# Patient Record
Sex: MALE | Race: BLACK OR AFRICAN AMERICAN | Employment: OTHER | URBAN - METROPOLITAN AREA
[De-identification: names, ages, dates, MRNs, and addresses within clinical notes are randomized per-mention and may not be internally consistent; named-entity substitution may affect disease eponyms.]

---

## 2017-01-10 DIAGNOSIS — J45.40 MODERATE PERSISTENT ASTHMA WITHOUT COMPLICATION: ICD-10-CM

## 2017-01-10 RX ORDER — FLUTICASONE PROPIONATE AND SALMETEROL 500; 50 UG/1; UG/1
1 POWDER RESPIRATORY (INHALATION) EVERY 12 HOURS
Qty: 3 EACH | Refills: 2 | Status: SHIPPED | OUTPATIENT
Start: 2017-01-10 | End: 2017-01-31 | Stop reason: SDUPTHER

## 2017-01-10 NOTE — TELEPHONE ENCOUNTER
Received faxed request from [employer] to have patient's medication filled for 90 day supply. Last seen on 11/15/16 with plan for patient to f/u with Dr. Amada Ramírez.

## 2017-01-10 NOTE — TELEPHONE ENCOUNTER
We'd discussed returning to Dr. Blade Villarreal no later than next spring, but I had wanted him to return to me in Dec to assess the change in his regimen. Please schedule that.   MARE

## 2017-01-11 NOTE — TELEPHONE ENCOUNTER
Called patient's home number no answer left him a message asking that he call the office back office number has been left.

## 2017-01-17 NOTE — TELEPHONE ENCOUNTER
Patient called the office back had him verify his  he has been told his medication has been sent to his pharmacy but Dr. Loletta Felty did want him to come back in December for a f/u on his Asthma. He has been scheduled to be seen on 2017 at 9:30 AM for asthma f/u.

## 2017-01-31 ENCOUNTER — OFFICE VISIT (OUTPATIENT)
Dept: FAMILY MEDICINE CLINIC | Age: 67
End: 2017-01-31

## 2017-01-31 VITALS
WEIGHT: 173 LBS | BODY MASS INDEX: 24.82 KG/M2 | OXYGEN SATURATION: 96 % | SYSTOLIC BLOOD PRESSURE: 128 MMHG | RESPIRATION RATE: 16 BRPM | HEART RATE: 71 BPM | DIASTOLIC BLOOD PRESSURE: 84 MMHG | TEMPERATURE: 98.1 F

## 2017-01-31 DIAGNOSIS — Z23 ENCOUNTER FOR IMMUNIZATION: ICD-10-CM

## 2017-01-31 DIAGNOSIS — J45.40 MODERATE PERSISTENT ASTHMA WITHOUT COMPLICATION: Primary | ICD-10-CM

## 2017-01-31 RX ORDER — FLUTICASONE PROPIONATE AND SALMETEROL 500; 50 UG/1; UG/1
1 POWDER RESPIRATORY (INHALATION) EVERY 12 HOURS
Qty: 3 EACH | Refills: 1 | Status: ON HOLD | OUTPATIENT
Start: 2017-01-31 | End: 2018-12-13

## 2017-01-31 RX ORDER — LEVALBUTEROL TARTRATE 45 UG/1
2 AEROSOL, METERED ORAL
Qty: 1 INHALER | Refills: 4 | Status: SHIPPED | OUTPATIENT
Start: 2017-01-31 | End: 2021-05-28 | Stop reason: SDUPTHER

## 2017-01-31 NOTE — PROGRESS NOTES
Chief Complaint   Patient presents with    Asthma     1. Have you been to the ER, urgent care clinic since your last visit? Hospitalized since your last visit? Pt was in [facility] ER in Dec. Was told he was dehydrated. 2. Have you seen or consulted any other health care providers outside of the [facility] since your last visit? Include any pap smears or colon screening.  No      PF: 450, 450, 400

## 2017-01-31 NOTE — PROGRESS NOTES
Graciela Arriaga is a 78 y.o. male  Follow-up moderate persistent asthma last visit 11/15/2016 uncontrolled. Discontinued Flovent. Initiated high-dose Advair. Continued Singulair and Xopenex. 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 Some GI side effects with Advair, tolerable. Asthma Control Test 12Yrs Older 1/31/2017 11/15/2016 7/29/2016 1/31/2014 11/1/2013   In the past 4 weeks, how much of the time did your asthma keep you from getting as much done at work, school, or at home? 5 3 3 4 4   During the past 4 weeks how often have you had shortness of breath 5 1 3 1 1   During the past 4 weeks often did your asthma symptoms wake up you at night or earlier than usual in the morning 5 3 2 2 4   During the past 4 weeks how often have you used your rescue inhaler or nebulizer medication  4 2 1 2 1   How would you rate your asthma control during the past 4 weeks 4 3 3 3 3   Score 23 12 12 12 13         Patient Care Team:  Yuly Arellano MD as PCP - General (Family Practice)  Yuly Arellano MD (Family Practice)  Peg Love MD (Inactive) (Colon and Rectal Surgery)  Jayde Bunch MD (Pulmonary Disease)  Brian Negro MD (Allergy)  Italia Ha MD (Gastroenterology)  Bniu Harris MD (Ophthalmology)  Allergies   Allergen Reactions    Latex Hives and Itching    Other Medication Other (comments)     Pt allergic to cats with respiratory, skin reactions. Pt allergic to cashews with upset stomach    Vicodin [Hydrocodone-Acetaminophen] Other (comments)     hallucinations     Outpatient Prescriptions Marked as Taking for the 1/31/17 encounter (Office Visit) with Yuly Arellano MD   Medication Sig Dispense Refill    fluticasone-salmeterol (ADVAIR) 500-50 mcg/dose diskus inhaler Take 1 Puff by inhalation every twelve (12) hours.  3 Each 2    levalbuterol tartrate (XOPENEX) 45 mcg/actuation inhaler Take 2 Puffs by inhalation every four (4) hours as needed for Wheezing. 1 Inhaler 4    montelukast (SINGULAIR) 10 mg tablet Take 1 Tab by mouth daily. 90 Tab 4    cetirizine-psuedoePHEDrine (ZYRTEC-D) 5-120 mg per tablet Take 1 Tab by mouth daily. 90 Tab 4    levalbuterol (XOPENEX) 1.25 mg/3 mL nebu 3 mL by Nebulization route every six (6) hours as needed. 1 Package 2    metoclopramide HCl (REGLAN) 10 mg tablet Take 10 mg by mouth Before breakfast, lunch, dinner and at bedtime.  aspirin 81 mg chewable tablet Take 1 Tab by mouth daily. 90 Tab 4    amLODIPine (NORVASC) 5 mg tablet Take 1 Tab by mouth daily. 60 Tab 1    albuterol (PROVENTIL VENTOLIN) 2.5 mg /3 mL (0.083 %) nebulizer solution 3 mL by Nebulization route every four (4) hours as needed for Wheezing. 1 Package 1    FA/MV-MN/MIN AA JANETT/SAW PAL (SAW PALMETO-MULTIVIT-MIN-AA-FA PO) Take 1 Tab by mouth daily.  vitamin e (E GEMS) 1,000 unit capsule Take 1,000 Units by mouth daily.  Cholecalciferol, Vitamin D3, (VITAMIN D) 2,000 unit Cap Take 1 Cap by mouth daily. 30 Cap 11     Patient Active Problem List    Diagnosis    Hyperlipidemia     3/6/2016: ASCVD 10 year risk 14.9 %. Lifetime risk NA . Statin initiated.  Diastolic dysfunction     5/23/0451: inpatient echo (chest pain) EF 75-88% diastolic dysfunction I. Dr. Virginia Louise. CCB initiated 3/6/2016.  Essential hypertension     3/6/2016: ASCVD 10 year risk 14.9 %. Lifetime risk NA. Aspirin initiated. Switched from thiazide to CCB for context of diastolic dysfunction.       Elevated blood pressure    Elevated PSA    Hypertrophy of prostate with urinary obstruction and other lower urinary tract symptoms (LUTS)    Family history of prostate cancer    Lumbosacral radiculopathy due to degenerative joint disease of spine    Cervical radiculopathy due to degenerative joint disease of spine    Allergic rhinitis    Moderate persistent asthma without complication     5/9/1503: PFT consistent with Obstructive airway disease mild in nature. Overall looks like COPD and emphysema but as non smoker  possibility of Asthma with airway remodeling is there.  Neutropenia (HCC)    Cough    Vitamin D deficiency     Past Medical History   Diagnosis Date    Asthma     Back problem     Burning with urination     Cough     Depression     GERD (gastroesophageal reflux disease)     Poor appetite     Trouble in sleeping     Wheezing      Past Surgical History   Procedure Laterality Date    Hx orchiectomy  1999     left, varicocele with complications     Family History   Problem Relation Age of Onset    Hypertension Mother     Heart Disease Mother     Arthritis-rheumatoid Mother     Hypertension Father     Heart Attack Father 48    Cancer Brother 72     prostate    Cancer Brother 61     prostate    Colon Cancer Brother     Heart Attack Brother 48    Cancer Brother 66     prostate    Diabetes Brother     Heart Attack Sister 61    Colon Polyps Sister     Heart Attack Brother 54    Heart Attack Sister 72     Social History     Social History    Marital status:      Spouse name: N/A    Number of children: N/A    Years of education: N/A     Occupational History    Not on file. Social History Main Topics    Smoking status: Never Smoker    Smokeless tobacco: Never Used    Alcohol use 3.0 oz/week     6 Cans of beer per week      Comment: occ    Drug use: No    Sexual activity: Yes     Partners: Female     Other Topics Concern    Not on file     Social History Narrative         ROS         Visit Vitals    /84 (BP 1 Location: Left arm, BP Patient Position: Sitting)    Pulse 71    Temp 98.1 °F (36.7 °C) (Temporal)    Resp 16    Wt 173 lb (78.5 kg)    SpO2 96%     L/min    BMI 24.82 kg/m2   expected 530  Physical Exam   Constitutional: He is oriented to person, place, and time. He appears well-developed. No distress. HENT:   Head: Normocephalic and atraumatic.    Pulmonary/Chest: Effort normal. Neurological: He is alert and oriented to person, place, and time. Psychiatric: He has a normal mood and affect. His behavior is normal. Judgment and thought content normal.         ICD-10-CM ICD-9-CM    1. Moderate persistent asthma without complication C03.04 156.98 fluticasone-salmeterol (ADVAIR) 500-50 mcg/dose diskus inhaler      levalbuterol tartrate (XOPENEX) 45 mcg/actuation inhaler   2. Encounter for immunization Z23 V03.89 varicella zoster vacine live (ZOSTAVAX) 19,400 unit/0.65 mL susr injection     Moderate persistent asthma: Improved. Clinically well-controlled but peak flows are less than I would expect for age and height. He has not yet been contacted to schedule his pulmonary subspecialty appointment. I will ask my support staff to facilitate. The patient understands our medical plan. All questions have been answered. He is instructed to call the clinic if he has not been notified either by phone or through [address] with the results of his tests or with an appointment plan for any referrals within 1 week(s). No news is not good news; it's no news. The patient  is to call if his condition worsens or fails to improve or if significant side effects are experienced. Follow-up Disposition:  Return in about 3 months (around 4/30/2017) for asthma with pulmonologist and then plan to follow . Dragon medical dictation software was used for portions of this report. Unintended voice recognition errors may occur.

## 2017-01-31 NOTE — MR AVS SNAPSHOT
Visit Information Date & Time Provider Department Dept. Phone Encounter #  
 1/31/2017  2:15 PM Estela Herrera MD [facility] [phone] [id number] Follow-up Instructions Return in about 3 months (around 4/30/2017) for asthma with pulmonologist and then plan to follow . Upcoming Health Maintenance Date Due ZOSTER VACCINE AGE 60> 8/2/2009 GLAUCOMA SCREENING Q2Y 12/1/2015 MEDICARE YEARLY EXAM 2/9/2017 Pneumococcal 65+ Low/Medium Risk (2 of 2 - PPSV23) 5/8/2018 COLONOSCOPY 6/18/2020 DTaP/Tdap/Td series (2 - Td) 9/9/2020 Allergies as of 1/31/2017  Review Complete On: 1/31/2017 By: Estela Hererra MD  
  
 Severity Noted Reaction Type Reactions Latex  04/08/2015    Hives, Itching Other Medication  01/23/2013    Other (comments) Pt allergic to cats with respiratory, skin reactions. Pt allergic to cashews with upset stomach Vicodin [Hydrocodone-acetaminophen]  05/15/2014    Other (comments)  
 hallucinations Current Immunizations  Never Reviewed Name Date Influenza Vaccine 11/1/2013 Pneumococcal Conjugate (PCV-13) 2/9/2016 Pneumococcal Polysaccharide (PPSV-23) 5/8/2013 TDAP Vaccine 9/9/2010 Not reviewed this visit You Were Diagnosed With   
  
 Codes Comments Moderate persistent asthma without complication    -  Primary ICD-10-CM: J45.40 ICD-9-CM: 493.90 Encounter for immunization     ICD-10-CM: O70 ICD-9-CM: V03.89 Vitals BP Pulse Temp Resp Weight(growth percentile) SpO2  
 128/84 (BP 1 Location: Left arm, BP Patient Position: Sitting) 71 98.1 °F (36.7 °C) (Temporal) 16 173 lb (78.5 kg) 96% PF BMI Smoking Status 450 L/min 24.82 kg/m2 Never Smoker Vitals History BMI and BSA Data Body Mass Index Body Surface Area  
 24.82 kg/m 2 1.97 m 2 [facility] [address] [phone] Your Updated Medication List  
  
   
This list is accurate as of: 1/31/17  2:58 PM.  Always use your most recent med list.  
  
  
  
  
 albuterol 2.5 mg /3 mL (0.083 %) nebulizer solution Commonly known as:  PROVENTIL VENTOLIN  
3 mL by Nebulization route every four (4) hours as needed for Wheezing. amLODIPine 5 mg tablet Commonly known as:  Pema Spruce Take 1 Tab by mouth daily. aspirin 81 mg chewable tablet Take 1 Tab by mouth daily. atorvastatin 20 mg tablet Commonly known as:  LIPITOR Take 1 Tab by mouth daily. cetirizine-psuedoePHEDrine 5-120 mg per tablet Commonly known as:  ZyrTEC-D Take 1 Tab by mouth daily. Cholecalciferol (Vitamin D3) 2,000 unit Cap capsule Commonly known as:  VITAMIN D Take 1 Cap by mouth daily. fluticasone-salmeterol 500-50 mcg/dose diskus inhaler Commonly known as:  ADVAIR Take 1 Puff by inhalation every twelve (12) hours. levalbuterol 1.25 mg/3 mL Nebu Commonly known as:  XOPENEX  
3 mL by Nebulization route every six (6) hours as needed. levalbuterol tartrate 45 mcg/actuation inhaler Commonly known as:  Carolyn Fernando Take 2 Puffs by inhalation every four (4) hours as needed for Wheezing. metoclopramide HCl 10 mg tablet Commonly known as:  REGLAN Take 10 mg by mouth Before breakfast, lunch, dinner and at bedtime. montelukast 10 mg tablet Commonly known as:  SINGULAIR Take 1 Tab by mouth daily. SAW PALMETO-MULTIVIT-MIN-AA-FA PO Take 1 Tab by mouth daily. varicella zoster vacine live 19,400 unit/0.65 mL Susr injection Commonly known as:  ZOSTAVAX  
1 Vial by SubCUTAneous route once for 1 dose. vitamin e 1,000 unit capsule Commonly known as:  E GEMS Take 1,000 Units by mouth daily. Prescriptions Printed Refills varicella zoster vacine live (ZOSTAVAX) 19,400 unit/0.65 mL susr injection 0 Si Vial by SubCUTAneous route once for 1 dose. Class: Print Route: SubCUTAneous Prescriptions Sent to Pharmacy Refills  
 fluticasone-salmeterol (ADVAIR) 500-50 mcg/dose diskus inhaler 1 Sig: Take 1 Puff by inhalation every twelve (12) hours. Class: Normal  
 Pharmacy: [address], [address] [place] [address] #: [phone] Route: Inhalation  
 levalbuterol tartrate (XOPENEX) 45 mcg/actuation inhaler 4 Sig: Take 2 Puffs by inhalation every four (4) hours as needed for Wheezing. Class: Normal  
 Pharmacy: [address], [address] Ph #: [phone] Route: Inhalation Follow-up Instructions Return in about 3 months (around 2017) for asthma with pulmonologist and then plan to follow . Introducing [facility]! Dear Cristian Mckinley: Thank you for requesting a [employer] account. Our records indicate that you already have an active [employer] account. You can access your account anytime at [email or web address] Did you know that you can access your hospital and ER discharge instructions at any time in [employer]? You can also review all of your test results from your hospital stay or ER visit. Additional Information If you have questions, please visit the Frequently Asked Questions section of the [employer] website at [email or web address] Remember, [employer] is NOT to be used for urgent needs. For medical emergencies, dial 911. Now available from your iPhone and Android! Please provide this summary of care documentation to your next provider. Your primary care clinician is listed as Amarjit Joseph. If you have any questions after today's visit, please call [phone].

## 2017-04-04 ENCOUNTER — HOSPITAL ENCOUNTER (OUTPATIENT)
Dept: LAB | Age: 67
Discharge: HOME | End: 2017-04-04
Payer: MEDICARE

## 2017-04-04 ENCOUNTER — OFFICE VISIT (OUTPATIENT)
Dept: FAMILY MEDICINE CLINIC | Age: 67
End: 2017-04-04

## 2017-04-04 VITALS
OXYGEN SATURATION: 96 % | TEMPERATURE: 97.9 F | BODY MASS INDEX: 24.82 KG/M2 | HEART RATE: 83 BPM | WEIGHT: 173 LBS | DIASTOLIC BLOOD PRESSURE: 80 MMHG | SYSTOLIC BLOOD PRESSURE: 124 MMHG | RESPIRATION RATE: 16 BRPM

## 2017-04-04 DIAGNOSIS — Z00.00 ROUTINE GENERAL MEDICAL EXAMINATION AT A HEALTH CARE FACILITY: Primary | ICD-10-CM

## 2017-04-04 DIAGNOSIS — Z12.5 SPECIAL SCREENING FOR MALIGNANT NEOPLASM OF PROSTATE: ICD-10-CM

## 2017-04-04 DIAGNOSIS — Z13.31 SCREENING FOR DEPRESSION: ICD-10-CM

## 2017-04-04 DIAGNOSIS — J45.40 MODERATE PERSISTENT ASTHMA WITHOUT COMPLICATION: ICD-10-CM

## 2017-04-04 DIAGNOSIS — Z80.42 FAMILY HISTORY OF PROSTATE CANCER: ICD-10-CM

## 2017-04-04 DIAGNOSIS — Z13.39 SCREENING FOR ALCOHOLISM: ICD-10-CM

## 2017-04-04 LAB — PSA SERPL-MCNC: 1.9 NG/ML (ref 0–4)

## 2017-04-04 PROCEDURE — 84153 ASSAY OF PSA TOTAL: CPT | Performed by: FAMILY MEDICINE

## 2017-04-04 NOTE — MR AVS SNAPSHOT
Visit Information Date & Time Provider Department Dept. Phone Encounter #  
 4/4/2017 10:00 AM Saul Denton MD [facility] [phone] [id number] Upcoming Health Maintenance Date Due ZOSTER VACCINE AGE 60> 8/2/2009 GLAUCOMA SCREENING Q2Y 12/1/2015 MEDICARE YEARLY EXAM 2/9/2017 Pneumococcal 65+ Low/Medium Risk (2 of 2 - PPSV23) 5/8/2018 COLONOSCOPY 6/18/2020 DTaP/Tdap/Td series (2 - Td) 9/9/2020 Allergies as of 4/4/2017  Review Complete On: 4/4/2017 By: Saul Denton MD  
  
 Severity Noted Reaction Type Reactions Latex  04/08/2015    Hives, Itching Other Medication  01/23/2013    Other (comments) Pt allergic to cats with respiratory, skin reactions. Pt allergic to cashews with upset stomach Vicodin [Hydrocodone-acetaminophen]  05/15/2014    Other (comments)  
 hallucinations Current Immunizations  Never Reviewed Name Date Influenza Vaccine 11/1/2013 Pneumococcal Conjugate (PCV-13) 2/9/2016 Pneumococcal Polysaccharide (PPSV-23) 5/8/2013 TDAP Vaccine 9/9/2010 Not reviewed this visit You Were Diagnosed With   
  
 Codes Comments Special screening for malignant neoplasm of prostate    -  Primary ICD-10-CM: Z12.5 ICD-9-CM: V76.44 Routine general medical examination at a health care facility     ICD-10-CM: Z00.00 ICD-9-CM: V70.0 Screening for alcoholism     ICD-10-CM: Z13.89 ICD-9-CM: V79.1 Vitals BP Pulse Temp Resp Weight(growth percentile) SpO2  
 124/80 83 97.9 °F (36.6 °C) (Temporal) 16 173 lb (78.5 kg) 96% BMI Smoking Status 24.82 kg/m2 Never Smoker BMI and BSA Data Body Mass Index Body Surface Area  
 24.82 kg/m 2 1.97 m 2 Preferred Pharmacy Pharmacy Name Phone [address] [facility] [phone] Your Updated Medication List  
  
   
 This list is accurate as of: 4/4/17 10:34 AM.  Always use your most recent med list.  
  
  
  
  
 albuterol 2.5 mg /3 mL (0.083 %) nebulizer solution Commonly known as:  PROVENTIL VENTOLIN  
3 mL by Nebulization route every four (4) hours as needed for Wheezing. amLODIPine 5 mg tablet Commonly known as:  Brittani Fast Take 1 Tab by mouth daily. aspirin 81 mg chewable tablet Take 1 Tab by mouth daily. atorvastatin 20 mg tablet Commonly known as:  LIPITOR Take 1 Tab by mouth daily. cetirizine-psuedoePHEDrine 5-120 mg per tablet Commonly known as:  ZyrTEC-D Take 1 Tab by mouth daily. Cholecalciferol (Vitamin D3) 2,000 unit Cap capsule Commonly known as:  VITAMIN D Take 1 Cap by mouth daily. fluticasone-salmeterol 500-50 mcg/dose diskus inhaler Commonly known as:  ADVAIR Take 1 Puff by inhalation every twelve (12) hours. levalbuterol 1.25 mg/3 mL Nebu Commonly known as:  XOPENEX  
3 mL by Nebulization route every six (6) hours as needed. levalbuterol tartrate 45 mcg/actuation inhaler Commonly known as:  Angela Rausch Take 2 Puffs by inhalation every four (4) hours as needed for Wheezing. metoclopramide HCl 10 mg tablet Commonly known as:  REGLAN Take 10 mg by mouth Before breakfast, lunch, dinner and at bedtime. montelukast 10 mg tablet Commonly known as:  SINGULAIR Take 1 Tab by mouth daily. SAW PALMETO-MULTIVIT-MIN-AA-FA PO Take 1 Tab by mouth daily. vitamin e 1,000 unit capsule Commonly known as:  E GEMS Take 1,000 Units by mouth daily. To-Do List   
 04/04/2017 Lab:  PSA SCREENING (SCREENING) Patient Instructions Medicare Wellness Visit, Male The best way to live healthy is to have a healthy lifestyle by eating a well-balanced diet, exercising regularly, limiting alcohol and stopping smoking. Regular physical exams and screening tests are another way to keep healthy. Preventive exams provided by your health care provider can find health problems before they become diseases or illnesses. Preventive services including immunizations, screening tests, monitoring and exams can help you take care of your own health. All people over age 72 should have a pneumovax  and and a prevnar shot to prevent pneumonia. These are once in a lifetime unless you and your provider decide differently. All people over 65 should have a yearly flu shot and a tetanus vaccine every 10 years. Immunization History Administered Date(s) Administered  Influenza Vaccine 11/01/2013  Pneumococcal Conjugate (PCV-13) 02/09/2016  Pneumococcal Polysaccharide (PPSV-23) 05/08/2013  TDAP Vaccine 09/09/2010 You will be due for another dose of PPSV-23 on or after 5/8/2018. Screening for diabetes mellitus with a blood sugar test should be done every 3 years though age 79, then at our discretion. Lab Results Component Value Date/Time Glucose 95 02/29/2016 04:43 AM  
 
 
Glaucoma is a disease of the eye due to increased ocular pressure that can lead to blindness and it should be done every year by an eye professional. Please schedule if not already done. Cardiovascular screening tests that check for elevated lipids (fatty part of blood) which can lead to heart disease and strokes should be done every 5 years. Lab Results Component Value Date/Time Cholesterol, total 240 02/29/2016 04:43 AM  
 HDL Cholesterol 81 02/29/2016 04:43 AM  
 LDL, calculated 141.4 02/29/2016 04:43 AM  
 VLDL, calculated 17.6 02/29/2016 04:43 AM  
 Triglyceride 88 02/29/2016 04:43 AM  
 CHOL/HDL Ratio 3.0 02/29/2016 04:43 AM  
 
Colorectal screening that evaluates for blood or polyps in your colon should be done yearly as a stool test or every five years as a flexible sigmoidoscope or every 10 years as a colonoscopy up to age 76.  
 
Men up to age 76 may need a screening blood test for prostate cancer at certain intervals, depending on their personal and family history. This decision is between the patient and his provider. Lab Results Component Value Date/Time  
 Prostate Specific Ag 2.32 02/04/2015 02:25 PM  
 Prostate Specific Ag 3.1 01/08/2015 02:08 PM  
 Prostate Specific Ag 1.8 10/18/2012 01:30 PM  
 Prostate Specific Ag 3.5 04/21/2010 08:36 PM  
 Prostate Specific Ag 0.9 10/26/2009 09:30 AM  
 
 
 
If you have been a smoker or had family history of abdominal aortic aneurysms, you and your provider may decide to schedule an ultrasound test of your aorta. Hepatitis C screening is also recommended for anyone born between [birth year] through [address]. A shingles vaccine is also recommended once in a lifetime after age 61. Your Medicare Wellness Exam is recommended annually. Here is a list of your current Health Maintenance items with a due date: 
Health Maintenance Due Topic Date Due  Shingles Vaccine  08/02/2009  Glaucoma Screening   12/01/2015 [address] [place] [address] Annual Well Visit  02/09/2017 Pt had Zoster at [facility]. Health Maintenance Topic Date Due  
 ZOSTER VACCINE AGE 60>  08/02/2009  GLAUCOMA SCREENING Q2Y  12/01/2015  MEDICARE YEARLY EXAM  02/09/2017  Pneumococcal 65+ Low/Medium Risk (2 of 2 - PPSV23) 05/08/2018  COLONOSCOPY  06/18/2020  
 DTaP/Tdap/Td series (2 - Td) 09/09/2020  Hepatitis C Screening  Completed  INFLUENZA AGE 9 TO ADULT  Addressed Well Visit, Over 72: Care Instructions Your Care Instructions Physical exams can help you stay healthy. Your doctor has checked your overall health and may have suggested ways to take good care of yourself. He or she also may have recommended tests. At home, you can help prevent illness with healthy eating, regular exercise, and other steps. Follow-up care is a key part of your treatment and safety.  Be sure to make and go to all appointments, and call your doctor if you are having problems. It's also a good idea to know your test results and keep a list of the medicines you take. How can you care for yourself at home? · Reach and stay at a healthy weight. This will lower your risk for many problems, such as obesity, diabetes, heart disease, and high blood pressure. · Get at least 30 minutes of exercise on most days of the week. Walking is a good choice. You also may want to do other activities, such as running, swimming, cycling, or playing tennis or team sports. · Do not smoke. Smoking can make health problems worse. If you need help quitting, talk to your doctor about stop-smoking programs and medicines. These can increase your chances of quitting for good. · Protect your skin from too much sun. When you're outdoors from 10 a.m. to 4 p.m., stay in the shade or cover up with clothing and a hat with a wide brim. Wear sunglasses that block UV rays. Even when it's cloudy, put broad-spectrum sunscreen (SPF 30 or higher) on any exposed skin. · See a dentist one or two times a year for checkups and to have your teeth cleaned. · Wear a seat belt in the car. · Limit alcohol to 2 drinks a day for men and 1 drink a day for women. Too much alcohol can cause health problems. Follow your doctor's advice about when to have certain tests. These tests can spot problems early. For men and women · Cholesterol. Your doctor will tell you how often to have this done based on your overall health and other things that can increase your risk for heart attack and stroke. · Blood pressure. Have your blood pressure checked during a routine doctor visit. Your doctor will tell you how often to check your blood pressure based on your age, your blood pressure results, and other factors. · Diabetes. Ask your doctor whether you should have tests for diabetes. · Vision. Experts recommend that you have yearly exams for glaucoma and other age-related eye problems. · Hearing. Tell your doctor if you notice any change in your hearing. You can have tests to find out how well you hear. · Colon cancer tests. Keep having colon cancer tests as your doctor recommends. You can have one of several types of tests. · Heart attack and stroke risk. At least every 4 to 6 years, you should have your risk for heart attack and stroke assessed. Your doctor uses factors such as your age, blood pressure, cholesterol, and whether you smoke or have diabetes to show what your risk for a heart attack or stroke is over the next 10 years. · Osteoporosis. Talk to your doctor about whether you should have a bone density test to find out whether you have thinning bones. Also ask your doctor about whether you should take calcium and vitamin D supplements. For women · Pap test and pelvic exam. You may no longer need a Pap test. Talk with your doctor about whether to stop or continue to have Pap tests. · Breast exam and mammogram. Ask how often you should have a mammogram, which is an X-ray of your breasts. A mammogram can spot breast cancer before it can be felt and when it is easiest to treat. · Thyroid disease. Talk to your doctor about whether to have your thyroid checked as part of a regular physical exam. Women have an increased chance of a thyroid problem. For men · Prostate exam. Talk to your doctor about whether you should have a blood test (called a PSA test) for prostate cancer. Experts disagree on whether men should have this test. Some experts recommend that you discuss the benefits and risks of the test with your doctor. · Abdominal aortic aneurysm. Ask your doctor whether you should have a test to check for an aneurysm. You may need a test if you ever smoked or if your parent, brother, sister, or child has had an aneurysm. When should you call for help?  
Watch closely for changes in your health, and be sure to contact your doctor if you have any problems or symptoms that concern you. Where can you learn more? Go to http://fredis-haja.info/. Enter P164 in the search box to learn more about \"Well Visit, Over 65: Care Instructions. \" Current as of: July 19, 2016 Content Version: 11.2 © 3834-1480 [employer]. Care instructions adapted under license by [employer] (which disclaims liability or warranty for this information). If you have questions about a medical condition or this instruction, always ask your healthcare professional. [address] any warranty or liability for your use of this information. Introducing [facility]! Dear Yee Burns: Thank you for requesting a MyDoc account. Our records indicate that you already have an active MyDoc account. You can access your account anytime at [email or web address] Did you know that you can access your hospital and ER discharge instructions at any time in MyDoc? You can also review all of your test results from your hospital stay or ER visit. Additional Information If you have questions, please visit the Frequently Asked Questions section of the MyDoc website at [email or web address] Remember, MyDoc is NOT to be used for urgent needs. For medical emergencies, dial 911. Now available from your iPhone and Android! Please provide this summary of care documentation to your next provider. Your primary care clinician is listed as [place]. If you have any questions after today's visit, please call [phone].

## 2017-04-04 NOTE — PATIENT INSTRUCTIONS
Encouraged to provide copy of ACP documents. Medicare Wellness Visit, Male    The best way to live healthy is to have a healthy lifestyle by eating a well-balanced diet, exercising regularly, limiting alcohol and stopping smoking. Regular physical exams and screening tests are another way to keep healthy. Preventive exams provided by your health care provider can find health problems before they become diseases or illnesses. Preventive services including immunizations, screening tests, monitoring and exams can help you take care of your own health. All people over age 72 should have a pneumovax  and and a prevnar shot to prevent pneumonia. These are once in a lifetime unless you and your provider decide differently. All people over 65 should have a yearly flu shot and a tetanus vaccine every 10 years. Immunization History   Administered Date(s) Administered    Influenza Vaccine 11/01/2013    Pneumococcal Conjugate (PCV-13) 02/09/2016    Pneumococcal Polysaccharide (PPSV-23) 05/08/2013    TDAP Vaccine 09/09/2010     You will be due for another dose of PPSV-23 on or after 5/8/2018. Screening for diabetes mellitus with a blood sugar test should be done every 3 years though age 79, then at our discretion. Lab Results   Component Value Date/Time    Glucose 95 02/29/2016 04:43 AM       Glaucoma is a disease of the eye due to increased ocular pressure that can lead to blindness and it should be done every year by an eye professional. Please schedule if not already done. Cardiovascular screening tests that check for elevated lipids (fatty part of blood) which can lead to heart disease and strokes should be done every 5 years.   Lab Results   Component Value Date/Time    Cholesterol, total 240 02/29/2016 04:43 AM    HDL Cholesterol 81 02/29/2016 04:43 AM    LDL, calculated 141.4 02/29/2016 04:43 AM    VLDL, calculated 17.6 02/29/2016 04:43 AM    Triglyceride 88 02/29/2016 04:43 AM    CHOL/HDL Ratio 3.0 02/29/2016 04:43 AM     Colorectal screening that evaluates for blood or polyps in your colon should be done yearly as a stool test or every five years as a flexible sigmoidoscope or every 10 years as a colonoscopy up to age 76. Men up to age 76 may need a screening blood test for prostate cancer at certain intervals, depending on their personal and family history. This decision is between the patient and his provider. Lab Results   Component Value Date/Time    Prostate Specific Ag 2.32 02/04/2015 02:25 PM    Prostate Specific Ag 3.1 01/08/2015 02:08 PM    Prostate Specific Ag 1.8 10/18/2012 01:30 PM    Prostate Specific Ag 3.5 04/21/2010 08:36 PM    Prostate Specific Ag 0.9 10/26/2009 09:30 AM         If you have been a smoker or had family history of abdominal aortic aneurysms, you and your provider may decide to schedule an ultrasound test of your aorta. Hepatitis C screening is also recommended for anyone born between [birth year] through [address]. A shingles vaccine is also recommended once in a lifetime after age 61. Your Medicare Wellness Exam is recommended annually. Here is a list of your current Health Maintenance items with a due date:  Health Maintenance Due   Topic Date Due    Shingles Vaccine  08/02/2009    Glaucoma Screening   12/01/2015    Annual Well Visit  02/09/2017     Pt had Zoster at [facility]. Health Maintenance   Topic Date Due    ZOSTER VACCINE AGE 60>  08/02/2009    GLAUCOMA SCREENING Q2Y  12/01/2015    MEDICARE YEARLY EXAM  02/09/2017    Pneumococcal 65+ Low/Medium Risk (2 of 2 - PPSV23) 05/08/2018    COLONOSCOPY  06/18/2020    DTaP/Tdap/Td series (2 - Td) 09/09/2020    Hepatitis C Screening  Completed    INFLUENZA AGE 9 TO ADULT  Addressed            Well Visit, Over 72: Care Instructions  Your Care Instructions  Physical exams can help you stay healthy. Your doctor has checked your overall health and may have suggested ways to take good care of yourself.  He or she also may have recommended tests. At home, you can help prevent illness with healthy eating, regular exercise, and other steps. Follow-up care is a key part of your treatment and safety. Be sure to make and go to all appointments, and call your doctor if you are having problems. It's also a good idea to know your test results and keep a list of the medicines you take. How can you care for yourself at home? · Reach and stay at a healthy weight. This will lower your risk for many problems, such as obesity, diabetes, heart disease, and high blood pressure. · Get at least 30 minutes of exercise on most days of the week. Walking is a good choice. You also may want to do other activities, such as running, swimming, cycling, or playing tennis or team sports. · Do not smoke. Smoking can make health problems worse. If you need help quitting, talk to your doctor about stop-smoking programs and medicines. These can increase your chances of quitting for good. · Protect your skin from too much sun. When you're outdoors from 10 a.m. to 4 p.m., stay in the shade or cover up with clothing and a hat with a wide brim. Wear sunglasses that block UV rays. Even when it's cloudy, put broad-spectrum sunscreen (SPF 30 or higher) on any exposed skin. · See a dentist one or two times a year for checkups and to have your teeth cleaned. · Wear a seat belt in the car. · Limit alcohol to 2 drinks a day for men and 1 drink a day for women. Too much alcohol can cause health problems. Follow your doctor's advice about when to have certain tests. These tests can spot problems early. For men and women  · Cholesterol. Your doctor will tell you how often to have this done based on your overall health and other things that can increase your risk for heart attack and stroke. · Blood pressure. Have your blood pressure checked during a routine doctor visit.  Your doctor will tell you how often to check your blood pressure based on your age, your blood pressure results, and other factors. · Diabetes. Ask your doctor whether you should have tests for diabetes. · Vision. Experts recommend that you have yearly exams for glaucoma and other age-related eye problems. · Hearing. Tell your doctor if you notice any change in your hearing. You can have tests to find out how well you hear. · Colon cancer tests. Keep having colon cancer tests as your doctor recommends. You can have one of several types of tests. · Heart attack and stroke risk. At least every 4 to 6 years, you should have your risk for heart attack and stroke assessed. Your doctor uses factors such as your age, blood pressure, cholesterol, and whether you smoke or have diabetes to show what your risk for a heart attack or stroke is over the next 10 years. · Osteoporosis. Talk to your doctor about whether you should have a bone density test to find out whether you have thinning bones. Also ask your doctor about whether you should take calcium and vitamin D supplements. For women  · Pap test and pelvic exam. You may no longer need a Pap test. Talk with your doctor about whether to stop or continue to have Pap tests. · Breast exam and mammogram. Ask how often you should have a mammogram, which is an X-ray of your breasts. A mammogram can spot breast cancer before it can be felt and when it is easiest to treat. · Thyroid disease. Talk to your doctor about whether to have your thyroid checked as part of a regular physical exam. Women have an increased chance of a thyroid problem. For men  · Prostate exam. Talk to your doctor about whether you should have a blood test (called a PSA test) for prostate cancer. Experts disagree on whether men should have this test. Some experts recommend that you discuss the benefits and risks of the test with your doctor. · Abdominal aortic aneurysm. Ask your doctor whether you should have a test to check for an aneurysm.  You may need a test if you ever smoked or if your parent, brother, sister, or child has had an aneurysm. When should you call for help? Watch closely for changes in your health, and be sure to contact your doctor if you have any problems or symptoms that concern you. Where can you learn more? Go to http://fredis-haja.info/. Enter C581 in the search box to learn more about \"Well Visit, Over 65: Care Instructions. \"  Current as of: July 19, 2016  Content Version: 11.2  © 5997-7672 [employer]. Care instructions adapted under license by [employer] (which disclaims liability or warranty for this information). If you have questions about a medical condition or this instruction, always ask your healthcare professional. [address] any warranty or liability for your use of this information.

## 2017-04-04 NOTE — PROGRESS NOTES
Chief Complaint   Patient presents with    Annual Wellness Visit    Asthma     patient says it's flared today d/t pollen. Wants to proceed with [address] [place] SOB. This is a Subsequent Medicare Annual Wellness Visit providing Personalized Prevention Plan Services (PPPS) (Performed 12 months after initial AWV and PPPS )    I have reviewed the patient's medical history in detail and updated the computerized patient record. History     Past Medical History:   Diagnosis Date    Asthma     Back problem     Burning with urination     Cough     Depression     GERD (gastroesophageal reflux disease)     Poor appetite     Trouble in sleeping     Wheezing       Past Surgical History:   Procedure Laterality Date    HX ORCHIECTOMY  1999    left, varicocele with complications     Current Outpatient Prescriptions   Medication Sig Dispense Refill    fluticasone-salmeterol (ADVAIR) 500-50 mcg/dose diskus inhaler Take 1 Puff by inhalation every twelve (12) hours. 3 Each 1    levalbuterol tartrate (XOPENEX) 45 mcg/actuation inhaler Take 2 Puffs by inhalation every four (4) hours as needed for Wheezing. 1 Inhaler 4    montelukast (SINGULAIR) 10 mg tablet Take 1 Tab by mouth daily. 90 Tab 4    cetirizine-psuedoePHEDrine (ZYRTEC-D) 5-120 mg per tablet Take 1 Tab by mouth daily. 90 Tab 4    levalbuterol (XOPENEX) 1.25 mg/3 mL nebu 3 mL by Nebulization route every six (6) hours as needed. 1 Package 2    metoclopramide HCl (REGLAN) 10 mg tablet Take 10 mg by mouth Before breakfast, lunch, dinner and at bedtime.  aspirin 81 mg chewable tablet Take 1 Tab by mouth daily. 90 Tab 4    amLODIPine (NORVASC) 5 mg tablet Take 1 Tab by mouth daily. 60 Tab 1    albuterol (PROVENTIL VENTOLIN) 2.5 mg /3 mL (0.083 %) nebulizer solution 3 mL by Nebulization route every four (4) hours as needed for Wheezing. 1 Package 1    FA/MV-MN/MIN AA JANETT/SAW PAL (SAW PALMETO-MULTIVIT-MIN-AA-FA PO) Take 1 Tab by mouth daily.       vitamin e (E GEMS) 1,000 unit capsule Take 1,000 Units by mouth daily.  Cholecalciferol, Vitamin D3, (VITAMIN D) 2,000 unit Cap Take 1 Cap by mouth daily. 30 Cap 11    atorvastatin (LIPITOR) 20 mg tablet Take 1 Tab by mouth daily. 90 Tab 4     Allergies   Allergen Reactions    Latex Hives and Itching    Other Medication Other (comments)     Pt allergic to cats with respiratory, skin reactions.   Pt allergic to cashews with upset stomach    Vicodin [Hydrocodone-Acetaminophen] Other (comments)     hallucinations     Family History   Problem Relation Age of Onset    Hypertension Mother     Heart Disease Mother    [address] [facility] Arthritis-rheumatoid Mother     Hypertension Father     Heart Attack Father 48    Cancer Brother 72     prostate    Cancer Brother 61     prostate    Colon Cancer Brother     Heart Attack Brother 48    Cancer Brother 66     prostate    Diabetes Brother     Heart Attack Sister 61    Colon Polyps Sister     Heart Attack Brother 54    Heart Attack Sister 72     Social History   Substance Use Topics    Smoking status: Never Smoker    Smokeless tobacco: Never Used    Alcohol use 3.0 oz/week     6 Cans of beer per week      Comment: occ     Patient Active Problem List   Diagnosis Code    Vitamin D deficiency E55.9    Neutropenia (HCC) D70.9    Cough R05    Allergic rhinitis J30.9    Moderate persistent asthma without complication N36.39    Lumbosacral radiculopathy due to degenerative joint disease of spine M47.27    Cervical radiculopathy due to degenerative joint disease of spine M47.22    Elevated PSA R97.20    Hypertrophy of prostate with urinary obstruction and other lower urinary tract symptoms (LUTS) N40.1    Family history of prostate cancer Z80.42    Elevated blood pressure [id number]    Hyperlipidemia P74.1    Diastolic dysfunction D02.7    Essential hypertension I10       Depression Risk Factor Screening:     PHQ 2 / 9, over the last two weeks 4/4/2017   Michelle interest or pleasure in doing things Several days   Feeling down, depressed or hopeless Several days   Total Score PHQ 2 2   Trouble falling or staying asleep, or sleeping too much -   Feeling tired or having little energy -   Poor appetite or overeating -   Feeling bad about yourself - or that you are a failure or have let yourself or your family down -   Trouble concentrating on things such as school, work, reading or watching TV -   Moving or speaking so slowly that other people could have noticed; or the opposite being so fidgety that others notice -   Thoughts of being better off dead, or hurting yourself in some way -   PHQ 9 Score -   How difficult have these problems made it for you to do your work, take care of your home and get along with others -     Clarifies that has experienced the above symptoms several days over many years (vs none at all in the past 2 weeks), but is not currently concerned about his mood or stress level. Alcohol Risk Factor Screening: On any occasion during the past 3 months, have you had more than 4 drinks containing alcohol? No    Do you average more than 14 drinks per week? No      Functional Ability and Level of Safety:     Hearing Loss   none    Activities of Daily Living   Self-care. Requires assistance with: no ADLs    Fall Risk     Fall Risk Assessment, last 12 mths 4/4/2017   Able to walk? Yes   Fall in past 12 months? No     Abuse Screen   Patient is not abused    Review of Systems   No unexplained weight loss, fever, pelvic/abd pain, black or bloody stools      Physical Examination     Evaluation of Cognitive Function:  Mood/affect:  happy  Appearance: age appropriate  Family member/caregiver input: none    Visit Vitals    /80    Pulse 83    Temp 97.9 °F (36.6 °C) (Temporal)    Resp 16    Wt 173 lb (78.5 kg)    SpO2 96%    BMI 24.82 kg/m2      Physical Exam   Constitutional: He is oriented to person, place, and time.  He appears well-developed and well-nourished. No distress. Pleasant black male   HENT:   Head: Normocephalic and atraumatic. Pulmonary/Chest: Effort normal.   Frequent non productive cough   Neurological: He is alert and oriented to person, place, and time. Psychiatric: He has a normal mood and affect. His behavior is normal. Judgment and thought content normal.        Patient Care Team:  Katherine Puga MD as PCP - General (Family Practice)  Katherine Puga MD (Family Practice)  Luis Daniel Guzmán MD (Inactive) (Colon and Rectal Surgery)  Major Walsh MD (Pulmonary Disease)  Polo Carias MD (Allergy)  Nico Blanchard MD (Gastroenterology)  Roberta Crawford MD (Ophthalmology)    Advice/Referrals/Counseling   Education and counseling provided:  Are appropriate based on today's review and evaluation      Assessment/Plan         ICD-10-CM ICD-9-CM    1. Routine general medical examination at a health care facility Z00.00 V70.0    2. Screening for alcoholism Z13.89 V79.1    3. Special screening for malignant neoplasm of prostate Z12.5 V76.44 PSA SCREENING (SCREENING)   4. Family history of prostate cancer Z80.42 V16.42    5. Screening for depression Z13.89 V79.0    6. Moderate persistent asthma without complication K38.47 133.06      Declined eval/tx for cough/asthma today. Due for follow up with Dr. Cat Alamo.  Facilitating    provide ACP documents

## 2017-04-04 NOTE — Clinical Note
Tiff, I'd recommended he plan to fu with Dr. Asaf Juares for his asthma this spring. I'm not sure what the referral status is for him and whether a new one is needed. Please facilitate.  MARE

## 2017-04-04 NOTE — ACP (ADVANCE CARE PLANNING)
Advance Care Planning (ACP) Provider Conversation Snapshot    Date of ACP Conversation: 04/04/17  Persons included in Conversation:  patient  Length of ACP Conversation in minutes:  <16 minutes (Non-Billable)      Conversation Outcomes / Follow-Up Plan:    Will provide copy of ACP documents   MARE

## 2017-05-22 ENCOUNTER — TELEPHONE (OUTPATIENT)
Dept: FAMILY MEDICINE CLINIC | Age: 67
End: 2017-05-22

## 2017-05-22 NOTE — TELEPHONE ENCOUNTER
Called patient had him verify his  he has been told a consult request has been faxed to [facility] and since he has been seen there before he can just call their office to schedule a f/u. Asked if he would like the telephone number there he stated he can find it.

## 2017-05-22 NOTE — TELEPHONE ENCOUNTER
Gaurang Jessica called stating he was informed to call our office if he hasn't heard anything from pulmonology. He would like someone to check into that and give him a return call. [phone].

## 2017-06-29 ENCOUNTER — OFFICE VISIT (OUTPATIENT)
Dept: FAMILY MEDICINE CLINIC | Age: 67
End: 2017-06-29

## 2017-06-29 VITALS
RESPIRATION RATE: 12 BRPM | HEART RATE: 76 BPM | OXYGEN SATURATION: 98 % | TEMPERATURE: 97.7 F | BODY MASS INDEX: 23.88 KG/M2 | DIASTOLIC BLOOD PRESSURE: 82 MMHG | WEIGHT: 166.8 LBS | HEIGHT: 70 IN | SYSTOLIC BLOOD PRESSURE: 118 MMHG
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
## 2017-06-29 DIAGNOSIS — S16.1XXA CERVICAL STRAIN, ACUTE, INITIAL ENCOUNTER: ICD-10-CM
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
## 2017-06-29 DIAGNOSIS — M79.602 LEFT ARM PAIN: Primary | ICD-10-CM
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
## 2017-06-29 NOTE — PROGRESS NOTES
Patient came into the office for neck and jaw pain x 3 days. 1. Have you been to the ER, urgent care clinic since your last visit? Hospitalized since your last visit? No    2. Have you seen or consulted any other health care providers outside of the [address] [place] [address] since your last visit? Include any pap smears or colon screening.  No

## 2017-07-01 ENCOUNTER — PATIENT MESSAGE (OUTPATIENT)
Dept: FAMILY MEDICINE CLINIC | Age: 67
End: 2017-07-01
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
## 2017-07-01 DIAGNOSIS — S16.1XXA CERVICAL STRAIN, ACUTE, INITIAL ENCOUNTER: Primary | ICD-10-CM

## 2017-07-03 RX ORDER — CYCLOBENZAPRINE HCL 5 MG
5-10 TABLET ORAL
Qty: 30 TAB | Refills: 0 | Status: SHIPPED | OUTPATIENT
Start: 2017-07-03 | End: 2017-09-01

## 2017-07-03 NOTE — TELEPHONE ENCOUNTER
From: Kenji Gifford  To: Jenae Rollins MD  Sent: 7/1/2017 1:31 PM EDT  Subject: Prescription Question    Two days after my June 29 visit I continue to experience headache. Nights are worse and sleep is more difficult than usual. The headache now includes the area above and behind my left eye - along with left side of my neck and my shoulder. You and I discussed a muscle relaxer. Please call [facility] in [place] and authorize a prescription. Thanks.

## 2017-09-01 ENCOUNTER — OFFICE VISIT (OUTPATIENT)
Dept: FAMILY MEDICINE CLINIC | Age: 67
End: 2017-09-01

## 2017-09-01 ENCOUNTER — HOSPITAL ENCOUNTER (OUTPATIENT)
Dept: LAB | Age: 67
Discharge: HOME | End: 2017-09-01
Payer: MEDICARE

## 2017-09-01 VITALS
HEIGHT: 70 IN | SYSTOLIC BLOOD PRESSURE: 124 MMHG | TEMPERATURE: 98.1 F | RESPIRATION RATE: 16 BRPM | WEIGHT: 172 LBS | BODY MASS INDEX: 24.62 KG/M2 | DIASTOLIC BLOOD PRESSURE: 74 MMHG | OXYGEN SATURATION: 97 % | HEART RATE: 81 BPM

## 2017-09-01 DIAGNOSIS — Z51.81 ENCOUNTER FOR MEDICATION MONITORING: ICD-10-CM

## 2017-09-01 DIAGNOSIS — R68.89 OTHER GENERAL SYMPTOMS AND SIGNS: ICD-10-CM

## 2017-09-01 DIAGNOSIS — J45.40 MODERATE PERSISTENT ASTHMA WITHOUT COMPLICATION: Primary | ICD-10-CM

## 2017-09-01 DIAGNOSIS — J30.1 ALLERGIC RHINITIS DUE TO POLLEN, UNSPECIFIED RHINITIS SEASONALITY: ICD-10-CM

## 2017-09-01 DIAGNOSIS — K21.9 GASTROESOPHAGEAL REFLUX DISEASE, ESOPHAGITIS PRESENCE NOT SPECIFIED: ICD-10-CM

## 2017-09-01 DIAGNOSIS — E55.9 VITAMIN D DEFICIENCY: ICD-10-CM

## 2017-09-01 DIAGNOSIS — E78.5 HYPERLIPIDEMIA, UNSPECIFIED HYPERLIPIDEMIA TYPE: ICD-10-CM

## 2017-09-01 PROCEDURE — 82306 VITAMIN D 25 HYDROXY: CPT | Performed by: FAMILY MEDICINE

## 2017-09-01 RX ORDER — MONTELUKAST SODIUM 10 MG/1
10 TABLET ORAL DAILY
Qty: 90 TAB | Refills: 4 | Status: SHIPPED | OUTPATIENT
Start: 2017-09-01 | End: 2019-04-23 | Stop reason: SDUPTHER

## 2017-09-01 RX ORDER — ATORVASTATIN CALCIUM 20 MG/1
20 TABLET, FILM COATED ORAL DAILY
Qty: 90 TAB | Refills: 4 | Status: SHIPPED | OUTPATIENT
Start: 2017-09-01 | End: 2018-04-11

## 2017-09-01 RX ORDER — PHENOL/SODIUM PHENOLATE
20 AEROSOL, SPRAY (ML) MUCOUS MEMBRANE DAILY
Qty: 90 TAB | Refills: 4 | Status: SHIPPED | OUTPATIENT
Start: 2017-09-01

## 2017-09-01 RX ORDER — CETIRIZINE HYDROCHLORIDE, PSEUDOEPHEDRINE HYDROCHLORIDE 5; 120 MG/1; MG/1
1 TABLET, FILM COATED, EXTENDED RELEASE ORAL DAILY
Qty: 90 TAB | Refills: 4 | Status: SHIPPED | OUTPATIENT
Start: 2017-09-01 | End: 2021-04-26 | Stop reason: SDUPTHER

## 2017-09-01 NOTE — PROGRESS NOTES
Chief Complaint   Patient presents with    Asthma    Sinus Pain     1. Have you been to the ER, urgent care clinic since your last visit? Hospitalized since your last visit? No    2. Have you seen or consulted any other health care providers outside of the [address] [place] [address] since your last visit? Include any pap smears or colon screening.  No       PF all 3 times 550 L/min

## 2017-09-01 NOTE — PROGRESS NOTES
Brant Weems is a 75 y.o. male  Follow-up moderate persistent asthma most recent visit 1/31/2017. Controller agents: fluticasone salmeterol, montelukast  Rescue: Xopenex    Most recent OV with Dr. Astrid Haines (pulmonary) dated 5/15/2015. Post pulmonary rehab. Using those tools more often these days to good effect. Walking/running 4 miles/hour 4x week. 5/7/2015: PFT consistent with Obstructive airway disease mild in nature. Overall looks like COPD and emphysema but as non smoker  possibility of Asthma with airway remodeling is there. Asthma Control Test 12Yrs Older 9/1/2017 1/31/2017 11/15/2016 7/29/2016 1/31/2014 11/1/2013   In the past 4 weeks, how much of the time did your asthma keep you from getting as much done at work, school, or at home? 3 5 3 3 4 4   During the past 4 weeks how often have you had shortness of breath 4 5 1 3 1 1   During the past 4 weeks often did your asthma symptoms wake up you at night or earlier than usual in the morning 4 5 3 2 2 4   During the past 4 weeks how often have you used your rescue inhaler or nebulizer medication  3 4 2 1 2 1   How would you rate your asthma control during the past 4 weeks 3 4 3 3 3 3   Score 17 23 12 12 12 13     Very pleased with breathing since starting Advair. Voice is raspy, noticeable by others since starting it. Mild decline recently expected with season change. Xopenex    Follow-up allergic rhinitis: On montelukast and cetirizine pseudoephedrine. Overall good control but    Notes constant \"Phlegm\" in this throat. Uses PPI 1-2x/week for heartburn. Good relief. Asking about Lipitor rx. \"I've never taken that. \" We reviewed together our notes post hospital discharge 3/3/2016. ASCVD 10 year risk 14.9 %. I also note history of Vit d def. On replacement. No recent assessment.     Patient Care Team:  Efrem Hernandez MD as PCP - General (Family Practice)  Efrem Hernandez MD (Family Practice)  Jessica Lennon MD (Inactive) (Colon and Rectal Surgery)  Karlene Terry MD (Pulmonary Disease)  Catherine Mcgowan MD (Allergy)  Mckenzie Hurtado MD (Gastroenterology)  Anisha Delgado MD (Ophthalmology)  Allergies   Allergen Reactions    Latex Hives and Itching    Cashew Nut Other (comments)     \"UPSET STOMACH\"    Other Medication Other (comments)     Pt allergic to cats with respiratory, skin reactions. Pt allergic to cashews with upset stomach    Vicodin [Hydrocodone-Acetaminophen] Other (comments)     hallucinations     Outpatient Prescriptions Marked as Taking for the 9/1/17 encounter (Office Visit) with Sam Ren MD   Medication Sig Dispense Refill    fluticasone-salmeterol (ADVAIR) 500-50 mcg/dose diskus inhaler Take 1 Puff by inhalation every twelve (12) hours. 3 Each 1    levalbuterol tartrate (XOPENEX) 45 mcg/actuation inhaler Take 2 Puffs by inhalation every four (4) hours as needed for Wheezing. 1 Inhaler 4    montelukast (SINGULAIR) 10 mg tablet Take 1 Tab by mouth daily. 90 Tab 4    cetirizine-psuedoePHEDrine (ZYRTEC-D) 5-120 mg per tablet Take 1 Tab by mouth daily. 90 Tab 4    levalbuterol (XOPENEX) 1.25 mg/3 mL nebu 3 mL by Nebulization route every six (6) hours as needed. 1 Package 2    aspirin 81 mg chewable tablet Take 1 Tab by mouth daily. 90 Tab 4    amLODIPine (NORVASC) 5 mg tablet Take 1 Tab by mouth daily. 60 Tab 1    albuterol (PROVENTIL VENTOLIN) 2.5 mg /3 mL (0.083 %) nebulizer solution 3 mL by Nebulization route every four (4) hours as needed for Wheezing. 1 Package 1    FA/MV-MN/MIN AA JANETT/SAW PAL (SAW PALMETO-MULTIVIT-MIN-AA-FA PO) Take 1 Tab by mouth daily.  vitamin e (E GEMS) 1,000 unit capsule Take 1,000 Units by mouth daily.  Cholecalciferol, Vitamin D3, (VITAMIN D) 2,000 unit Cap Take 1 Cap by mouth daily. 30 Cap 11     Patient Active Problem List    Diagnosis    Hyperlipidemia     3/6/2016: ASCVD 10 year risk 14.9 %. Lifetime risk NA . Statin initiated.       Diastolic dysfunction     2/29/2016: inpatient echo (chest pain) EF 46-29% diastolic dysfunction I. Dr. Herman Mott. CCB initiated 3/6/2016.  Essential hypertension     3/6/2016: ASCVD 10 year risk 14.9 %. Lifetime risk NA. Aspirin initiated. Switched from thiazide to CCB for context of diastolic dysfunction.  Elevated blood pressure    Elevated PSA    Hypertrophy of prostate with urinary obstruction and other lower urinary tract symptoms (LUTS)    Family history of prostate cancer    Lumbosacral radiculopathy due to degenerative joint disease of spine    Cervical radiculopathy due to degenerative joint disease of spine    Allergic rhinitis    Moderate persistent asthma without complication     1/3/3566: PFT consistent with Obstructive airway disease mild in nature. Overall looks like COPD and emphysema but as non smoker  possibility of Asthma with airway remodeling is there.        Neutropenia (HCC)    Cough    Vitamin D deficiency     Past Medical History:   Diagnosis Date    Asthma     Back problem     Burning with urination     Cough     Depression     GERD (gastroesophageal reflux disease)     Poor appetite     Trouble in sleeping     Wheezing      Past Surgical History:   Procedure Laterality Date    HX ORCHIECTOMY  1999    left, varicocele with complications     Family History   Problem Relation Age of Onset    Hypertension Mother     Heart Disease Mother     Arthritis-rheumatoid Mother     Hypertension Father     Heart Attack Father 48    Cancer Brother 72     prostate    Cancer Brother 61     prostate    Colon Cancer Brother     Heart Attack Brother 48    Cancer Brother 66     prostate    Diabetes Brother     Heart Attack Sister 61    Colon Polyps Sister     Heart Attack Brother 54    Heart Attack Sister 72     Social History     Social History    Marital status:      Spouse name: N/A    Number of children: N/A    Years of education: N/A     Occupational History  Not on file. Social History Main Topics    Smoking status: Never Smoker    Smokeless tobacco: Never Used    Alcohol use 3.0 oz/week     6 Cans of beer per week      Comment: occ    Drug use: No    Sexual activity: Yes     Partners: Female     Other Topics Concern    Not on file     Social History Narrative         Review of Systems   Constitutional: Negative for fever and malaise/fatigue. Visit Vitals    /74 (BP 1 Location: Left arm, BP Patient Position: Sitting)    Pulse 81    Temp 98.1 °F (36.7 °C)    Resp 16    Ht 5' 10\" (1.778 m)    Wt 172 lb (78 kg)    SpO2 97%     L/min    BMI 24.68 kg/m2     Physical Exam   Constitutional: He is oriented to person, place, and time. He appears well-developed. No distress. HENT:   Head: Normocephalic and atraumatic. Pulmonary/Chest: Effort normal.   Neurological: He is alert and oriented to person, place, and time. Psychiatric: He has a normal mood and affect. His behavior is normal. Judgment and thought content normal.     Results for orders placed or performed during the hospital encounter of 04/04/17   PSA SCREENING (SCREENING)   Result Value Ref Range    Prostate Specific Ag 1.9 0.0 - 4.0 ng/mL       Lab Results   Component Value Date/Time    Cholesterol, total 240 02/29/2016 04:43 AM    HDL Cholesterol 81 02/29/2016 04:43 AM    LDL, calculated 141.4 02/29/2016 04:43 AM    VLDL, calculated 17.6 02/29/2016 04:43 AM    Triglyceride 88 02/29/2016 04:43 AM    CHOL/HDL Ratio 3.0 02/29/2016 04:43 AM           ICD-10-CM ICD-9-CM    1. Moderate persistent asthma without complication P44.27 622.16 montelukast (SINGULAIR) 10 mg tablet      cetirizine-psuedoePHEDrine (ZYRTEC-D) 5-120 mg per tablet      REFERRAL TO PULMONARY DISEASE   2. Allergic rhinitis due to pollen, unspecified rhinitis seasonality J30.1 477.0 montelukast (SINGULAIR) 10 mg tablet      cetirizine-psuedoePHEDrine (ZYRTEC-D) 5-120 mg per tablet   3.  Gastroesophageal reflux disease, esophagitis presence not specified K21.9 530.81 Omeprazole delayed release (PRILOSEC D/R) 20 mg tablet   4. Hyperlipidemia, unspecified hyperlipidemia type E78.5 272.4 atorvastatin (LIPITOR) 20 mg tablet   5. Vitamin D deficiency E55.9 268.9 VITAMIN D, 25 HYDROXY     Decline in control of asthma and raspiness of voice could both be due to suboptimal control of GERD. The 2 items could also be unrelated with the raspiness being a side effect of Advair. Trial increase PPI to daily. Return to Dr. Nawaf Meza. Advised to anticipate new PFTs to assess status of remodeling. Increased risk CV disease. Initiate statin. Ensure Vit D well controlled to reduce risk of myalgias. Decline influenza vaccine despite my strong recommendation. The patient understands our medical plan. Alternatives have been explained and offered. The risks, benefits and significant side effects of all medications have been reviewed. All questions have been addressed. He is encouraged to employ the information provided in the after visit summary, which was reviewed. He is instructed to call the clinic if he has not been notified either by phone or through [address] with the results of his tests or with an appointment plan for any referrals within 1 week(s). No news is not good news; it's no news. The patient  is to call if his condition worsens or fails to improve or if significant side effects are experienced. Follow-up Disposition:  Return in about 1 year (around 9/1/2018) for vit d, lipids, GERD. Dragon medical dictation software was used for portions of this report. Unintended voice recognition errors may occur.

## 2017-09-01 NOTE — MR AVS SNAPSHOT
Visit Information Date & Time Provider Department Dept. Phone Encounter #  
 9/1/2017 10:00 AM Riana Zambrano MD [facility] [phone] [id number] Follow-up Instructions Return in about 1 year (around 9/1/2018) for vit d, lipids, GERD. Upcoming Health Maintenance Date Due ZOSTER VACCINE AGE 60> 6/2/2009 GLAUCOMA SCREENING Q2Y 12/1/2015 MEDICARE YEARLY EXAM 4/5/2018 Pneumococcal 65+ Low/Medium Risk (2 of 2 - PPSV23) 5/8/2018 COLONOSCOPY 6/18/2020 DTaP/Tdap/Td series (2 - Td) 9/9/2020 Allergies as of 9/1/2017  Review Complete On: 9/1/2017 By: Riana Zambrano MD  
  
 Severity Noted Reaction Type Reactions Latex  04/08/2015    Hives, Itching Cashew Nut  01/07/2016    Other (comments) \"UPSET STOMACH\" Other Medication  01/23/2013    Other (comments) Pt allergic to cats with respiratory, skin reactions. Pt allergic to cashews with upset stomach Vicodin [Hydrocodone-acetaminophen]  05/15/2014    Other (comments)  
 hallucinations Current Immunizations  Never Reviewed Name Date Influenza Vaccine 11/1/2013 Pneumococcal Conjugate (PCV-13) 2/9/2016 Pneumococcal Polysaccharide (PPSV-23) 5/8/2013 TDAP Vaccine 9/9/2010 Not reviewed this visit You Were Diagnosed With   
  
 Codes Comments Moderate persistent asthma without complication    -  Primary ICD-10-CM: J45.40 ICD-9-CM: 493.90 Allergic rhinitis due to pollen, unspecified rhinitis seasonality     ICD-10-CM: J30.1 ICD-9-CM: 477.0 Gastroesophageal reflux disease, esophagitis presence not specified     ICD-10-CM: K21.9 ICD-9-CM: 530.81 Hyperlipidemia, unspecified hyperlipidemia type     ICD-10-CM: E78.5 ICD-9-CM: 272.4 Vitamin D deficiency     ICD-10-CM: E55.9 ICD-9-CM: 268.9 Vitals BP Pulse Temp Resp Height(growth percentile) Weight(growth percentile) 124/74 (BP 1 Location: Left arm, BP Patient Position: Sitting) 81 98.1 °F (36.7 °C) 16 5' 10\" (1.778 m) 172 lb (78 kg) SpO2 PF BMI Smoking Status 97% 550 L/min 24.68 kg/m2 Never Smoker Vitals History BMI and BSA Data Body Mass Index Body Surface Area  
 24.68 kg/m 2 1.96 m 2 Preferred Pharmacy Pharmacy Name Phone [address] [facility] [phone] Your Updated Medication List  
  
   
This list is accurate as of: 9/1/17 10:50 AM.  Always use your most recent med list.  
  
  
  
  
 albuterol 2.5 mg /3 mL (0.083 %) nebulizer solution Commonly known as:  PROVENTIL VENTOLIN  
3 mL by Nebulization route every four (4) hours as needed for Wheezing. amLODIPine 5 mg tablet Commonly known as:  Femi Hagan Take 1 Tab by mouth daily. aspirin 81 mg chewable tablet Take 1 Tab by mouth daily. atorvastatin 20 mg tablet Commonly known as:  LIPITOR Take 1 Tab by mouth daily. cetirizine-psuedoePHEDrine 5-120 mg per tablet Commonly known as:  ZyrTEC-D Take 1 Tab by mouth daily. Cholecalciferol (Vitamin D3) 2,000 unit Cap capsule Commonly known as:  VITAMIN D Take 1 Cap by mouth daily. fluticasone-salmeterol 500-50 mcg/dose diskus inhaler Commonly known as:  ADVAIR Take 1 Puff by inhalation every twelve (12) hours. levalbuterol 1.25 mg/3 mL Nebu Commonly known as:  XOPENEX  
3 mL by Nebulization route every six (6) hours as needed. levalbuterol tartrate 45 mcg/actuation inhaler Commonly known as:  Leopoldo Else Take 2 Puffs by inhalation every four (4) hours as needed for Wheezing.  
  
 montelukast 10 mg tablet Commonly known as:  SINGULAIR Take 1 Tab by mouth daily. Omeprazole delayed release 20 mg tablet Commonly known as:  PRILOSEC D/R Take 1 Tab by mouth daily. SAW PALMETO-MULTIVIT-MIN-AA-FA PO Take 1 Tab by mouth daily. vitamin e 1,000 unit capsule Commonly known as:  E GEMS Take 1,000 Units by mouth daily. Prescriptions Printed Refills  
 cetirizine-psuedoePHEDrine (ZYRTEC-D) 5-120 mg per tablet 4 Sig: Take 1 Tab by mouth daily. Class: Print Route: Oral  
 Omeprazole delayed release (PRILOSEC D/R) 20 mg tablet 4 Sig: Take 1 Tab by mouth daily. Class: Print Route: Oral  
  
Prescriptions Sent to Pharmacy Refills  
 montelukast (SINGULAIR) 10 mg tablet 4 Sig: Take 1 Tab by mouth daily. Class: Normal  
 Pharmacy: [address], [address] Ph #: [phone] Route: Oral  
 atorvastatin (LIPITOR) 20 mg tablet 4 Sig: Take 1 Tab by mouth daily. Class: Normal  
 Pharmacy: [address], [address] Ph #: [phone] Route: Oral  
  
We Performed the Following REFERRAL TO PULMONARY DISEASE [WRQ03 Custom] Comments:  
 Unique Aragon is a 75 y.o. male patient of Dr. Joselyn Clifford with moderate persistent asthma, suboptimal control due for surveillance. Please evaluate and treat as indicated. Thank you. Follow-up Instructions Return in about 1 year (around 9/1/2018) for vit d, lipids, GERD. To-Do List   
 09/01/2017 Lab:  VITAMIN D, 25 HYDROXY Referral Information Referral ID Referred By Referred To  
  
 [id number] Suly MATHIS MD   
   [address] [place] [address] [place], [address] Phone: [phone] Fax: [phone] Visits Status Start Date End Date 1 New Request 9/1/17 9/1/18 If your referral has a status of pending review or denied, additional information will be sent to support the outcome of this decision. Introducing [facility]! Dear Mykel Sender: Thank you for requesting a [employer] account. Our records indicate that you already have an active [employer] account.   You can access your account anytime at [email or web address] Did you know that you can access your hospital and ER discharge instructions at any time in [email or web address]? You can also review all of your test results from your hospital stay or ER visit. Additional Information If you have questions, please visit the Frequently Asked Questions section of the [email or web address] website at [email or web address] Remember, [email or web address] is NOT to be used for urgent needs. For medical emergencies, dial 911. Now available from your iPhone and Android! Please provide this summary of care documentation to your next provider. Your primary care clinician is listed as Riana Zambrano. If you have any questions after today's visit, please call [phone].

## 2017-09-02 LAB — 25(OH)D3 SERPL-MCNC: 35.6 NG/ML (ref 30–100)

## 2018-03-20 ENCOUNTER — OFFICE VISIT (OUTPATIENT)
Dept: FAMILY MEDICINE CLINIC | Age: 68
End: 2018-03-20

## 2018-03-20 ENCOUNTER — HOSPITAL ENCOUNTER (OUTPATIENT)
Dept: LAB | Age: 68
Discharge: HOME | End: 2018-03-20
Payer: MEDICARE

## 2018-03-20 VITALS
OXYGEN SATURATION: 98 % | DIASTOLIC BLOOD PRESSURE: 76 MMHG | SYSTOLIC BLOOD PRESSURE: 118 MMHG | TEMPERATURE: 98.3 F | HEART RATE: 72 BPM | WEIGHT: 162.4 LBS | RESPIRATION RATE: 12 BRPM | BODY MASS INDEX: 23.25 KG/M2 | HEIGHT: 70 IN

## 2018-03-20 DIAGNOSIS — R35.0 URINARY FREQUENCY: Primary | ICD-10-CM

## 2018-03-20 DIAGNOSIS — N40.1 BPH WITH OBSTRUCTION/LOWER URINARY TRACT SYMPTOMS: ICD-10-CM

## 2018-03-20 DIAGNOSIS — R35.0 URINARY FREQUENCY: ICD-10-CM

## 2018-03-20 DIAGNOSIS — R97.20 ELEVATED PSA: ICD-10-CM

## 2018-03-20 DIAGNOSIS — N13.8 BPH WITH OBSTRUCTION/LOWER URINARY TRACT SYMPTOMS: ICD-10-CM

## 2018-03-20 DIAGNOSIS — Z80.42 FAMILY HISTORY OF PROSTATE CANCER: ICD-10-CM

## 2018-03-20 LAB
BILIRUB UR QL STRIP: NEGATIVE
GLUCOSE UR-MCNC: NEGATIVE MG/DL
KETONES P FAST UR STRIP-MCNC: NEGATIVE MG/DL
PH UR STRIP: 6.5 [PH] (ref 4.6–8)
PROT UR QL STRIP: NEGATIVE
SP GR UR STRIP: 1.01 (ref 1–1.03)
UA UROBILINOGEN AMB POC: NORMAL (ref 0.2–1)
URINALYSIS CLARITY POC: CLEAR
URINALYSIS COLOR POC: YELLOW
URINE BLOOD POC: NEGATIVE
URINE LEUKOCYTES POC: NEGATIVE
URINE NITRITES POC: NEGATIVE

## 2018-03-20 PROCEDURE — 87086 URINE CULTURE/COLONY COUNT: CPT | Performed by: FAMILY MEDICINE

## 2018-03-20 RX ORDER — TAMSULOSIN HYDROCHLORIDE 0.4 MG/1
0.4 CAPSULE ORAL DAILY
Qty: 30 CAP | Refills: 1 | Status: SHIPPED | OUTPATIENT
Start: 2018-03-20 | End: 2018-06-14 | Stop reason: ALTCHOICE

## 2018-03-20 NOTE — PATIENT INSTRUCTIONS

## 2018-03-20 NOTE — PROGRESS NOTES
Subjective:     Kim Henning is a 75 y.o. male who complains of urinary frequency for 6 days, associated with urgency. \"Incredibly, horribly frequent. \" 2x/hour, small volumes. Complete voiding. Stable since onset. Patient denies flank pain, vomiting, fever, discharge, dysuria, hematuria, change in bowel habits. Patient does not have a history of recurrent UTI. Prior history of BPH (he forgot about that)    Notes from Dr. Tiffanie Reed 2/4/2015 whom he saw for elevated PSA in setting of fam hx prostate cancer. Reviewed prior therapy with Flomax. Recalls good experience. Due for follow up of elevated PSA    Xolair in January for moderate persistent asthma. (\"This is the best I've felt in years. \" able to sing now, sleeping better, going to the gym with intended weight loss) Otherwise no change in meds. No OTC products. Patient Care Team:  Clark White MD as PCP - General (Family Practice)  Clark White MD (Family Practice)  Robert Moya MD (Inactive) (Colon and Rectal Surgery)  Tristen Oliveira MD (Pulmonary Disease)  Kaelyn Mahmood MD (Allergy)  Lit Shay MD (Gastroenterology)  Ivy Sharif MD (Ophthalmology)  Allergies   Allergen Reactions    Latex Hives and Itching    Cashew Nut Other (comments)     \"UPSET STOMACH\"    Other Medication Other (comments)     Pt allergic to cats with respiratory, skin reactions. Pt allergic to cashews with upset stomach    Vicodin [Hydrocodone-Acetaminophen] Other (comments)     hallucinations     Outpatient Prescriptions Marked as Taking for the 3/20/18 encounter (Office Visit) with Clark White MD   Medication Sig Dispense Refill    omalizumab Effiebailey Sawyer) 150 mg solr by SubCUTAneous route once.  montelukast (SINGULAIR) 10 mg tablet Take 1 Tab by mouth daily. 90 Tab 4    cetirizine-psuedoePHEDrine (ZYRTEC-D) 5-120 mg per tablet Take 1 Tab by mouth daily.  90 Tab 4    Omeprazole delayed release (PRILOSEC D/R) 20 mg tablet Take 1 Tab by mouth daily. 90 Tab 4    fluticasone-salmeterol (ADVAIR) 500-50 mcg/dose diskus inhaler Take 1 Puff by inhalation every twelve (12) hours. 3 Each 1    levalbuterol tartrate (XOPENEX) 45 mcg/actuation inhaler Take 2 Puffs by inhalation every four (4) hours as needed for Wheezing. 1 Inhaler 4    levalbuterol (XOPENEX) 1.25 mg/3 mL nebu 3 mL by Nebulization route every six (6) hours as needed. 1 Package 2    aspirin 81 mg chewable tablet Take 1 Tab by mouth daily. 90 Tab 4    albuterol (PROVENTIL VENTOLIN) 2.5 mg /3 mL (0.083 %) nebulizer solution 3 mL by Nebulization route every four (4) hours as needed for Wheezing. 1 Package 1    FA/MV-MN/MIN AA JANETT/SAW PAL (SAW PALMETO-MULTIVIT-MIN-AA-FA PO) Take 1 Tab by mouth daily.  vitamin e (E GEMS) 1,000 unit capsule Take 1,000 Units by mouth daily.  Cholecalciferol, Vitamin D3, (VITAMIN D) 2,000 unit Cap Take 1 Cap by mouth daily. 30 Cap 11     Patient Active Problem List    Diagnosis    Hyperlipidemia     3/6/2016: ASCVD 10 year risk 14.9 %. Lifetime risk NA . Statin initiated.  Diastolic dysfunction     2/98/6342: inpatient echo (chest pain) EF 72-74% diastolic dysfunction I. Dr. Val Zayas. CCB initiated 3/6/2016. Per Dr. Anh Neely (pulm) re comorbid asthma/COPD 9/8/2017, avoid ACE I. Favor BB and ARB.  Essential hypertension     3/6/2016: ASCVD 10 year risk 14.9 %. Lifetime risk NA. Aspirin initiated. Switched from thiazide to CCB for context of diastolic dysfunction.  Elevated blood pressure    Elevated PSA    BPH with obstruction/lower urinary tract symptoms    Family history of prostate cancer    Lumbosacral radiculopathy due to degenerative joint disease of spine    Cervical radiculopathy due to degenerative joint disease of spine    Allergic rhinitis    Moderate persistent asthma without complication     3/0/8747: PFT consistent with Obstructive airway disease mild in nature.   Overall looks like COPD and emphysema but as non smoker  possibility of Asthma with airway remodeling is there.  Cough    Vitamin D deficiency     Past Medical History:   Diagnosis Date    Asthma     Back problem     Burning with urination     Cough     Depression     GERD (gastroesophageal reflux disease)     Poor appetite     Trouble in sleeping     Wheezing      Past Surgical History:   Procedure Laterality Date    HX ORCHIECTOMY  1999    left, varicocele with complications     Family History   Problem Relation Age of Onset    Hypertension Mother     Heart Disease Mother     Arthritis-rheumatoid Mother     Hypertension Father     Heart Attack Father 48    Cancer Brother 72     prostate    Cancer Brother 61     prostate    Colon Cancer Brother     Heart Attack Brother 48    Cancer Brother 66     prostate    Diabetes Brother     Heart Attack Sister 61    Colon Polyps Sister     Heart Attack Brother 54    Heart Attack Sister 72     Social History     Social History    Marital status:      Spouse name: N/A    Number of children: N/A    Years of education: N/A     Occupational History    Not on file. Social History Main Topics    Smoking status: Never Smoker    Smokeless tobacco: Never Used    Alcohol use 3.0 oz/week     6 Cans of beer per week      Comment: occ    Drug use: No    Sexual activity: Yes     Partners: Female     Other Topics Concern    Not on file     Social History Narrative            Review of Systems  Pertinent items are noted in HPI.     Objective:     Visit Vitals    /76 (BP 1 Location: Right arm, BP Patient Position: Sitting)    Pulse 72    Temp 98.3 °F (36.8 °C) (Oral)    Resp 12    Ht 5' 10\" (1.778 m)    Wt 162 lb 6.4 oz (73.7 kg)    SpO2 98%    BMI 23.3 kg/m2         Results for orders placed or performed in visit on 03/20/18   AMB POC URINALYSIS DIP STICK AUTO W/O MICRO     Status: None   Result Value Ref Range Status    Color (UA POC) Yellow  Final    Clarity (UA POC) Clear  Final    Glucose (UA POC) Negative Negative Final    Bilirubin (UA POC) Negative Negative Final    Ketones (UA POC) Negative Negative Final    Specific gravity (UA POC) 1.015 1.001 - 1.035 Final    Blood (UA POC) Negative Negative Final    pH (UA POC) 6.5 4.6 - 8.0 Final    Protein (UA POC) Negative Negative Final    Urobilinogen (UA POC) 0.2 mg/dL 0.2 - 1 Final    Nitrites (UA POC) Negative Negative Final    Leukocyte esterase (UA POC) Negative Negative Final         Lab Results   Component Value Date/Time    Prostate Specific Ag 1.9 04/04/2017 10:47 AM    Prostate Specific Ag 2.32 02/04/2015 02:25 PM    Prostate Specific Ag 3.1 01/08/2015 02:08 PM    Prostate Specific Ag 1.8 10/18/2012 01:30 PM    Prostate Specific Ag 3.5 04/21/2010 08:36 PM    Prostate Specific Ag 0.9 10/26/2009 09:30 AM               Assessment/Plan:         ICD-10-CM ICD-9-CM    1. Urinary frequency R35.0 788.41 AMB POC URINALYSIS DIP STICK AUTO W/O MICRO      CULTURE, URINE   2. BPH with obstruction/lower urinary tract symptoms N40.1 600.01 tamsulosin (FLOMAX) 0.4 mg capsule    N13.8 599.69      Established chronic problem with worsening control vs superimposed infection (less likely clinically)    Resuming tamsulosin  Declined prostate exam today  Will pursue at next visit unless robust clinical change - after drawing PSA. Declined return to Dr. Nasreen Kinney at this point. The patient understands our medical plan. Alternatives have been explained and offered. The risks, benefits and significant side effects of all medications have been reviewed. Anticipated time course and progression of condition reviewed. All questions have been addressed. He is encouraged to employ the information provided in the after visit summary, which was reviewed. He is instructed to call the clinic if he has not been notified either by phone or through [address] with the results of his tests or with an appointment plan for any referrals within 1 week(s). No news is not good news; it's no news. The patient  is to call if his condition worsens or fails to improve or if significant side effects are experienced. Follow-up Disposition:  Return in about 2 weeks (around 4/2/2018) for urinary frequency follow up in conjunction with "1,2,3 Listo"6 Rohan . [employer] dictation software was used for portions of this report. Unintended voice recognition errors may occur.

## 2018-03-20 NOTE — MR AVS SNAPSHOT
[address]mer [address] [facility] 
[phone] Patient: Megha Sher MRN: [id number] FHF:[birth year] Visit Information Date & Time Provider Department Dept. Phone Encounter #  
 3/20/2018  2:00 PM Suzy Bowling MD [facility] [phone] [id number] Follow-up Instructions Return in about 2 weeks (around 4/2/2018) for urinary frequency follow up in conjunction with [address] Your Appointments 4/2/2018  8:30 AM  
Office Visit with Nando Marks MD  
[facility] [address]) Appt Note: [address] SUB  due 4/2018  
 [address] [facility] [address] [place] [address] Road  
[phone]  
  
   
 [facility] [address] [place] [address] Upcoming Health Maintenance Date Due ZOSTER VACCINE AGE 60> 6/2/2009 GLAUCOMA SCREENING Q2Y 12/1/2015 MEDICARE YEARLY EXAM 4/5/2018 Pneumococcal 65+ Low/Medium Risk (2 of 2 - PPSV23) 5/8/2018 COLONOSCOPY 6/18/2020 DTaP/Tdap/Td series (2 - Td) 9/9/2020 Allergies as of 3/20/2018  Review Complete On: 3/20/2018 By: Suzy Bowling MD  
  
 Severity Noted Reaction Type Reactions Latex  04/08/2015    Hives, Itching Cashew Nut  01/07/2016    Other (comments) \"UPSET STOMACH\" Other Medication  01/23/2013    Other (comments) Pt allergic to cats with respiratory, skin reactions. Pt allergic to cashews with upset stomach Vicodin [Hydrocodone-acetaminophen]  05/15/2014    Other (comments)  
 hallucinations Current Immunizations  Never Reviewed Name Date Influenza Vaccine 11/1/2013 Pneumococcal Conjugate (PCV-13) 2/9/2016 Pneumococcal Polysaccharide (PPSV-23) 5/8/2013 TDAP Vaccine 9/9/2010 Not reviewed this visit You Were Diagnosed With   
  
 Codes Comments Urinary frequency    -  Primary ICD-10-CM: R35.0 ICD-9-CM: 788.41   
 BPH with obstruction/lower urinary tract symptoms     ICD-10-CM: N40.1, N13.8 ICD-9-CM: 600.01, 599.69 Vitals BP Pulse Temp Resp Height(growth percentile) Weight(growth percentile) 118/76 (BP 1 Location: Right arm, BP Patient Position: Sitting) 72 98.3 °F (36.8 °C) (Oral) 12 5' 10\" (1.778 m) 162 lb 6.4 oz (73.7 kg) SpO2 BMI Smoking Status 98% 23.3 kg/m2 Never Smoker BMI and BSA Data Body Mass Index Body Surface Area  
 23.3 kg/m 2 1.91 m 2 Preferred Pharmacy Pharmacy Name Phone [facility], [address] [place] [address] Rd [phone] Your Updated Medication List  
  
   
This list is accurate as of 3/20/18  2:47 PM.  Always use your most recent med list.  
  
  
  
  
 albuterol 2.5 mg /3 mL (0.083 %) nebulizer solution Commonly known as:  PROVENTIL VENTOLIN  
3 mL by Nebulization route every four (4) hours as needed for Wheezing. amLODIPine 5 mg tablet Commonly known as:  Deborha Cords Take 1 Tab by mouth daily. aspirin 81 mg chewable tablet Take 1 Tab by mouth daily. atorvastatin 20 mg tablet Commonly known as:  LIPITOR Take 1 Tab by mouth daily. cetirizine-psuedoePHEDrine 5-120 mg per tablet Commonly known as:  ZyrTEC-D Take 1 Tab by mouth daily. Cholecalciferol (Vitamin D3) 2,000 unit Cap capsule Commonly known as:  VITAMIN D Take 1 Cap by mouth daily. fluticasone-salmeterol 500-50 mcg/dose diskus inhaler Commonly known as:  ADVAIR Take 1 Puff by inhalation every twelve (12) hours. levalbuterol 1.25 mg/3 mL Nebu Commonly known as:  XOPENEX  
3 mL by Nebulization route every six (6) hours as needed. levalbuterol tartrate 45 mcg/actuation inhaler Commonly known as:  Pegge Mention Take 2 Puffs by inhalation every four (4) hours as needed for Wheezing.  
  
 montelukast 10 mg tablet Commonly known as:  SINGULAIR Take 1 Tab by mouth daily. Omeprazole delayed release 20 mg tablet Commonly known as:  PRILOSEC D/R Take 1 Tab by mouth daily. SAW PALMMASON-MULTIVIT-MIN-AA-FA PO Take 1 Tab by mouth daily. tamsulosin 0.4 mg capsule Commonly known as:  FLOMAX Take 1 Cap by mouth daily. vitamin e 1,000 unit capsule Commonly known as:  E GEMS Take 1,000 Units by mouth daily. XOLAIR 150 mg Solr Generic drug:  omalizumab  
by SubCUTAneous route once. Prescriptions Sent to Pharmacy Refills  
 tamsulosin (FLOMAX) 0.4 mg capsule 1 Sig: Take 1 Cap by mouth daily. Class: Normal  
 Pharmacy: [address] [facility], [address] [place] [address] #: [phone] Route: Oral  
  
We Performed the Following AMB POC URINALYSIS DIP STICK AUTO W/O MICRO [96585 CPT(R)] Follow-up Instructions Return in about 2 weeks (around 4/2/2018) for urinary frequency follow up in conjunction with [facility] To-Do List   
 03/20/2018 Microbiology:  CULTURE, URINE Patient Instructions Benign Prostatic Hyperplasia: Care Instructions Your Care Instructions Benign prostatic hyperplasia, or BPH, is an enlarged prostate gland. The prostate is a small gland that makes some of the fluid in semen. Prostate enlargement happens to almost all men as they age. It is usually not serious. BPH does not cause prostate cancer. As the prostate gets bigger, it may partly block the flow of urine. You may have a hard time getting a urine stream started or completely stopped. BPH can cause dribbling. You may have a weak urine stream, or you may have to urinate more often than you used to, especially at night. Most men find these problems easy to manage. You do not need treatment unless your symptoms bother you a lot or you have other problems, such as bladder infections or stones. In these cases, medicines may help.  Surgery is not needed unless the urine flow is blocked or the symptoms do not get better with medicine. Follow-up care is a key part of your treatment and safety. Be sure to make and go to all appointments, and call your doctor if you are having problems. It's also a good idea to know your test results and keep a list of the medicines you take. How can you care for yourself at home? · Take plenty of time to urinate. Try to relax. · Try \"double voiding. \" Urinate as much you can, relax for a few moments, and then try to urinate again. · Sit on the toilet to urinate. · Read or think of other things while you are waiting. · Turn on a faucet, or try to picture running water. Some men find that this helps get their urine flowing. · If dribbling is a problem, wash your penis daily to avoid skin irritation and infection. · Avoid caffeine and alcohol. These drinks will increase how often you need to urinate. Spread your fluid intake throughout the day. If the urge to urinate often wakes you at night, limit your fluid intake in the evening. Urinate right before you go to bed. · Many over-the-counter cold and allergy medicines can make the symptoms of BPH worse. Avoid antihistamines, decongestants, and allergy pills, if you can. Read the warnings on the package. · If you take any prescription medicines, especially tranquilizers or antidepressants, ask your doctor or pharmacist whether they can cause urination problems. There may be other medicines you can use that do not cause urinary problems. · Be safe with medicines. Take your medicines exactly as prescribed. Call your doctor if you think you are having a problem with your medicine. When should you call for help? Call your doctor now or seek immediate medical care if: 
? · You cannot urinate at all. ? · You have symptoms of a urinary infection. For example: ¨ You have blood or pus in your urine. ¨ You have pain in your back just below your rib cage. This is called flank pain. ¨ You have a fever, chills, or body aches. ¨ It hurts to urinate. ¨ You have groin or belly pain. ? Watch closely for changes in your health, and be sure to contact your doctor if: 
? · It hurts when you ejaculate. ? · Your urinary problems get a lot worse or bother you a lot. Where can you learn more? Go to http://fredis-haja.info/. Enter M011 in the search box to learn more about \"Benign Prostatic Hyperplasia: Care Instructions. \" Current as of: March 14, 2017 Content Version: 11.4 © 9766-6891 Airu. Care instructions adapted under license by Accu[employer]raft (which disclaims liability or warranty for this information). If you have questions about a medical condition or this instruction, always ask your healthcare professional. [address] any warranty or liability for your use of this information. Introducing [facility] & HEALTH SERVICES! Dear Bryon Geronimo: Thank you for requesting a bizsol account. Our records indicate that you already have an active bizsol account. You can access your account anytime at [email or web address] Did you know that you can access your hospital and ER discharge instructions at any time in bizsol? You can also review all of your test results from your hospital stay or ER visit. Additional Information If you have questions, please visit the Frequently Asked Questions section of the bizsol website at [email or web address] Remember, bizsol is NOT to be used for urgent needs. For medical emergencies, dial 911. Now available from your iPhone and Android! Please provide this summary of care documentation to your next provider. Your primary care clinician is listed as Carrie Wright. If you have any questions after today's visit, please call [phone].

## 2018-03-20 NOTE — PROGRESS NOTES
Patient came to the office for urination frequency x 6 days. No pain no blood no discharge just urgency and frequency. 1. Have you been to the ER, urgent care clinic since your last visit? Hospitalized since your last visit? No    2. Have you seen or consulted any other health care providers outside of the [address] [place] [address] since your last visit? Include any pap smears or colon screening.  No

## 2018-03-22 LAB
BACTERIA SPEC CULT: NORMAL
SERVICE CMNT-IMP: NORMAL

## 2018-04-02 ENCOUNTER — OFFICE VISIT (OUTPATIENT)
Dept: FAMILY MEDICINE CLINIC | Age: 68
End: 2018-04-02

## 2018-04-02 VITALS
TEMPERATURE: 99.8 F | RESPIRATION RATE: 15 BRPM | OXYGEN SATURATION: 95 % | BODY MASS INDEX: 23.05 KG/M2 | HEIGHT: 70 IN | SYSTOLIC BLOOD PRESSURE: 118 MMHG | DIASTOLIC BLOOD PRESSURE: 72 MMHG | WEIGHT: 161 LBS | HEART RATE: 71 BPM

## 2018-04-02 DIAGNOSIS — R52 BODY ACHES: ICD-10-CM

## 2018-04-02 DIAGNOSIS — R05.9 COUGH: Primary | ICD-10-CM

## 2018-04-02 LAB
QUICKVUE INFLUENZA TEST: NEGATIVE
VALID INTERNAL CONTROL?: YES

## 2018-04-02 RX ORDER — AZITHROMYCIN 250 MG/1
TABLET, FILM COATED ORAL
Qty: 6 TAB | Refills: 0 | Status: SHIPPED | OUTPATIENT
Start: 2018-04-02 | End: 2018-04-07

## 2018-04-02 NOTE — PATIENT INSTRUCTIONS

## 2018-04-02 NOTE — PROGRESS NOTES
Ty Silva is a 75 y.o. male  presents for congestion for about a week. He has had assoc cough and fever. Has tried otc meds but it has not helped. No weakness or numbness. Allergies   Allergen Reactions    Latex Hives and Itching    Cashew Nut Other (comments)     \"UPSET STOMACH\"    Other Medication Other (comments)     Pt allergic to cats with respiratory, skin reactions. Pt allergic to cashews with upset stomach    Vicodin [Hydrocodone-Acetaminophen] Other (comments)     hallucinations     Outpatient Prescriptions Marked as Taking for the 4/2/18 encounter (Office Visit) with Lizbeth Conner MD   Medication Sig Dispense Refill    omalizumab Charu Sin) 150 mg solr by SubCUTAneous route once.  tamsulosin (FLOMAX) 0.4 mg capsule Take 1 Cap by mouth daily. 30 Cap 1    montelukast (SINGULAIR) 10 mg tablet Take 1 Tab by mouth daily. 90 Tab 4    cetirizine-psuedoePHEDrine (ZYRTEC-D) 5-120 mg per tablet Take 1 Tab by mouth daily. 90 Tab 4    Omeprazole delayed release (PRILOSEC D/R) 20 mg tablet Take 1 Tab by mouth daily. 90 Tab 4    atorvastatin (LIPITOR) 20 mg tablet Take 1 Tab by mouth daily. 90 Tab 4    fluticasone-salmeterol (ADVAIR) 500-50 mcg/dose diskus inhaler Take 1 Puff by inhalation every twelve (12) hours. 3 Each 1    levalbuterol tartrate (XOPENEX) 45 mcg/actuation inhaler Take 2 Puffs by inhalation every four (4) hours as needed for Wheezing. 1 Inhaler 4    levalbuterol (XOPENEX) 1.25 mg/3 mL nebu 3 mL by Nebulization route every six (6) hours as needed. 1 Package 2    aspirin 81 mg chewable tablet Take 1 Tab by mouth daily. 90 Tab 4    amLODIPine (NORVASC) 5 mg tablet Take 1 Tab by mouth daily. 60 Tab 1    albuterol (PROVENTIL VENTOLIN) 2.5 mg /3 mL (0.083 %) nebulizer solution 3 mL by Nebulization route every four (4) hours as needed for Wheezing. 1 Package 1    FA/MV-MN/MIN AA JANETT/SAW PAL (SAW PALMETO-MULTIVIT-MIN-AA-FA PO) Take 1 Tab by mouth daily.       vitamin e (E GEMS) 1,000 unit capsule Take 1,000 Units by mouth daily.  Cholecalciferol, Vitamin D3, (VITAMIN D) 2,000 unit Cap Take 1 Cap by mouth daily.  30 Cap 11     Patient Active Problem List   Diagnosis Code    Vitamin D deficiency E55.9    Cough R05    Allergic rhinitis J30.9    Moderate persistent asthma without complication O25.70    Lumbosacral radiculopathy due to degenerative joint disease of spine M47.27    Cervical radiculopathy due to degenerative joint disease of spine M47.22    Elevated PSA R97.20    BPH with obstruction/lower urinary tract symptoms N40.1, N13.8    Family history of prostate cancer Z80.42    Elevated blood pressure [id number]    Hyperlipidemia E61.6    Diastolic dysfunction G27.1    Essential hypertension I10     Past Medical History:   Diagnosis Date    Asthma     Back problem     Burning with urination     Cough     Depression     GERD (gastroesophageal reflux disease)     Poor appetite     Trouble in sleeping     Wheezing      Social History     Social History    Marital status:      Spouse name: N/A    Number of children: N/A    Years of education: N/A     Social History Main Topics    Smoking status: Never Smoker    Smokeless tobacco: Never Used    Alcohol use 3.0 oz/week     6 Cans of beer per week      Comment: occ    Drug use: No    Sexual activity: Yes     Partners: Female     Other Topics Concern    None     Social History Narrative     Family History   Problem Relation Age of Onset    Hypertension Mother     Heart Disease Mother     Arthritis-rheumatoid Mother     Hypertension Father     Heart Attack Father 48    Cancer Brother 72     prostate    Cancer Brother 61     prostate    Colon Cancer Brother     Heart Attack Brother 48    Cancer Brother 66     prostate    Diabetes Brother     Heart Attack Sister 61    Colon Polyps Sister     Heart Attack Brother 54    Heart Attack Sister 72        Review of Systems   Constitutional: Negative for chills and fever. Cardiovascular: Negative for chest pain and palpitations. Gastrointestinal: Negative for constipation, diarrhea, nausea and vomiting. Vitals:    04/02/18 1547   BP: 118/72   Pulse: 71   Resp: 15   Temp: 99.8 °F (37.7 °C)   SpO2: 95%   Weight: 161 lb (73 kg)   Height: 5' 10\" (1.778 m)   PainSc:   6   PainLoc: Generalized       Physical Exam   Constitutional: He is oriented to person, place, and time and well-developed, well-nourished, and in no distress. HENT:   Nose: Nose normal.   Mouth/Throat: Oropharynx is clear and moist.   Eyes: Conjunctivae are normal. Pupils are equal, round, and reactive to light. Neck: Normal range of motion. Neck supple. Cardiovascular: Normal rate, regular rhythm and normal heart sounds. Pulmonary/Chest: Effort normal and breath sounds normal.   Neurological: He is alert and oriented to person, place, and time. Skin: Skin is warm and dry. Nursing note and vitals reviewed. Assessment/Plan      ICD-10-CM ICD-9-CM    1. Cough R05 786.2 AMB POC RAPID INFLUENZA TEST      azithromycin (ZITHROMAX) 250 mg tablet   2. Body aches R52 780.96 AMB POC RAPID INFLUENZA TEST     I have discussed the diagnosis with the patient and the intended plan of care as seen in the above orders. The patient has received an after-visit summary and questions were answered concerning future plans. I have discussed medication, side effects, and warnings with the patient in detail. The patient verbalized understanding and is in agreement with the plan of care. The patient will contact the office with any additional concerns.       Follow-up Disposition:  Return if symptoms worsen or fail to improve.  lab results and schedule of future lab studies reviewed with patient      Bipin Patterson MD

## 2018-04-02 NOTE — MR AVS SNAPSHOT
[address] [place] [address] Road 
[phone] Patient: Tay Cervantes MRN: [id number] QOI:0/1/7250 Visit Information Date & Time Provider Department Dept. Phone Encounter #  
 4/2/2018  3:45 PM Angelito Green MD [facility] [phone] [id number] Follow-up Instructions Return if symptoms worsen or fail to improve. Your Appointments 4/5/2018  9:30 AM  
Office Visit with Gaby Richardson MD  
[facility]) Appt Note: [address] SUB  due 4/2018; switched to Dr. Jesika Guillory [facility], scheduling error [facility] [address] [place] [address] Road  
[phone]  
  
   
 [facility] [address] [place] [address] Upcoming Health Maintenance Date Due ZOSTER VACCINE AGE 60> 6/2/2009 GLAUCOMA SCREENING Q2Y 12/1/2015 MEDICARE YEARLY EXAM 4/5/2018 Pneumococcal 65+ Low/Medium Risk (2 of 2 - PPSV23) 5/8/2018 COLONOSCOPY 6/18/2020 DTaP/Tdap/Td series (2 - Td) 9/9/2020 Allergies as of 4/2/2018  Review Complete On: 4/2/2018 By: Angelito Green MD  
  
 Severity Noted Reaction Type Reactions Latex  04/08/2015    Hives, Itching Cashew Nut  01/07/2016    Other (comments) \"UPSET STOMACH\" Other Medication  01/23/2013    Other (comments) Pt allergic to cats with respiratory, skin reactions. Pt allergic to cashews with upset stomach Vicodin [Hydrocodone-acetaminophen]  05/15/2014    Other (comments)  
 hallucinations Current Immunizations  Never Reviewed Name Date Influenza Vaccine 11/1/2013 Pneumococcal Conjugate (PCV-13) 2/9/2016 Pneumococcal Polysaccharide (PPSV-23) 5/8/2013 TDAP Vaccine 9/9/2010 Not reviewed this visit You Were Diagnosed With   
  
 Codes Comments Cough    -  Primary ICD-10-CM: X71 ICD-9-CM: 786.2  Body aches     ICD-10-CM: R52 
 ICD-9-CM: 780.96 Vitals BP Pulse Temp Resp Height(growth percentile) Weight(growth percentile) 118/72 71 99.8 °F (37.7 °C) 15 5' 10\" (1.778 m) 161 lb (73 kg) SpO2 BMI Smoking Status 95% 23.1 kg/m2 Never Smoker Vitals History BMI and BSA Data Body Mass Index Body Surface Area  
 23.1 kg/m 2 1.9 m 2 Preferred Pharmacy Pharmacy Name Phone [facility], [address] [place] [address] Rd [phone] Your Updated Medication List  
  
   
This list is accurate as of 4/2/18  4:00 PM.  Always use your most recent med list.  
  
  
  
  
 albuterol 2.5 mg /3 mL (0.083 %) nebulizer solution Commonly known as:  PROVENTIL VENTOLIN  
3 mL by Nebulization route every four (4) hours as needed for Wheezing. amLODIPine 5 mg tablet Commonly known as:  Job Dub Take 1 Tab by mouth daily. aspirin 81 mg chewable tablet Take 1 Tab by mouth daily. atorvastatin 20 mg tablet Commonly known as:  LIPITOR Take 1 Tab by mouth daily. azithromycin 250 mg tablet Commonly known as:  Brendolyn Lalita Take 2 tablets today, then take 1 tablet daily  
  
 cetirizine-psuedoePHEDrine 5-120 mg per tablet Commonly known as:  ZyrTEC-D Take 1 Tab by mouth daily. Cholecalciferol (Vitamin D3) 2,000 unit Cap capsule Commonly known as:  VITAMIN D Take 1 Cap by mouth daily. fluticasone-salmeterol 500-50 mcg/dose diskus inhaler Commonly known as:  ADVAIR Take 1 Puff by inhalation every twelve (12) hours. levalbuterol 1.25 mg/3 mL Nebu Commonly known as:  XOPENEX  
3 mL by Nebulization route every six (6) hours as needed. levalbuterol tartrate 45 mcg/actuation inhaler Commonly known as:  Epifanio Early Take 2 Puffs by inhalation every four (4) hours as needed for Wheezing.  
  
 montelukast 10 mg tablet Commonly known as:  SINGULAIR Take 1 Tab by mouth daily. Omeprazole delayed release 20 mg tablet Commonly known as:  PRILOSEC D/R Take 1 Tab by mouth daily. SAW PALMETO-MULTIVIT-MIN-AA-FA PO Take 1 Tab by mouth daily. tamsulosin 0.4 mg capsule Commonly known as:  FLOMAX Take 1 Cap by mouth daily. vitamin e 1,000 unit capsule Commonly known as:  E GEMS Take 1,000 Units by mouth daily. XOLAIR 150 mg Solr Generic drug:  omalizumab  
by SubCUTAneous route once. Prescriptions Sent to Pharmacy Refills  
 azithromycin (ZITHROMAX) 250 mg tablet 0 Sig: Take 2 tablets today, then take 1 tablet daily Class: Normal  
 Pharmacy: [address] [place] [address], [address] [place] [address] #: [phone] We Performed the Following AMB POC RAPID INFLUENZA TEST [78427 CPT(R)] Follow-up Instructions Return if symptoms worsen or fail to improve. Patient Instructions Cough: Care Instructions Your Care Instructions A cough is your body's response to something that bothers your throat or airways. Many things can cause a cough. You might cough because of a cold or the flu, bronchitis, or asthma. Smoking, postnasal drip, allergies, and stomach acid that backs up into your throat also can cause coughs. A cough is a symptom, not a disease. Most coughs stop when the cause, such as a cold, goes away. You can take a few steps at home to cough less and feel better. Follow-up care is a key part of your treatment and safety. Be sure to make and go to all appointments, and call your doctor if you are having problems. It's also a good idea to know your test results and keep a list of the medicines you take. How can you care for yourself at home? · Drink lots of water and other fluids. This helps thin the mucus and soothes a dry or sore throat. Honey or lemon juice in hot water or tea may ease a dry cough. · Take cough medicine as directed by your doctor. · Prop up your head on pillows to help you breathe and ease a dry cough. · Try cough drops to soothe a dry or sore throat. Cough drops don't stop a cough. Medicine-flavored cough drops are no better than candy-flavored drops or hard candy. · Do not smoke. Avoid secondhand smoke. If you need help quitting, talk to your doctor about stop-smoking programs and medicines. These can increase your chances of quitting for good. When should you call for help? Call 911 anytime you think you may need emergency care. For example, call if: 
? · You have severe trouble breathing. ?Call your doctor now or seek immediate medical care if: 
? · You cough up blood. ? · You have new or worse trouble breathing. ? · You have a new or higher fever. ? · You have a new rash. ? Watch closely for changes in your health, and be sure to contact your doctor if: 
? · You cough more deeply or more often, especially if you notice more mucus or a change in the color of your mucus. ? · You have new symptoms, such as a sore throat, an earache, or sinus pain. ? · You do not get better as expected. Where can you learn more? Go to http://fredis-haja.info/. Enter D279 in the search box to learn more about \"Cough: Care Instructions. \" Current as of: May 12, 2017 Content Version: 11.4 © 9669-0127 Genoom. Care instructions adapted under license by Met[employer] (which disclaims liability or warranty for this information). If you have questions about a medical condition or this instruction, always ask your healthcare professional. [address] any warranty or liability for your use of this information. Introducing [facility] & HEALTH SERVICES! Dear Shelia Martino: Thank you for requesting a [employer] account. Our records indicate that you already have an active [employer] account. You can access your account anytime at [email or web address] Did you know that you can access your hospital and ER discharge instructions at any time in Gogii Games? You can also review all of your test results from your hospital stay or ER visit. Additional Information If you have questions, please visit the Frequently Asked Questions section of the Gogii Games website at [email or web address] Remember, G[employer]ii Games is NOT to be used for urgent needs. For medical emergencies, dial 911. Now available from your iPhone and Android! Please provide this summary of care documentation to your next provider. Your primary care clinician is listed as Nico Reddy. If you have any questions after today's visit, please call [phone].

## 2018-04-05 ENCOUNTER — HOSPITAL ENCOUNTER (OUTPATIENT)
Dept: LAB | Age: 68
Discharge: HOME | End: 2018-04-05
Payer: MEDICARE

## 2018-04-05 ENCOUNTER — OFFICE VISIT (OUTPATIENT)
Dept: FAMILY MEDICINE CLINIC | Age: 68
End: 2018-04-05

## 2018-04-05 VITALS
TEMPERATURE: 97.8 F | HEART RATE: 70 BPM | RESPIRATION RATE: 12 BRPM | WEIGHT: 158.5 LBS | DIASTOLIC BLOOD PRESSURE: 76 MMHG | OXYGEN SATURATION: 98 % | HEIGHT: 70 IN | BODY MASS INDEX: 22.69 KG/M2 | SYSTOLIC BLOOD PRESSURE: 118 MMHG

## 2018-04-05 DIAGNOSIS — Z13.5 SCREENING FOR GLAUCOMA: ICD-10-CM

## 2018-04-05 DIAGNOSIS — Z12.5 SPECIAL SCREENING FOR MALIGNANT NEOPLASM OF PROSTATE: ICD-10-CM

## 2018-04-05 DIAGNOSIS — Z00.00 MEDICARE ANNUAL WELLNESS VISIT, SUBSEQUENT: Primary | ICD-10-CM

## 2018-04-05 LAB — PSA SERPL-MCNC: 2.3 NG/ML (ref 0–4)

## 2018-04-05 PROCEDURE — 84153 ASSAY OF PSA TOTAL: CPT | Performed by: FAMILY MEDICINE

## 2018-04-05 NOTE — PROGRESS NOTES
Patient came into the office for medicare wellness visit. 1. Have you been to the ER, urgent care clinic since your last visit? Hospitalized since your last visit? No    2. Have you seen or consulted any other health care providers outside of the [facility] since your last visit? Include any pap smears or colon screening. No      This is the Subsequent Medicare Annual Wellness Exam, performed 12 months or more after the Initial AWV or the last Subsequent AWV    I have reviewed the patient's medical history in detail and updated the computerized patient record. History   Urinary issues improved. No concerns. Past Medical History:   Diagnosis Date    Asthma     Back problem     Burning with urination     Cough     Depression     GERD (gastroesophageal reflux disease)     Poor appetite     Trouble in sleeping     Wheezing       Past Surgical History:   Procedure Laterality Date    HX ORCHIECTOMY  1999    left, varicocele with complications     Current Outpatient Prescriptions   Medication Sig Dispense Refill    azithromycin (ZITHROMAX) 250 mg tablet Take 2 tablets today, then take 1 tablet daily 6 Tab 0    omalizumab (XOLAIR) 150 mg solr by SubCUTAneous route once.  tamsulosin (FLOMAX) 0.4 mg capsule Take 1 Cap by mouth daily. 30 Cap 1    montelukast (SINGULAIR) 10 mg tablet Take 1 Tab by mouth daily. 90 Tab 4    cetirizine-psuedoePHEDrine (ZYRTEC-D) 5-120 mg per tablet Take 1 Tab by mouth daily. 90 Tab 4    Omeprazole delayed release (PRILOSEC D/R) 20 mg tablet Take 1 Tab by mouth daily. 90 Tab 4    fluticasone-salmeterol (ADVAIR) 500-50 mcg/dose diskus inhaler Take 1 Puff by inhalation every twelve (12) hours. 3 Each 1    levalbuterol tartrate (XOPENEX) 45 mcg/actuation inhaler Take 2 Puffs by inhalation every four (4) hours as needed for Wheezing. 1 Inhaler 4    levalbuterol (XOPENEX) 1.25 mg/3 mL nebu 3 mL by Nebulization route every six (6) hours as needed.  1 Package 2    aspirin 81 mg chewable tablet Take 1 Tab by mouth daily. 90 Tab 4    albuterol (PROVENTIL VENTOLIN) 2.5 mg /3 mL (0.083 %) nebulizer solution 3 mL by Nebulization route every four (4) hours as needed for Wheezing. 1 Package 1    FA/MV-MN/MIN AA JANETT/SAW PAL (SAW PALMETO-MULTIVIT-MIN-AA-FA PO) Take 1 Tab by mouth daily.  Cholecalciferol, Vitamin D3, (VITAMIN D) 2,000 unit Cap Take 1 Cap by mouth daily. 30 Cap 11    atorvastatin (LIPITOR) 20 mg tablet Take 1 Tab by mouth daily. 90 Tab 4    amLODIPine (NORVASC) 5 mg tablet Take 1 Tab by mouth daily. 60 Tab 1    vitamin e (E GEMS) 1,000 unit capsule Take 1,000 Units by mouth daily. Allergies   Allergen Reactions    Latex Hives and Itching    Cashew Nut Other (comments)     \"UPSET STOMACH\"    Other Medication Other (comments)     Pt allergic to cats with respiratory, skin reactions.   Pt allergic to cashews with upset stomach    Vicodin [Hydrocodone-Acetaminophen] Other (comments)     hallucinations     Family History   Problem Relation Age of Onset    Hypertension Mother     Heart Disease Mother    [address] [facility] Arthritis-rheumatoid Mother     Hypertension Father     Heart Attack Father 48    Cancer Brother 72     prostate    Cancer Brother 61     prostate    Colon Cancer Brother     Heart Attack Brother 48    Cancer Brother 66     prostate    Diabetes Brother     Heart Attack Sister 61    Colon Polyps Sister     Heart Attack Brother 54    Heart Attack Sister 72     Social History   Substance Use Topics    Smoking status: Never Smoker    Smokeless tobacco: Never Used    Alcohol use 3.0 oz/week     6 Cans of beer per week      Comment: occ     Patient Active Problem List   Diagnosis Code    Vitamin D deficiency E55.9    Cough R05    Allergic rhinitis J30.9    Moderate persistent asthma without complication Q14.82    Lumbosacral radiculopathy due to degenerative joint disease of spine M47.27    Cervical radiculopathy due to degenerative joint disease of spine M47.22    Elevated PSA R97.20    BPH with obstruction/lower urinary tract symptoms N40.1, N13.8    Family history of prostate cancer Z80.42    Elevated blood pressure [id number]    Hyperlipidemia F31.3    Diastolic dysfunction U37.8    Essential hypertension I10       Depression Risk Factor Screening:     PHQ over the last two weeks 4/5/2018   Little interest or pleasure in doing things Not at all   Feeling down, depressed or hopeless Not at all   Total Score PHQ 2 0   Trouble falling or staying asleep, or sleeping too much -   Feeling tired or having little energy -   Poor appetite or overeating -   Feeling bad about yourself - or that you are a failure or have let yourself or your family down -   Trouble concentrating on things such as school, work, reading or watching TV -   Moving or speaking so slowly that other people could have noticed; or the opposite being so fidgety that others notice -   Thoughts of being better off dead, or hurting yourself in some way -   PHQ 9 Score -   How difficult have these problems made it for you to do your work, take care of your home and get along with others -     Alcohol Risk Factor Screening: You do not drink alcohol or very rarely. Functional Ability and Level of Safety:   Hearing Loss  Hearing is good. Activities of Daily Living  The home contains: no safety equipment. Patient does total self care    Fall Risk  Fall Risk Assessment, last 12 mths 4/5/2018   Able to walk? Yes   Fall in past 12 months?  No       Abuse Screen  Patient is not abused    Cognitive Screening   Evaluation of Cognitive Function:  Has your family/caregiver stated any concerns about your memory: no  Normal    Patient Care Team   Patient Care Team:  Sandy Burrell MD as PCP - General (Family Practice)  Sandy Burrell MD (Family Practice)  Alex Alexander MD (Pulmonary Disease)  Margret Meyers MD (Allergy)  Dilshad Winston MD (Gastroenterology)  Kendra Castellanos MD (Ophthalmology)    Assessment/Plan   Education and counseling provided:  Are appropriate based on today's review and evaluation      ICD-10-CM ICD-9-CM    1. Medicare annual wellness visit, subsequent Z00.00 V70.0    2. Screening for glaucoma Z13.5 V80.1 REFERRAL TO OPHTHALMOLOGY      CANCELED: REFERRAL TO OPTOMETRY   3. Special screening for malignant neoplasm of prostate Z12.5 V76.44 PSA SCREENING (SCREENING)     Provide copies of ACP documents  Will review colo plan when available (5 vs 10? )  Pnuemovax later this year    Health Maintenance Due   Topic Date Due    ZOSTER VACCINE AGE 60>  06/02/2009    GLAUCOMA SCREENING Q2Y  12/01/2015    MEDICARE YEARLY EXAM  04/05/2018         The patient understands our medical plan. Alternatives have been explained and offered. All questions have been addressed. He is encouraged to employ the information provided in the after visit summary, which was reviewed. He is instructed to call the clinic if he has not been notified either by phone or through [address] with the results of his tests or with an appointment plan for any referrals within 1 week(s). No news is not good news; it's no news. The patient  is to call if his condition worsens or fails to improve or if significant side effects are experienced.      [address] 1 year

## 2018-04-05 NOTE — PATIENT INSTRUCTIONS
Medicare Wellness Visit, Male    The best way to improve and maintain good health is to have a healthy lifestyle by eating a well-balanced diet, exercising regularly, limiting alcohol and stopping smoking. Regular visits with your physician or non-physician health care provider also support your good health. Preventive screening tests can find health problems before they become diseases or illnesses. Here is a list of your current Health Maintenance items with a due date:  Health Maintenance   Topic Date Due    ZOSTER VACCINE AGE 60>  06/02/2009    GLAUCOMA SCREENING Q2Y  12/01/2015    MEDICARE YEARLY EXAM  04/05/2018    Pneumococcal 65+ Low/Medium Risk (2 of 2 - PPSV23) 05/08/2018    COLONOSCOPY  06/18/2020    DTaP/Tdap/Td series (2 - Td) 09/09/2020    Hepatitis C Screening  Completed    Influenza Age 5 to Adult  Addressed       Preventive services such as immunizations prevent serious infections. All people over age 72 should have a Pneumovax and a Prevnar-13 shot to prevent potentially life threatening infections with the pneumococcus bacteria, a common cause of pneumonia. These are once in a lifetime unless you and your provider decide differently. All people over 65 should have a yearly influenza vaccine or \"flu\" shot. This does not prevent infection with cold viruses but has been proven to prevent hospitalization and death from influenza. Although Medicare part B \"regular Medicare\" currently only covers tetanus vaccination in the context of an injury, a tetanus vaccine (Tdap or Td) is recommended every 10 years. A shingles vaccine is recommended once in a lifetime after age 48. The Shingles vaccine is also not covered by Medicare part B. Note, however, that both the Shingles vaccine and Tdap/Td are generally covered by secondary carriers. Please check your coverage and out of pocket expenses.  Consider contacting your local health department because it may stock these vaccines for a reasonable charge. Call your insurance company to verify their coverage of Shingrix (the new shingles vaccine). How much will they expect you to pay? Will they pay at the office or only at the pharmacy? We currently have documentation of the following immunization history for you:  Immunization History   Administered Date(s) Administered    Influenza Vaccine 11/01/2013    Pneumococcal Conjugate (PCV-13) 02/09/2016    Pneumococcal Polysaccharide (PPSV-23) 05/08/2013    TDAP Vaccine 09/09/2010       Screening for infection with Hepatitis C is recommended for anyone born between [birth year] through 1965. The table at the bottom of this document indicates the status of this and other screening services. Screening for diabetes mellitus with a blood sugar test (glucose) should be done at least every 3 years until age 79. You and your health care provider may decide whether to continue screening after age 79. The most recent blood glucose we have on file for you is:   Lab Results   Component Value Date/Time    Glucose 95 02/29/2016 04:43 AM       Glaucoma is a disease of the eye due to increased ocular pressure that can lead to blindness. People with risk factors for glaucoma ( race, diabetes, family history) should consider screening at least every 2 years by an eye professional. This may be covered annually if indicated as determined by you and your doctor. Cardiovascular screening tests that check for elevated lipids or cholesterol (fatty part of blood) which can lead to heart disease and strokes should be done every 4-6 years through age 79. You and your health care provider may decide whether to continue screening after age 79.  The most recent lipid panel we have on file for you is:   Lab Results   Component Value Date/Time    Cholesterol, total 240 (H) 02/29/2016 04:43 AM    HDL Cholesterol 81 (H) 02/29/2016 04:43 AM    LDL, calculated 141.4 (H) 02/29/2016 04:43 AM    VLDL, calculated 17.6 02/29/2016 04:43 AM    Triglyceride 88 02/29/2016 04:43 AM    CHOL/HDL Ratio 3.0 02/29/2016 04:43 AM       Colorectal cancer screening that evaluates for blood or polyps in your colon for people with average risk should be done yearly as a stool test, every five years as a flexible sigmoidoscope or every 10 years as a colonoscopy up to age 76. You and your health care provider may decide whether to continue screening after age 76. Men up to age 76 may elect to screen for prostate cancer with a blood test called a PSA at certain intervals, depending on their personal and family history. This decision is between the patient and his provider. The most recent PSA values we have on file for you are:  Lab Results   Component Value Date/Time    Prostate Specific Ag 1.9 04/04/2017 10:47 AM    Prostate Specific Ag 2.32 02/04/2015 02:25 PM    Prostate Specific Ag 3.1 01/08/2015 02:08 PM    Prostate Specific Ag 1.8 10/18/2012 01:30 PM    Prostate Specific Ag 3.5 04/21/2010 08:36 PM    Prostate Specific Ag 0.9 10/26/2009 09:30 AM       If you have been a smoker or had family history of abdominal aortic aneurysms, you and your provider may decide to schedule an ultrasound test of your aorta. Our records show this was done on: Patient never smoker. People who have smoked the equivalent of 1 pack per day for 30 years or more may benefit from screening for lung cancer with a yearly low dose CT scan until they have been non smokers for 15 years or competing health conditions render this unlikely to be beneficial. Our records show: Patient never smoker no CT scan ordered. Your Medicare Wellness Exam is recommended annually. Prostate Cancer Screening: Care Instructions  Your Care Instructions    The prostate gland is an organ found just below a man's bladder. It is the size and shape of a walnut. It surrounds the tube that carries urine from the bladder out of the body through the penis.  This tube is called the urethra. Prostate cancer is the abnormal growth of cells in the prostate. It is the second most common type of cancer in men. (Skin cancer is the most common.)  Most cases of prostate cancer occur in men older than 72. The disease runs in families. And it's more common in -American men. When it's found and treated early, prostate cancer may be cured. But it is not always treated. This is because prostate cancer may not shorten your life, especially if you are older and the cancer is growing slowly. Follow-up care is a key part of your treatment and safety. Be sure to make and go to all appointments, and call your doctor if you are having problems. It's also a good idea to know your test results and keep a list of the medicines you take. What are the screening tests for prostate cancer? The main screening test for prostate cancer is the prostate-specific antigen (PSA) test. This is a blood test that measures how much PSA is in your blood. A high level may mean that you have an enlargement, an infection, or cancer. Along with the PSA test, you may have a digital rectal exam. The digital (finger) rectal exam checks for anything abnormal in your prostate. To do the exam, the doctor puts a lubricated, gloved finger into your rectum. If these tests suggest cancer, you may need a prostate biopsy. How is prostate cancer diagnosed? In a biopsy, the doctor takes small tissue samples from your prostate gland. Another doctor then looks at the tissue under a microscope to see if there are cancer cells, signs of infection, or other problems. The results help diagnose prostate cancer. What are the pros and cons of screening? Neither a PSA test nor a digital rectal exam can tell you for sure that you do or do not have cancer. But they can help you decide if you need more tests, such as a prostate biopsy. Screening tests may be useful because most men with prostate cancer don't have symptoms.  It can be hard to know if you have cancer until it is more advanced. And then it's harder to treat. But having a PSA test can also cause harm. The test may show high levels of PSA that aren't caused by cancer. So you could have a prostate biopsy you didn't need. Or the PSA test might be normal when there is cancer, so a cancer might not be found early. The test can also find cancers that would never have caused a problem during your lifetime. So you might have treatment that was not needed. Prostate cancer usually develops late in life and grows slowly. For many men, it does not shorten their lives. Some experts advise screening only for men who are at high risk. Talk with your doctor to see if screening is right for you. Where can you learn more? Go to http://fredis-haja.info/. Enter R550 in the search box to learn more about \"Prostate Cancer Screening: Care Instructions. \"  Current as of: May 12, 2017  Content Version: 11.4  © 6311-8836 [employer]. Care instructions adapted under license by [employer] (which disclaims liability or warranty for this information). If you have questions about a medical condition or this instruction, always ask your healthcare professional. [address] any warranty or liability for your use of this information. Advance Care Planning: Care Instructions  Your Care Instructions  It can be hard to live with an illness that cannot be cured. But if your health is getting worse, you may want to make decisions about end-of-life care. Planning for the end of your life does not mean that you are giving up. It is a way to make sure that your wishes are met. Clearly stating your wishes can make it easier for your loved ones. Making plans while you are still able may also ease your mind and make your final days less stressful and more meaningful. Follow-up care is a key part of your treatment and safety.  Be sure to make and go to all appointments, and call your doctor if you are having problems. It's also a good idea to know your test results and keep a list of the medicines you take. What can you do to plan for the end of life? · You can bring these issues up with your doctor. You do not need to wait until your doctor starts the conversation. You might start with \"I would not be willing to live with . Cathi Jairo Cathi [place] Cathi Jairo \" When you complete this sentence it helps your doctor understand your wishes. · Talk openly and honestly with your doctor. This is the best way to understand the decisions you will need to make as your health changes. Know that you can always change your mind. · Ask your doctor about commonly used life-support measures. These include tube feedings, breathing machines, and fluids given through a vein (IV). Understanding these treatments will help you decide whether you want them. · You may choose to have these life-supporting treatments for a limited time. This allows a trial period to see whether they will help you. You may also decide that you want your doctor to take only certain measures to keep you alive. It is important to spell out these conditions so that your doctor and family understand them. · Talk to your doctor about how long you are likely to live. He or she may be able to give you an idea of what usually happens with your specific illness. · Think about preparing papers that state your wishes. This way there will not be any confusion about what you want. You can change your instructions at any time. Which papers should you prepare? Advance directives are legal papers that tell doctors how you want to be cared for at the end of your life. You do not need a  to write these papers. Ask your doctor or your state health department for information on how to write your advance directives. They may have the forms for each of these types of papers.  Make sure your doctor has a copy of these on file, and give a copy to a family member or close friend. · Consider a do-not-resuscitate order (DNR). This order asks that no extra treatments be done if your heart stops or you stop breathing. Extra treatments may include cardiopulmonary resuscitation (CPR), electrical shock to restart your heart, or a machine to breathe for you. If you decide to have a DNR order, ask your doctor to explain and write it. Place the order in your home where everyone can easily see it. · Consider a living will. A living will explains your wishes about life support and other treatments at the end of your life if you become unable to speak for yourself. Living ferraro tell doctors to use or not use treatments that would keep you alive. You must have one or two witnesses or a notary present when you sign this form. · Consider a durable power of  for health care. This allows you to name a person to make decisions about your care if you are not able to. Most people ask a close friend or family member. Talk to this person about the kinds of treatments you want and those that you do not want. Make sure this person understands your wishes. These legal papers are simple to change. Tell your doctor what you want to change, and ask him or her to make a note in your medical file. Give your family updated copies of the papers. Where can you learn more? Go to http://fredis-haja.info/. Enter P184 in the search box to learn more about \"Advance Care Planning: Care Instructions. \"  Current as of: November 17, 2016  Content Version: 11.3  © 6594-8767 [employer]. Care instructions adapted under license by [employer] (which disclaims liability or warranty for this information). If you have questions about a medical condition or this instruction, always ask your healthcare professional. [address] any warranty or liability for your use of this information.

## 2018-04-05 NOTE — MR AVS SNAPSHOT
[facility] [address] [place] [id number] Patient: Patrick Wallis MRN: [id number] EIN:[id number] Visit Information Date & Time Provider Department Dept. Phone Encounter #  
 4/5/2018  9:30 AM Lazara Rock MD [facility] [phone] [id number] Upcoming Health Maintenance Date Due ZOSTER VACCINE AGE 60> 6/2/2009 GLAUCOMA SCREENING Q2Y 12/1/2015 MEDICARE YEARLY EXAM 4/5/2018 Pneumococcal 65+ Low/Medium Risk (2 of 2 - PPSV23) 5/8/2018 COLONOSCOPY 6/18/2020 DTaP/Tdap/Td series (2 - Td) 9/9/2020 Allergies as of 4/5/2018  Review Complete On: 4/5/2018 By: Lazara Rock MD  
  
 Severity Noted Reaction Type Reactions Latex  04/08/2015    Hives, Itching Cashew Nut  01/07/2016    Other (comments) \"UPSET STOMACH\" Other Medication  01/23/2013    Other (comments) Pt allergic to cats with respiratory, skin reactions. Pt allergic to cashews with upset stomach Vicodin [Hydrocodone-acetaminophen]  05/15/2014    Other (comments)  
 hallucinations Current Immunizations  Never Reviewed Name Date Influenza Vaccine 11/1/2013 Pneumococcal Conjugate (PCV-13) 2/9/2016 Pneumococcal Polysaccharide (PPSV-23) 5/8/2013 TDAP Vaccine 9/9/2010 Not reviewed this visit You Were Diagnosed With   
  
 Codes Comments Medicare annual wellness visit, subsequent    -  Primary ICD-10-CM: Z00.00 ICD-9-CM: V70.0 Screening for glaucoma     ICD-10-CM: Z13.5 ICD-9-CM: V80.1 Special screening for malignant neoplasm of prostate     ICD-10-CM: Z12.5 ICD-9-CM: V76.44 Vitals BP Pulse Temp Resp Height(growth percentile) Weight(growth percentile) 118/76 (BP 1 Location: Left arm, BP Patient Position: Sitting) 70 97.8 °F (36.6 °C) (Oral) 12 5' 10\" (1.778 m) 158 lb 8 oz (71.9 kg) SpO2 BMI Smoking Status 98% 22.74 kg/m2 Never Smoker BMI and BSA Data Body Mass Index Body Surface Area 22.74 kg/m 2 1.88 m 2 Preferred Pharmacy Pharmacy Name Phone [facility] [address] [place] [address], [address] [place] [address] Rd [phone] Your Updated Medication List  
  
   
This list is accurate as of 4/5/18 10:18 AM.  Always use your most recent med list.  
  
  
  
  
 albuterol 2.5 mg /3 mL (0.083 %) nebulizer solution Commonly known as:  PROVENTIL VENTOLIN  
3 mL by Nebulization route every four (4) hours as needed for Wheezing. amLODIPine 5 mg tablet Commonly known as:  Kar Isidoro Take 1 Tab by mouth daily. aspirin 81 mg chewable tablet Take 1 Tab by mouth daily. atorvastatin 20 mg tablet Commonly known as:  LIPITOR Take 1 Tab by mouth daily. azithromycin 250 mg tablet Commonly known as:  Daniel Hire Take 2 tablets today, then take 1 tablet daily  
  
 cetirizine-psuedoePHEDrine 5-120 mg per tablet Commonly known as:  ZyrTEC-D Take 1 Tab by mouth daily. Cholecalciferol (Vitamin D3) 2,000 unit Cap capsule Commonly known as:  VITAMIN D Take 1 Cap by mouth daily. fluticasone-salmeterol 500-50 mcg/dose diskus inhaler Commonly known as:  ADVAIR Take 1 Puff by inhalation every twelve (12) hours. levalbuterol 1.25 mg/3 mL Nebu Commonly known as:  XOPENEX  
3 mL by Nebulization route every six (6) hours as needed. levalbuterol tartrate 45 mcg/actuation inhaler Commonly known as:  Geroldine Zamudio Take 2 Puffs by inhalation every four (4) hours as needed for Wheezing.  
  
 montelukast 10 mg tablet Commonly known as:  SINGULAIR Take 1 Tab by mouth daily. Omeprazole delayed release 20 mg tablet Commonly known as:  PRILOSEC D/R Take 1 Tab by mouth daily. SAW PALMETO-MULTIVIT-MIN-AA-FA PO Take 1 Tab by mouth daily. tamsulosin 0.4 mg capsule Commonly known as:  FLOMAX Take 1 Cap by mouth daily. XOLAIR 150 mg Solr Generic drug:  omalizumab  
by SubCUTAneous route once. We Performed the Following REFERRAL TO OPHTHALMOLOGY [REF57 Custom] Comments:  
 Screen glaucoma To-Do List   
 04/05/2018 Lab:  PSA SCREENING (SCREENING) Referral Information Referral ID Referred By Referred To  
  
 [id number] Singh MATHIS MD   
   [address] Phone: [phone] Fax: [phone] Visits Status Start Date End Date 1 New Request 4/5/18 4/5/19 If your referral has a status of pending review or denied, additional information will be sent to support the outcome of this decision. Patient Instructions Medicare Wellness Visit, Male The best way to improve and maintain good health is to have a healthy lifestyle by eating a well-balanced diet, exercising regularly, limiting alcohol and stopping smoking. Regular visits with your physician or non-physician health care provider also support your good health. Preventive screening tests can find health problems before they become diseases or illnesses. Here is a list of your current Health Maintenance items with a due date: 
Health Maintenance Topic Date Due  
 ZOSTER VACCINE AGE 60>  06/02/2009  GLAUCOMA SCREENING Q2Y  12/01/2015  MEDICARE YEARLY EXAM  04/05/2018  Pneumococcal 65+ Low/Medium Risk (2 of 2 - PPSV23) 05/08/2018  COLONOSCOPY  06/18/2020  
 DTaP/Tdap/Td series (2 - Td) 09/09/2020  Hepatitis C Screening  Completed  Influenza Age 5 to Adult  Addressed Preventive services such as immunizations prevent serious infections. All people over age 72 should have a Pneumovax and a Prevnar-13 shot to prevent potentially life threatening infections with the pneumococcus bacteria, a common cause of pneumonia. These are once in a lifetime unless you and your provider decide differently. All people over 65 should have a yearly influenza vaccine or \"flu\" shot. This does not prevent infection with cold viruses but has been proven to prevent hospitalization and death from influenza. Although Medicare part B \"regular Medicare\" currently only covers tetanus vaccination in the context of an injury, a tetanus vaccine (Tdap or Td) is recommended every 10 years. A shingles vaccine is recommended once in a lifetime after age 48. The Shingles vaccine is also not covered by Medicare part B. Note, however, that both the Shingles vaccine and Tdap/Td are generally covered by secondary carriers. Please check your coverage and out of pocket expenses. Consider contacting your local health department because it may stock these vaccines for a reasonable charge. Call your insurance company to verify their coverage of Shingrix (the new shingles vaccine). How much will they expect you to pay? Will they pay at the office or only at the pharmacy? We currently have documentation of the following immunization history for you: 
Immunization History Administered Date(s) Administered  Influenza Vaccine 11/01/2013  Pneumococcal Conjugate (PCV-13) 02/09/2016  Pneumococcal Polysaccharide (PPSV-23) 05/08/2013  TDAP Vaccine 09/09/2010 Screening for infection with Hepatitis C is recommended for anyone born between [birth year] through [address]. The table at the bottom of this document indicates the status of this and other screening services. Screening for diabetes mellitus with a blood sugar test (glucose) should be done at least every 3 years until age 79. You and your health care provider may decide whether to continue screening after age 79. The most recent blood glucose we have on file for you is:  
Lab Results Component Value Date/Time  Glucose 95 02/29/2016 04:43 AM  
 
 
Glaucoma is a disease of the eye due to increased ocular pressure that can lead to blindness. People with risk factors for glaucoma ( race, diabetes, family history) should consider screening at least every 2 years by an eye professional. This may be covered annually if indicated as determined by you and your doctor. Cardiovascular screening tests that check for elevated lipids or cholesterol (fatty part of blood) which can lead to heart disease and strokes should be done every 4-6 years through age 79. You and your health care provider may decide whether to continue screening after age 79. The most recent lipid panel we have on file for you is:  
Lab Results Component Value Date/Time Cholesterol, total 240 (H) 02/29/2016 04:43 AM  
 HDL Cholesterol 81 (H) 02/29/2016 04:43 AM  
 LDL, calculated 141.4 (H) 02/29/2016 04:43 AM  
 VLDL, calculated 17.6 02/29/2016 04:43 AM  
 Triglyceride 88 02/29/2016 04:43 AM  
 CHOL/HDL Ratio 3.0 02/29/2016 04:43 AM  
 
 
Colorectal cancer screening that evaluates for blood or polyps in your colon for people with average risk should be done yearly as a stool test, every five years as a flexible sigmoidoscope or every 10 years as a colonoscopy up to age 76. You and your health care provider may decide whether to continue screening after age 76. Men up to age 76 may elect to screen for prostate cancer with a blood test called a PSA at certain intervals, depending on their personal and family history. This decision is between the patient and his provider. The most recent PSA values we have on file for you are: 
Lab Results Component Value Date/Time  
 Prostate Specific Ag 1.9 04/04/2017 10:47 AM  
 Prostate Specific Ag 2.32 02/04/2015 02:25 PM  
 Prostate Specific Ag 3.1 01/08/2015 02:08 PM  
 Prostate Specific Ag 1.8 10/18/2012 01:30 PM  
 Prostate Specific Ag 3.5 04/21/2010 08:36 PM  
 Prostate Specific Ag 0.9 10/26/2009 09:30 AM  
 
 
If you have been a smoker or had family history of abdominal aortic aneurysms, you and your provider may decide to schedule an ultrasound test of your aorta. Our records show this was done on: Patient never smoker. People who have smoked the equivalent of 1 pack per day for 30 years or more may benefit from screening for lung cancer with a yearly low dose CT scan until they have been non smokers for 15 years or competing health conditions render this unlikely to be beneficial. Our records show: Patient never smoker no CT scan ordered. Your Medicare Wellness Exam is recommended annually. Prostate Cancer Screening: Care Instructions Your Care Instructions The prostate gland is an organ found just below a man's bladder. It is the size and shape of a walnut. It surrounds the tube that carries urine from the bladder out of the body through the penis. This tube is called the urethra. Prostate cancer is the abnormal growth of cells in the prostate. It is the second most common type of cancer in men. (Skin cancer is the most common.) Most cases of prostate cancer occur in men older than 72. The disease runs in families. And it's more common in -American men. When it's found and treated early, prostate cancer may be cured. But it is not always treated. This is because prostate cancer may not shorten your life, especially if you are older and the cancer is growing slowly. Follow-up care is a key part of your treatment and safety. Be sure to make and go to all appointments, and call your doctor if you are having problems. It's also a good idea to know your test results and keep a list of the medicines you take. What are the screening tests for prostate cancer? The main screening test for prostate cancer is the prostate-specific antigen (PSA) test. This is a blood test that measures how much PSA is in your blood. A high level may mean that you have an enlargement, an infection, or cancer. Along with the PSA test, you may have a digital rectal exam. The digital (finger) rectal exam checks for anything abnormal in your prostate. To do the exam, the doctor puts a lubricated, gloved finger into your rectum. If these tests suggest cancer, you may need a prostate biopsy. How is prostate cancer diagnosed? In a biopsy, the doctor takes small tissue samples from your prostate gland. Another doctor then looks at the tissue under a microscope to see if there are cancer cells, signs of infection, or other problems. The results help diagnose prostate cancer. What are the pros and cons of screening? Neither a PSA test nor a digital rectal exam can tell you for sure that you do or do not have cancer. But they can help you decide if you need more tests, such as a prostate biopsy. Screening tests may be useful because most men with prostate cancer don't have symptoms. It can be hard to know if you have cancer until it is more advanced. And then it's harder to treat. But having a PSA test can also cause harm. The test may show high levels of PSA that aren't caused by cancer. So you could have a prostate biopsy you didn't need. Or the PSA test might be normal when there is cancer, so a cancer might not be found early. The test can also find cancers that would never have caused a problem during your lifetime. So you might have treatment that was not needed. Prostate cancer usually develops late in life and grows slowly. For many men, it does not shorten their lives. Some experts advise screening only for men who are at high risk. Talk with your doctor to see if screening is right for you. Where can you learn more? Go to http://fredis-haja.info/. Enter R550 in the search box to learn more about \"Prostate Cancer Screening: Care Instructions. \" Current as of: May 12, 2017 Content Version: 11.4 © 1928-5938 [employer].  Care instructions adapted under license by [address] (which disclaims liability or warranty for this information). If you have questions about a medical condition or this instruction, always ask your healthcare professional. [address] any warranty or liability for your use of this information. Advance Care Planning: Care Instructions Your Care Instructions It can be hard to live with an illness that cannot be cured. But if your health is getting worse, you may want to make decisions about end-of-life care. Planning for the end of your life does not mean that you are giving up. It is a way to make sure that your wishes are met. Clearly stating your wishes can make it easier for your loved ones. Making plans while you are still able may also ease your mind and make your final days less stressful and more meaningful. Follow-up care is a key part of your treatment and safety. Be sure to make and go to all appointments, and call your doctor if you are having problems. It's also a good idea to know your test results and keep a list of the medicines you take. What can you do to plan for the end of life? · You can bring these issues up with your doctor. You do not need to wait until your doctor starts the conversation. You might start with \"I would not be willing to live with . Betha Lute Betha Lute Betha Lute \" When you complete this sentence it helps your doctor understand your wishes. · Talk openly and honestly with your doctor. This is the best way to understand the decisions you will need to make as your health changes. Know that you can always change your mind. · Ask your doctor about commonly used life-support measures. These include tube feedings, breathing machines, and fluids given through a vein (IV). Understanding these treatments will help you decide whether you want them. · You may choose to have these life-supporting treatments for a limited time. This allows a trial period to see whether they will help you.  You may also decide that you want your doctor to take only certain measures to keep you alive. It is important to spell out these conditions so that your doctor and family understand them. · Talk to your doctor about how long you are likely to live. He or she may be able to give you an idea of what usually happens with your specific illness. · Think about preparing papers that state your wishes. This way there will not be any confusion about what you want. You can change your instructions at any time. Which papers should you prepare? Advance directives are legal papers that tell doctors how you want to be cared for at the end of your life. You do not need a  to write these papers. Ask your doctor or your state health department for information on how to write your advance directives. They may have the forms for each of these types of papers. Make sure your doctor has a copy of these on file, and give a copy to a family member or close friend. · Consider a do-not-resuscitate order (DNR). This order asks that no extra treatments be done if your heart stops or you stop breathing. Extra treatments may include cardiopulmonary resuscitation (CPR), electrical shock to restart your heart, or a machine to breathe for you. If you decide to have a DNR order, ask your doctor to explain and write it. Place the order in your home where everyone can easily see it. · Consider a living will. A living will explains your wishes about life support and other treatments at the end of your life if you become unable to speak for yourself. Living ferraro tell doctors to use or not use treatments that would keep you alive. You must have one or two witnesses or a notary present when you sign this form. · Consider a durable power of  for health care. This allows you to name a person to make decisions about your care if you are not able to. Most people ask a close friend or family member.  Talk to this person about the kinds of treatments you want and those that you do not want. Make sure this person understands your wishes. These legal papers are simple to change. Tell your doctor what you want to change, and ask him or her to make a note in your medical file. Give your family updated copies of the papers. Where can you learn more? Go to http://fredis-haja.info/. Enter P184 in the search box to learn more about \"Advance Care Planning: Care Instructions. \" Current as of: November 17, 2016 Content Version: 11.3 © 0326-7139 [employer]. Care instructions adapted under license by [employer] (which disclaims liability or warranty for this information). If you have questions about a medical condition or this instruction, always ask your healthcare professional. [address] any warranty or liability for your use of this information. Introducing [facility] HE[facility]! Dear Angella Bolden: Thank you for requesting a [employer] account. Our records indicate that you already have an active [employer] account. You can access your account anytime at [email or web address] Did you know that you can access your hospital and ER discharge instructions at any time in [employer]? You can also review all of your test results from your hospital stay or ER visit. Additional Information If you have questions, please visit the Frequently Asked Questions section of the [employer] website at [email or web address] Remember, [employer] is NOT to be used for urgent needs. For medical emergencies, dial 911. Now available from your iPhone and Android! Please provide this summary of care documentation to your next provider. Your primary care clinician is listed as Mohamud You. If you have any questions after today's visit, please call [phone].

## 2018-04-05 NOTE — ACP (ADVANCE CARE PLANNING)
Advance Care Planning (ACP) Provider Note - Comprehensive     Date of ACP Conversation: 04/05/18  Persons included in Conversation:  patient  Length of ACP Conversation in minutes:  <16 minutes (Non-Billable)    Authorized Decision Maker (if patient is incapable of making informed decisions): This person is:  Healthcare Agent/Medical Power of  under Advance Directive          General ACP for ALL Patients with Decision Making Capacity:   Exploration of values, goals, and preferences if recovery is not expected, even with continued medical treatment in the event of: Imminent death  Severe, permanent brain injury  \"In these circumstances, what matters most to you? \"  Care focused more on comfort or quality of life.         Interventions Provided:  Recommended communicating the plan and making copies for the healthcare agent, personal physician, and others as appropriate (e.g., health system)

## 2018-04-11 ENCOUNTER — HOSPITAL ENCOUNTER (OUTPATIENT)
Dept: INFUSION THERAPY | Age: 68
Discharge: HOME | End: 2018-04-11
Payer: MEDICARE

## 2018-04-11 VITALS
DIASTOLIC BLOOD PRESSURE: 79 MMHG | OXYGEN SATURATION: 98 % | WEIGHT: 152 LBS | TEMPERATURE: 98.4 F | SYSTOLIC BLOOD PRESSURE: 115 MMHG | BODY MASS INDEX: 21.76 KG/M2 | HEIGHT: 70 IN | HEART RATE: 87 BPM | RESPIRATION RATE: 18 BRPM

## 2018-04-11 PROCEDURE — 96372 THER/PROPH/DIAG INJ SC/IM: CPT

## 2018-04-11 PROCEDURE — 74011250636 HC RX REV CODE- 250/636: Performed by: INTERNAL MEDICINE

## 2018-04-11 RX ADMIN — OMALIZUMAB 150 MG: 202.5 INJECTION, SOLUTION SUBCUTANEOUS at 10:09

## 2018-04-11 RX ADMIN — OMALIZUMAB 150 MG: 202.5 INJECTION, SOLUTION SUBCUTANEOUS at 10:11

## 2018-04-11 NOTE — PROGRESS NOTES
[facility] Progress Note    Date: 2018    Name: Tone Wakefield    MRN: [id number]         : 1950      Mr. Margie Montemayor arrived to [facility] at 950. Mr. Margie Montemayor was assessed and education was provided. Patient states he has been receiving this medication at his physician's office without complications. He declines the written educational information stating he has already received it. Mr. Rickey Grimes vitals were reviewed. Visit Vitals    /79 (BP 1 Location: Left arm, BP Patient Position: Sitting)    Pulse 87    Temp 98.4 °F (36.9 °C)    Resp 18    Ht 5' 10\" (1.778 m)    Wt 68.9 kg (152 lb)    SpO2 98%    BMI 21.81 kg/m2           Xolair 150 mg was administered as ordered SQ in patient's Right deltoid. Site covered with bandaid. Xolair 150 mg was administered as ordered SQ in patient's left deltoid. Site covered with bandaid. Mr. Margie Montemayor tolerated well without complaints. Mr. Margie Montemayor was discharged from [place] [address] in stable condition at 1015. He is to return on 2018 at 1000 for his next appointment.     Enmanuel Harp RN  2018

## 2018-04-24 ENCOUNTER — TELEPHONE (OUTPATIENT)
Dept: FAMILY MEDICINE CLINIC | Age: 68
End: 2018-04-24

## 2018-04-25 ENCOUNTER — APPOINTMENT (OUTPATIENT)
Dept: INFUSION THERAPY | Age: 68
End: 2018-04-25

## 2018-04-25 ENCOUNTER — HOSPITAL ENCOUNTER (OUTPATIENT)
Dept: INFUSION THERAPY | Age: 68
Discharge: HOME | End: 2018-04-25
Payer: MEDICARE

## 2018-04-25 VITALS
TEMPERATURE: 98.3 F | RESPIRATION RATE: 18 BRPM | HEART RATE: 84 BPM | DIASTOLIC BLOOD PRESSURE: 69 MMHG | SYSTOLIC BLOOD PRESSURE: 121 MMHG | OXYGEN SATURATION: 98 %

## 2018-04-25 PROCEDURE — 74011250636 HC RX REV CODE- 250/636: Performed by: INTERNAL MEDICINE

## 2018-04-25 PROCEDURE — 96372 THER/PROPH/DIAG INJ SC/IM: CPT

## 2018-04-25 RX ADMIN — OMALIZUMAB 150 MG: 202.5 INJECTION, SOLUTION SUBCUTANEOUS at 11:01

## 2018-04-25 RX ADMIN — OMALIZUMAB 150 MG: 202.5 INJECTION, SOLUTION SUBCUTANEOUS at 11:03

## 2018-04-25 NOTE — PROGRESS NOTES
[facility] Progress Note    Date: 2018    Name: Sergey Hein    MRN: [id number]         : 1950      Mr. Palomo Peres arrived to [facility] at [phone]. Mr. Palomo Peres was assessed and education was provided. Patient denies any complications today. Mr. Marquise Ya vitals were reviewed. Visit Vitals    /69 (BP 1 Location: Left arm, BP Patient Position: Sitting)    Pulse 84    Temp 98.3 °F (36.8 °C)    Resp 18    SpO2 98%            Xolair 150 mg was administered as ordered SQ in patient's Right upper arm. Site covered with band aid. Xolair 150 mg was administered as ordered SQ in patient's left upper arm. Site covered with band aid. Mr. Palomo Peres tolerated well without complaints. Mr. Palomo Peres was discharged from [place] [address] in stable condition at 1105. He is to return on May 9, 2018 at 1000 for his next appointment.     David Adorno RN  2018

## 2018-05-09 ENCOUNTER — APPOINTMENT (OUTPATIENT)
Dept: INFUSION THERAPY | Age: 68
End: 2018-05-09

## 2018-05-09 ENCOUNTER — HOSPITAL ENCOUNTER (OUTPATIENT)
Dept: INFUSION THERAPY | Age: 68
Discharge: HOME | End: 2018-05-09
Payer: MEDICARE

## 2018-05-09 VITALS
HEART RATE: 75 BPM | DIASTOLIC BLOOD PRESSURE: 76 MMHG | RESPIRATION RATE: 18 BRPM | TEMPERATURE: 98.4 F | SYSTOLIC BLOOD PRESSURE: 101 MMHG

## 2018-05-09 PROCEDURE — 74011250636 HC RX REV CODE- 250/636: Performed by: INTERNAL MEDICINE

## 2018-05-09 PROCEDURE — 96372 THER/PROPH/DIAG INJ SC/IM: CPT

## 2018-05-09 RX ADMIN — OMALIZUMAB 150 MG: 202.5 INJECTION, SOLUTION SUBCUTANEOUS at 10:49

## 2018-05-09 RX ADMIN — OMALIZUMAB 150 MG: 202.5 INJECTION, SOLUTION SUBCUTANEOUS at 10:51

## 2018-05-09 NOTE — PROGRESS NOTES
[facility] Progress Note    Date: May 9, 2018    Name: Charley Doss    MRN: [id number]         : 1950      Mr. Rey to [facility], ambulatory at 1000. Pt was assessed and education was provided. Mr. Shruthi Alvarez vitals were reviewed and patient was observed for 5 minutes prior to treatment. Visit Vitals    /76 (BP 1 Location: Left arm, BP Patient Position: Sitting)    Pulse 75    Temp 98.4 °F (36.9 °C)    Resp 18          150 mg  xolair was administered subcutaneous in left upper arm. No irritation or bleeding noted at site. Bandaid applied. 150 mg xolair was administered subcutaneous in right upper arm. No irritation or bleeding noted. Band aid applied. Mr. Salima Shaw tolerated well, and had no complaints. Patient armband removed and shredded. Mr. Salima Shaw was discharged from [place] [address] in stable condition at 1055.    Randy Triplett RN  May 9, 2018

## 2018-05-23 ENCOUNTER — HOSPITAL ENCOUNTER (OUTPATIENT)
Dept: INFUSION THERAPY | Age: 68
Discharge: HOME | End: 2018-05-23
Payer: MEDICARE

## 2018-05-23 ENCOUNTER — APPOINTMENT (OUTPATIENT)
Dept: INFUSION THERAPY | Age: 68
End: 2018-05-23

## 2018-05-23 VITALS
RESPIRATION RATE: 18 BRPM | HEART RATE: 82 BPM | SYSTOLIC BLOOD PRESSURE: 115 MMHG | TEMPERATURE: 98.8 F | DIASTOLIC BLOOD PRESSURE: 78 MMHG | OXYGEN SATURATION: 97 %

## 2018-05-23 PROCEDURE — 96372 THER/PROPH/DIAG INJ SC/IM: CPT

## 2018-05-23 PROCEDURE — 74011250636 HC RX REV CODE- 250/636: Performed by: INTERNAL MEDICINE

## 2018-05-23 RX ADMIN — OMALIZUMAB 150 MG: 202.5 INJECTION, SOLUTION SUBCUTANEOUS at 14:08

## 2018-05-23 RX ADMIN — OMALIZUMAB 150 MG: 202.5 INJECTION, SOLUTION SUBCUTANEOUS at 14:10

## 2018-05-23 NOTE — PROGRESS NOTES
[facility] Progress Note    Date: May 23, 2018    Name: Sergey Hein    MRN: [id number]         : 1950      Mr. Rey to [facility], ambulatory at 1400. Pt was assessed and education was provided. Mr. Marquise Ya vitals were reviewed and patient was observed for 5 minutes prior to treatment. Visit Vitals    /78 (BP 1 Location: Left arm)    Pulse 82    Temp 98.8 °F (37.1 °C)    Resp 18    SpO2 97%              Xolair 150  mg was administered subcutaneous in  Left upper arm. No irritation or bleeding noted at site. Bandaid applied. Xolair 150 mg was administered SQ in right upper arm. No irritation or bleeding noted. Band aid applied. Mr. Palomo Peres tolerated well, and had no complaints. Patient armband removed and shredded. Mr. Palomo Peres was discharged from [place] [address] in stable condition at 1415. He is to return on 18 at 1000 for his next appointment.     Ruthie Sanchez RN  May 23, 2018

## 2018-06-06 ENCOUNTER — HOSPITAL ENCOUNTER (OUTPATIENT)
Dept: INFUSION THERAPY | Age: 68
Discharge: HOME | End: 2018-06-06
Payer: MEDICARE

## 2018-06-06 VITALS
OXYGEN SATURATION: 99 % | HEART RATE: 84 BPM | SYSTOLIC BLOOD PRESSURE: 130 MMHG | DIASTOLIC BLOOD PRESSURE: 84 MMHG | RESPIRATION RATE: 18 BRPM | TEMPERATURE: 98.6 F

## 2018-06-06 PROCEDURE — 74011250636 HC RX REV CODE- 250/636: Performed by: INTERNAL MEDICINE

## 2018-06-06 PROCEDURE — 96372 THER/PROPH/DIAG INJ SC/IM: CPT

## 2018-06-06 RX ADMIN — OMALIZUMAB 150 MG: 202.5 INJECTION, SOLUTION SUBCUTANEOUS at 10:33

## 2018-06-06 RX ADMIN — OMALIZUMAB 150 MG: 202.5 INJECTION, SOLUTION SUBCUTANEOUS at 10:34

## 2018-06-06 NOTE — PROGRESS NOTES
[facility] Progress Note    Date: 2018    Name: Oz Servin    MRN: [id number]         : 1950    Xolair Injection      Mr. Nallely Bruno to [facility], ambulatory at 1025. Pt was assessed and education was provided. Patient reports symptoms have been well-controlled on Xolair and denies wheezing or coughing. Patient reports needing rescue inhaler \"rarely. \"    Patient denies recent fevers, infections, or use of antibiotics. Mr. Eri Saeed vitals were reviewed and patient was observed for 5 minutes prior to treatment. Visit Vitals    /84 (BP 1 Location: Left arm, BP Patient Position: Sitting)    Pulse 84    Temp 98.6 °F (37 °C)    Resp 18    SpO2 99%       Xolair was divided by pharmacy into multiple injections. 150 mg was administered subcutaneous in  Back of right upper arm. No irritation or bleeding noted at site. Bandaid applied. 150 mg was administered subcutaneous in  Back of left upper arm. No irritation or bleeding noted at site. Bandaid applied. Patient declined to remain in [facility] for 30-minute observation period. No lip/tongue/mouth swelling, SOB, chest pain, difficulty breathing, or other signs of allergic reaction noted before patient left. Mr. Nallely Bruno tolerated well, and had no complaints. Patient armband removed and shredded. Mr. Nallely Bruno was discharged from [place] [address] in stable condition at 1040. He is to return on 18 at 1000 for his next  [facility] appointment.     Sam Gottlieb RN  2018

## 2018-06-14 ENCOUNTER — OFFICE VISIT (OUTPATIENT)
Dept: FAMILY MEDICINE CLINIC | Age: 68
End: 2018-06-14

## 2018-06-14 VITALS
OXYGEN SATURATION: 92 % | DIASTOLIC BLOOD PRESSURE: 72 MMHG | BODY MASS INDEX: 22.82 KG/M2 | SYSTOLIC BLOOD PRESSURE: 112 MMHG | RESPIRATION RATE: 14 BRPM | HEART RATE: 82 BPM | WEIGHT: 159.4 LBS | TEMPERATURE: 98 F | HEIGHT: 70 IN

## 2018-06-14 DIAGNOSIS — Z13.5 SCREENING FOR GLAUCOMA: ICD-10-CM

## 2018-06-14 DIAGNOSIS — M47.27 LUMBOSACRAL RADICULOPATHY DUE TO DEGENERATIVE JOINT DISEASE OF SPINE: Primary | ICD-10-CM

## 2018-06-14 DIAGNOSIS — Z23 ENCOUNTER FOR IMMUNIZATION: ICD-10-CM

## 2018-06-14 NOTE — PROGRESS NOTES
Patient c/o pain in his right hamstring x 3 weeks that is not getting any better, he states he had this issue in the past and found out this was being caused by his back. 1. Have you been to the ER, urgent care clinic since your last visit? Hospitalized since your last visit? No  2. Have you seen or consulted any other health care providers outside of the [facility] since your last visit? Include any pap smears or colon screening. No  Medication reconciliation has been completed with patient. Care team discussed/updated as well as pharmacy. Care everywhere has been ran. Health Maintenance reviewed - Patient will discuss need for vaccines with provider, referral placed for Glaucoma screen.

## 2018-06-14 NOTE — PATIENT INSTRUCTIONS

## 2018-06-14 NOTE — MR AVS SNAPSHOT
[facility] [address] [facility] [facility] [address] 
[phone] Patient: Abyb Badillo MRN: [id number] HRX:[birth year] Visit Information Date & Time Provider Department Dept. Phone Encounter #  
 6/14/2018  9:45 AM Jadyn Oseguera MD [facility] [phone] [id number] Follow-up Instructions Return if symptoms worsen or fail to improve. Upcoming Health Maintenance Date Due ZOSTER VACCINE AGE 60> 6/2/2009 GLAUCOMA SCREENING Q2Y 12/1/2015 Pneumococcal 65+ Low/Medium Risk (2 of 2 - PPSV23) 5/8/2018 Influenza Age 5 to Adult 8/1/2018 MEDICARE YEARLY EXAM 4/6/2019 COLONOSCOPY 6/18/2020 DTaP/Tdap/Td series (2 - Td) 9/9/2020 Allergies as of 6/14/2018  Review Complete On: 6/14/2018 By: Jadyn Oseguera MD  
  
 Severity Noted Reaction Type Reactions Latex  04/08/2015    Hives, Itching Cashew Nut  01/07/2016    Other (comments) \"UPSET STOMACH\" Milk  04/11/2018    Diarrhea Other Medication  01/23/2013    Other (comments) Pt allergic to cats with respiratory, skin reactions. Pt allergic to cashews with upset stomach Vicodin [Hydrocodone-acetaminophen]  05/15/2014    Other (comments)  
 hallucinations Current Immunizations  Reviewed on 6/14/2018 Name Date Influenza Vaccine 11/1/2013 Pneumococcal Conjugate (PCV-13) 2/9/2016 Pneumococcal Polysaccharide (PPSV-23)  Incomplete, 5/8/2013 TDAP Vaccine 9/9/2010 Reviewed by Jadyn Oseguera MD on 6/14/2018 at 10:31 AM  
 Reviewed by Jadyn Oseguera MD on 6/14/2018 at 10:33 AM  
You Were Diagnosed With   
  
 Codes Comments Lumbosacral radiculopathy due to degenerative joint disease of spine    -  Primary ICD-10-CM: M47.27 ICD-9-CM: 722.52 Screening for glaucoma     ICD-10-CM: Z13.5 ICD-9-CM: V80.1 Encounter for immunization     ICD-10-CM: I78 ICD-9-CM: V03.89 Vitals BP Pulse Temp Resp Height(growth percentile) Weight(growth percentile) 112/72 82 98 °F (36.7 °C) (Oral) 14 5' 10\" (1.778 m) 159 lb 6.4 oz (72.3 kg) SpO2 BMI Smoking Status 92% 22.87 kg/m2 Never Smoker BMI and BSA Data Body Mass Index Body Surface Area  
 22.87 kg/m 2 1.89 m 2 Preferred Pharmacy Pharmacy Name Phone [facility] [address] [place] [address], [address] [place] [address] Rd [phone] Your Updated Medication List  
  
   
This list is accurate as of 6/14/18 10:39 AM.  Always use your most recent med list.  
  
  
  
  
 albuterol 2.5 mg /3 mL (0.083 %) nebulizer solution Commonly known as:  PROVENTIL VENTOLIN  
3 mL by Nebulization route every four (4) hours as needed for Wheezing. aspirin 81 mg chewable tablet Take 1 Tab by mouth daily. cetirizine-psuedoePHEDrine 5-120 mg per tablet Commonly known as:  ZyrTEC-D Take 1 Tab by mouth daily. Cholecalciferol (Vitamin D3) 2,000 unit Cap capsule Commonly known as:  VITAMIN D Take 1 Cap by mouth daily. fluticasone-salmeterol 500-50 mcg/dose diskus inhaler Commonly known as:  ADVAIR Take 1 Puff by inhalation every twelve (12) hours. levalbuterol 1.25 mg/3 mL Nebu Commonly known as:  XOPENEX  
3 mL by Nebulization route every six (6) hours as needed. levalbuterol tartrate 45 mcg/actuation inhaler Commonly known as:  Drenda Pickerel Take 2 Puffs by inhalation every four (4) hours as needed for Wheezing.  
  
 montelukast 10 mg tablet Commonly known as:  SINGULAIR Take 1 Tab by mouth daily. Omeprazole delayed release 20 mg tablet Commonly known as:  PRILOSEC D/R Take 1 Tab by mouth daily. SAW PALMETO-MULTIVIT-MIN-AA-FA PO Take 1 Tab by mouth daily. XOLAIR 150 mg Solr Generic drug:  omalizumab  
by SubCUTAneous route once. We Performed the Following ADMIN PNEUMOCOCCAL VACCINE [ [facility]] PNEUMOCOCCAL POLYSACCHARIDE VACCINE, 23-VALENT, ADULT OR IMMUNOSUPPRESSED PT DOSE, [96074 CPT(R)] REFERRAL TO OPHTHALMOLOGY [REF57 Custom] Follow-up Instructions Return if symptoms worsen or fail to improve. To-Do List   
 06/20/2018 10:00 AM  
  Appointment with [facility] FAST TRACK NURSE at [facility] ([phone]) 07/05/2018 10:00 AM  
  Appointment with [facility] FAST TRACK NURSE at [facility] [address] [facility] ([phone])  
  
 07/19/2018 10:00 AM  
  Appointment with [facility] FAST TRACK NURSE at [place] [address] [facility] ([phone]) Referral Information Referral ID Referred By Referred To  
  
 [id number] Marco MATHIS, BONG   
   [address] [facility], [facility] [address] Phone: [phone] Fax: [phone] Visits Status Start Date End Date 1 New Request 6/14/18 6/14/19 If your referral has a status of pending review or denied, additional information will be sent to support the outcome of this decision. Patient Instructions Back Stretches: Exercises Your Care Instructions Here are some examples of exercises for stretching your back. Start each exercise slowly. Ease off the exercise if you start to have pain. Your doctor or physical therapist will tell you when you can start these exercises and which ones will work best for you. How to do the exercises Overhead stretch 1. Stand comfortably with your feet shoulder-width apart. 2. Looking straight ahead, raise both arms over your head and reach toward the ceiling. Do not allow your head to tilt back. 3. Hold for 15 to 30 seconds, then lower your arms to your sides. 4. Repeat 2 to 4 times. Side stretch 1. Stand comfortably with your feet shoulder-width apart. 2. Raise one arm over your head, and then lean to the other side. 3. Slide your hand down your leg as you let the weight of your arm gently stretch your side muscles. Hold for 15 to 30 seconds. 4. Repeat 2 to 4 times on each side. Press-up 1. Lie on your stomach, supporting your body with your forearms. 2. Press your elbows down into the floor to raise your upper back. As you do this, relax your stomach muscles and allow your back to arch without using your back muscles. As your press up, do not let your hips or pelvis come off the floor. 3. Hold for 15 to 30 seconds, then relax. 4. Repeat 2 to 4 times. Relax and rest 
 
1. Lie on your back with a rolled towel under your neck and a pillow under your knees. Extend your arms comfortably to your sides. 2. Relax and breathe normally. 3. Remain in this position for about 10 minutes. 4. If you can, do this 2 or 3 times each day. Follow-up care is a key part of your treatment and safety. Be sure to make and go to all appointments, and call your doctor if you are having problems. It's also a good idea to know your test results and keep a list of the medicines you take. Where can you learn more? Go to http://fredis-haja.info/. Enter U938 in the search box to learn more about \"Back Stretches: Exercises. \" Current as of: March 21, 2017 Content Version: 11.4 © 3275-9701 Healthwise, Incorporated. Care instructions adapted under license by [employer] (which disclaims liability or warranty for this information). If you have questions about a medical condition or this instruction, always ask your healthcare professional. [place] [address] any warranty or liability for your use of this information. Introducing [facility]! Dear Sammy Galeano: Thank you for requesting a Term[employer] account. Our records indicate that you already have an active TermScout account. You can access your account anytime at https://[employer] [place]. [employer] Did you know that you can access your hospital and ER discharge instructions at any time in TermScout?   You can also review all of your test results from your hospital stay or ER visit. Additional Information If you have questions, please visit the Frequently Asked Questions section of the [employer] website at [email or web address] Remember, [employer] is NOT to be used for urgent needs. For medical emergencies, dial 911. Now available from your iPhone and Android! Please provide this summary of care documentation to your next provider. Your primary care clinician is listed as Oli Herzog. If you have any questions after today's visit, please call [phone].

## 2018-06-14 NOTE — PROGRESS NOTES
Jordan Currie is a 75 y.o. male who was seen in clinic today (6/14/2018). Assessment & Plan:       ICD-10-CM ICD-9-CM    1. Lumbosacral radiculopathy due to degenerative joint disease of spine M47.27 722.52    2. Screening for glaucoma Z13.5 V80.1 REFERRAL TO OPHTHALMOLOGY   3. Encounter for immunization Z23 V03.89 ADMIN PNEUMOCOCCAL VACCINE      PNEUMOCOCCAL POLYSACCHARIDE VACCINE, 23-VALENT, ADULT OR IMMUNOSUPPRESSED PT DOSE,     Improving with conservative measures  Declined referral to PT which is reasonable  Reviewed strategies to prevent recurrence      Follow-up Disposition:  Return if symptoms worsen or fail to improve. Subjective:   Jordan Currie was seen today for Leg Pain (Right Hamstring)      3 weeks of sharp pain posterior thigh, +/- radiation around to anterior lower leg  No clear trigger  Worse with walking  Also noting that when he's standing, his right foot is rotated outward  Relieved with stretching, ice, elevation  Gradually improving since onset      Similar issue several years ago ultimately attributed to lumbar source - first approx 10 years ago, more recently in 2014    Procedure: MRI of the lumbar spine without contrast.     CPT code: 06113     Comparisons: None.     Indications:  6 weeks of pain right hamstrings with radiation to toes     Technique: T1 weighted, T2 FSE with fat saturation, FSE inversion recovery  sagittal images are supplemented by T2 weighted with fat saturation and T1  weighted axial images.     Findings:           Sagittal images reveal overall normal vertebral body morphology. No fractures  noted. No suspicious lesions. Alignments are anatomic, no evidence for subluxation.     Conus medullaris ends at the L1 vertebral body level.        Correlation of axial and sagittal images reveals the following:     At L1-L2: No significant disc pathology. No significant facet arthropathy.  No  central canal or foraminal stenosis.     At L2-L3: Mild broad-based disc osteophyte complex posteriorly. No facet  arthropathy. Some posterior epidural lipomatosis with mass effect Mild central  canal stenosis. Mild foraminal stenosis.     At L3-L4: Mild broad-based disc protrusion with underlying osteophyte complex  most prominent at the posterior lateral corners. Posterior epidural lipomatosis  with mass effect. Effective thecal sac AP diameter of 6.6 mm consistent with  moderate to severe central canal stenosis. Mild facet arthropathy. Moderate to  severe left and moderate or moderate to severe right foraminal stenosis with  contact of the nerve roots in the foramina.     At L4-L5: Mild broad-based disc protrusion with underlying osteophyte complex. Most pronounced at the posterior lateral corners. Mild to moderate facet  arthropathy. Moderate or moderate to severe left and moderate right foraminal  stenosis.     At L5-S1: Mild to moderate circumferential disc osteophyte complex. Mild to  moderate facet arthropathy. Posterior epidural lipomatosis with some mass  effect. Effective thecal sac AP diameter of 7.2 mm consistent with moderate  spinal stenosis. Moderate to severe left and moderate right foraminal stenosis.     Visualized portions of the sacroiliac joints are unremarkable. Incidentally  imaged retroperitoneal structures are unremarkable as well.           Impression:     Degenerative changes as above. Prior to Admission medications    Medication Sig Start Date End Date Taking? Authorizing Provider   omalizumab Parishkanwal Alfonso) 150 mg solr by SubCUTAneous route once. Yes Historical Provider   montelukast (SINGULAIR) 10 mg tablet Take 1 Tab by mouth daily. 9/1/17  Yes Riana Zambrano MD   cetirizine-psuedoePHEDrine (ZYRTEC-D) 5-120 mg per tablet Take 1 Tab by mouth daily. 9/1/17  Yes Riana Zambrano MD   Omeprazole delayed release (PRILOSEC D/R) 20 mg tablet Take 1 Tab by mouth daily.  9/1/17  Yes Riana Zambrano MD   fluticasone-salmeterol (ADVAIR) 500-50 mcg/dose diskus inhaler Take 1 Puff by inhalation every twelve (12) hours. 1/31/17  Yes Madan Dillon MD   aspirin 81 mg chewable tablet Take 1 Tab by mouth daily. 3/3/16  Yes Madan Dillon MD   FA/MV-MN/MIN AA JANETT/SAW PAL (SAW PALMETO-MULTIVIT-MIN-AA-FA PO) Take 1 Tab by mouth daily. Yes Historical Provider   Cholecalciferol, Vitamin D3, (VITAMIN D) 2,000 unit Cap Take 1 Cap by mouth daily. 1/19/11  Yes Cheryle Sack, MD   levalbuterol tartrate (XOPENEX) 45 mcg/actuation inhaler Take 2 Puffs by inhalation every four (4) hours as needed for Wheezing. 1/31/17   Madan Dillon MD   levalbuterol (XOPENEX) 1.25 mg/3 mL nebu 3 mL by Nebulization route every six (6) hours as needed. 7/29/16   Madan Dillon MD   albuterol (PROVENTIL VENTOLIN) 2.5 mg /3 mL (0.083 %) nebulizer solution 3 mL by Nebulization route every four (4) hours as needed for Wheezing. 4/1/15   Madan Dillon MD          Allergies   Allergen Reactions    Latex Hives and Itching    Cashew Nut Other (comments)     \"UPSET STOMACH\"    Milk Diarrhea    Other Medication Other (comments)     Pt allergic to cats with respiratory, skin reactions. Pt allergic to cashews with upset stomach    Vicodin [Hydrocodone-Acetaminophen] Other (comments)     hallucinations       Patient Care Team:  Madan Dillon MD as PCP - General (Family Practice)  Madan Dillon MD (Family Practice)  Marilia Branham MD (Pulmonary Disease)      Review of Systems   Constitutional: Negative for fever and weight loss. Neurological:        No loss of bowel or bladder control           Objective:   Physical Exam   Constitutional: He is oriented to person, place, and time. He appears well-developed. No distress. HENT:   Head: Normocephalic and atraumatic. Pulmonary/Chest: Effort normal.   Musculoskeletal:        Lumbar back: He exhibits normal range of motion, no tenderness, no bony tenderness and no deformity.    Neurological: He is alert and oriented to person, place, and time. He has normal strength. No sensory deficit. He displays no Babinski's sign on the right side. He displays no Babinski's sign on the left side. Reflex Scores:       Patellar reflexes are 0 on the right side and 0 on the left side. Achilles reflexes are 0 on the right side and 0 on the left side. Gait: normal  SLR/XSLR:  negative   Psychiatric: He has a normal mood and affect. His behavior is normal. Judgment and thought content normal.     Visit Vitals    /72    Pulse 82    Temp 98 °F (36.7 °C) (Oral)    Resp 14    Ht 5' 10\" (1.778 m)    Wt 159 lb 6.4 oz (72.3 kg)    SpO2 92%    BMI 22.87 kg/m2         Disclaimer:      He expressed understanding with the diagnosis and plan. Alternatives have been explained and offered. Medication risks/benefits/costs/interactions/alternatives discussed with patient. Anticipated time course and progression of condition reviewed. All questions have been addressed. He is encouraged to employ the information provided in the after visit summary, which was reviewed. He is instructed to call the clinic if he has not been notified either by phone or through [address] with the results of his tests or with an appointment plan for any referrals within 1 week(s). No news is not good news; it's no news. The patient  is to call if his condition worsens or fails to improve or if significant side effects are experienced. Aspects of this note may have been generated using voice recognition software. Despite editing, there may be unrecognized errors.        Hay Vega MD

## 2018-06-20 ENCOUNTER — HOSPITAL ENCOUNTER (OUTPATIENT)
Dept: INFUSION THERAPY | Age: 68
Discharge: HOME | End: 2018-06-20
Payer: MEDICARE

## 2018-06-20 VITALS
HEART RATE: 82 BPM | RESPIRATION RATE: 18 BRPM | TEMPERATURE: 96.7 F | SYSTOLIC BLOOD PRESSURE: 136 MMHG | DIASTOLIC BLOOD PRESSURE: 82 MMHG

## 2018-06-20 PROCEDURE — 96372 THER/PROPH/DIAG INJ SC/IM: CPT

## 2018-06-20 PROCEDURE — 74011250636 HC RX REV CODE- 250/636: Performed by: INTERNAL MEDICINE

## 2018-06-20 RX ORDER — OXYCODONE AND ACETAMINOPHEN 10; 325 MG/1; MG/1
TABLET ORAL
COMMUNITY
End: 2018-08-03

## 2018-06-20 RX ADMIN — OMALIZUMAB 150 MG: 202.5 INJECTION, SOLUTION SUBCUTANEOUS at 10:14

## 2018-06-20 RX ADMIN — OMALIZUMAB 150 MG: 202.5 INJECTION, SOLUTION SUBCUTANEOUS at 10:16

## 2018-06-20 NOTE — PROGRESS NOTES
[facility] Progress Note    Date: 2018    Name: Chela Greenwood    MRN: [id number]         : 1950    Xolair    Mr. Carolyn Chandler arrived to [facility] at 1000, accompanied by his wife. . Mr Carolyn Chandler reports that he had a hernia repair yesterday at [facility]. He denies any recent antibiotic use. Mr. Carolyn Chandler was assessed and education was provided. Mr. Rut Terrazas vitals were reviewed. Visit Vitals    /82 (BP 1 Location: Left arm, BP Patient Position: Sitting)    Pulse 82    Temp 96.7 °F (35.9 °C)    Resp 18         Xoliar 150 mg was administered as ordered SQ in patient's Left upper arm . Xolair 150 mg was administered as ordered SQ in patient;s Right upper arm. Mr. Carolyn Chandler tolerated well without complaints. Mr. Carolyn Chandler was discharged from [place] [address] in stable condition at 1020. He is to return on 18 for his next appointment.     Saji Chatman RN  2018

## 2018-07-05 ENCOUNTER — HOSPITAL ENCOUNTER (OUTPATIENT)
Dept: INFUSION THERAPY | Age: 68
Discharge: HOME | End: 2018-07-05
Payer: MEDICARE

## 2018-07-05 VITALS
DIASTOLIC BLOOD PRESSURE: 79 MMHG | TEMPERATURE: 98.2 F | SYSTOLIC BLOOD PRESSURE: 138 MMHG | OXYGEN SATURATION: 100 % | HEART RATE: 77 BPM | RESPIRATION RATE: 18 BRPM

## 2018-07-05 PROCEDURE — 74011250636 HC RX REV CODE- 250/636: Performed by: INTERNAL MEDICINE

## 2018-07-05 PROCEDURE — 96372 THER/PROPH/DIAG INJ SC/IM: CPT

## 2018-07-05 RX ADMIN — OMALIZUMAB 150 MG: 202.5 INJECTION, SOLUTION SUBCUTANEOUS at 11:24

## 2018-07-05 NOTE — PROGRESS NOTES
[facility] Progress Note    Date: 2018    Name: Alena Nieto    MRN: [id number]         : 1950    Xolair Injection      Mr. Sridhar Arroyo to [facility], ambulatory at 1105. Pt was assessed and education was provided. Patient reports symptoms have been well-controlled on Xolair and denies wheezing or coughing today. Patient does not remember the last time he needed his Albuterol, although he did use Xopenex once over the weekend. Patient denies recent fevers, infections, or use of antibiotics. Patient reports he does not have an epipen and will ask Dr. Kayln Lewis if he needs to have one available while receiving Xolair injections. Mr. Kinza Santillan vitals were reviewed and patient was observed for 5 minutes prior to treatment. Visit Vitals    /79 (BP 1 Location: Left arm, BP Patient Position: Sitting)    Pulse 77    Temp 98.2 °F (36.8 °C)    Resp 18    SpO2 100%       Xolair was divided by pharmacy into multiple injections. 150 mg was administered subcutaneous in  Back of right upper arm. No irritation or bleeding noted at site. Bandaid applied. 150 mg was administered subcutaneous in  Back of left upper arm. No irritation or bleeding noted at site. Bandaid applied. Patient declined to remain in [place] for 30 minute observation period. No lip/tongue/mouth swelling, SOB, chest pain, difficulty breathing, or other signs of allergic reaction noted before he left. Mr. Sridhar Arroyo tolerated well, and had no complaints. Patient armband removed and shredded. Mr. Sridhar Arroyo was discharged from [place] [address] in stable condition at 1130. He is to return on 18 at 1000 for his next  [facility] appointment.     Agatha Saab RN  2018

## 2018-07-10 ENCOUNTER — OFFICE VISIT (OUTPATIENT)
Dept: FAMILY MEDICINE CLINIC | Age: 68
End: 2018-07-10

## 2018-07-10 VITALS
WEIGHT: 160.4 LBS | DIASTOLIC BLOOD PRESSURE: 77 MMHG | RESPIRATION RATE: 12 BRPM | TEMPERATURE: 97.8 F | OXYGEN SATURATION: 98 % | HEIGHT: 70 IN | HEART RATE: 68 BPM | SYSTOLIC BLOOD PRESSURE: 126 MMHG | BODY MASS INDEX: 22.96 KG/M2

## 2018-07-10 DIAGNOSIS — M47.27 LUMBOSACRAL RADICULOPATHY DUE TO DEGENERATIVE JOINT DISEASE OF SPINE: Primary | ICD-10-CM

## 2018-07-10 RX ORDER — CETIRIZINE HCL 10 MG
1 TABLET ORAL
COMMUNITY
Start: 2017-09-08 | End: 2018-11-30

## 2018-07-10 RX ORDER — OMEPRAZOLE 40 MG/1
1 CAPSULE, DELAYED RELEASE ORAL
COMMUNITY
Start: 2011-08-19 | End: 2018-11-30

## 2018-07-10 NOTE — PROGRESS NOTES
Raheel York is a 75 y.o. male who was seen in clinic today (7/10/2018). Assessment & Plan:       ICD-10-CM ICD-9-CM    1. Lumbosacral radiculopathy due to degenerative joint disease of spine M47.27 722.52 REFERRAL TO PHYSICAL THERAPY           Follow-up Disposition:  Return if symptoms worsen or fail to improve. Subjective:   Raheel York was seen today for Follow-up (Umbilical hernia repair on 06/19/18 by Dr. Gudelia Marsh )      ER evaluation 6/19/18 led to same day surgery for incarcerated umb hernia. 1 week later a seroma \"spurted out\" went to the ER. Reassuring visit with Dr. Gudelia Marsh yesterday. Here bc ER had advised him to do so. Incidentally notes LBP with radicular symptoms, which had improved with conservative measures, subsequently plateaued. Remote excellent response to traction. Results for orders placed or performed during the hospital encounter of 04/05/18   PSA SCREENING (SCREENING)   Result Value Ref Range    Prostate Specific Ag 2.3 0.0 - 4.0 ng/mL      Prior to Admission medications    Medication Sig Start Date End Date Taking? Authorizing Provider   omalizumab Erabbie Avendaño) 150 mg solr by SubCUTAneous route once. Yes Historical Provider   montelukast (SINGULAIR) 10 mg tablet Take 1 Tab by mouth daily. 9/1/17  Yes Teodora Hubbard MD   cetirizine-psuedoePHEDrine (ZYRTEC-D) 5-120 mg per tablet Take 1 Tab by mouth daily. 9/1/17  Yes Teodora Hubbard MD   Omeprazole delayed release (PRILOSEC D/R) 20 mg tablet Take 1 Tab by mouth daily. 9/1/17  Yes Teodora Hubbard MD   fluticasone-salmeterol (ADVAIR) 500-50 mcg/dose diskus inhaler Take 1 Puff by inhalation every twelve (12) hours. 1/31/17  Yes Teodora Hubbard MD   levalbuterol tartrate [facility]) 45 mcg/actuation inhaler Take 2 Puffs by inhalation every four (4) hours as needed for Wheezing. 1/31/17  Yes Teodora Hubbard MD   levalbuterol (XOPENEX) 1.25 mg/3 mL nebu 3 mL by Nebulization route every six (6) hours as needed. 7/29/16  Yes Christian Haines MD   aspirin 81 mg chewable tablet Take 1 Tab by mouth daily. 3/3/16  Yes Christian Haines MD   albuterol (PROVENTIL VENTOLIN) 2.5 mg /3 mL (0.083 %) nebulizer solution 3 mL by Nebulization route every four (4) hours as needed for Wheezing. 4/1/15  Yes Christian Haines MD   FA/MV-MN/MIN AA JANETT/SAW PAL (SAW PALMETO-MULTIVIT-MIN-AA-FA PO) Take 1 Tab by mouth daily. Yes Historical Provider   Cholecalciferol, Vitamin D3, (VITAMIN D) 2,000 unit Cap Take 1 Cap by mouth daily. 1/19/11  Yes Jeniffer Broussard MD   omeprazole (PRILOSEC) 40 mg capsule Take 1 Cap by mouth. 8/19/11   Historical Provider   cetirizine (ZYRTEC) 10 mg tablet Take 1 Tab by mouth. 9/8/17   Historical Provider   oxyCODONE-acetaminophen (PERCOCET 10)  mg per tablet Take  by mouth. Historical Provider          Allergies   Allergen Reactions    Latex Hives and Itching    Cashew Nut Other (comments)     \"UPSET STOMACH\"    Milk Diarrhea    Other Medication Other (comments)     Pt allergic to cats with respiratory, skin reactions. Pt allergic to cashews with upset stomach    Vicodin [Hydrocodone-Acetaminophen] Other (comments)     hallucinations       Patient Care Team:  Christian Haines MD as PCP - General (Family Practice)  Christian Haines MD (Family Practice)  Ki Byrne MD (Pulmonary Disease)  Margie Stinson MD as Surgeon (Surgery)      Review of Systems   Constitutional: Negative for chills, fever and malaise/fatigue. Respiratory: Negative for cough. Gastrointestinal: Negative for abdominal pain, nausea and vomiting. Objective:     Visit Vitals    /77 (BP 1 Location: Right arm, BP Patient Position: Sitting)    Pulse 68    Temp 97.8 °F (36.6 °C) (Oral)    Resp 12    Ht 5' 10\" (1.778 m)    Wt 160 lb 6.4 oz (72.8 kg)    SpO2 98%    BMI 23.02 kg/m2      Physical Exam   Constitutional: He is oriented to person, place, and time. He appears well-developed. No distress. HENT:   Head: Normocephalic and atraumatic. Pulmonary/Chest: Effort normal.   Neurological: He is alert and oriented to person, place, and time. Psychiatric: He has a normal mood and affect. His behavior is normal. Judgment and thought content normal.             Disclaimer: The patient understands our medical plan. Alternatives have been explained and offered. The risks, benefits and significant side effects of all medications have been reviewed. Anticipated time course and progression of condition reviewed. All questions have been addressed. He is encouraged to employ the information provided in the after visit summary, which was reviewed. Where appropriate, he is instructed to call the clinic if he has not been notified either by phone or through [address] with the results of his tests or with an appointment plan for any referrals within 1 week(s). No news is not good news; it's no news. The patient  is to call if his condition worsens or fails to improve or if significant side effects are experienced. Aspects of this note may have been generated using voice recognition software. Despite editing, there may be unrecognized errors.        Johann Nails MD

## 2018-07-10 NOTE — PATIENT INSTRUCTIONS
Call your insurance company to verify their coverage of Shingrix (the new shingles vaccine). How much will they expect you to pay? Will they pay at the office or only at the pharmacy? Then call our office for appropriate assistance. Shingles Vaccine: What You Need to Know  What is shingles? Shingles is a painful skin rash, often with blisters. It is also called herpes zoster, or just zoster. A shingles rash usually appears on one side of the face or body and lasts from 2 to 4 weeks. Its main symptom is pain, which can be quite severe. Other symptoms of shingles can include fever, headache, chills and upset stomach. Very rarely, a shingles infection can lead to pneumonia, hearing problems, blindness, brain inflammation (encephalitis) or death. For about 1 person in 5, severe pain can continue even long after the rash clears up. This is called post-herpetic neuralgia. Shingles is caused by the varicella zoster virus, the same virus that causes chickenpox. Only someone who has had chickenpox-or, rarely, has gotten chickenpox vaccine-can get shingles. The virus stays in your body, and can cause shingles many years later. You can't catch shingles from another person with shingles. However, a person who has never had chickenpox (or chickenpox vaccine) could get chickenpox from someone with shingles. This is not very common. Shingles is far more common in people 48years of age and older than in younger people. It is also more common in people whose immune systems are weakened because of a disease such as cancer, or drugs such as steroids or chemotherapy. At least 1 million people a year in the [facility] get shingles.

## 2018-07-10 NOTE — MR AVS SNAPSHOT
[facility] [address] [place] [address] 
[phone] Patient: Luann Conner MRN: [id number] EVH:8/0/1971 Visit Information Date & Time Provider Department Dept. Phone Encounter #  
 7/10/2018  2:30 PM Danielle Escamilla MD [facility] [phone] [id number] Follow-up Instructions Return if symptoms worsen or fail to improve. Upcoming Health Maintenance Date Due ZOSTER VACCINE AGE 60> 6/2/2009 GLAUCOMA SCREENING Q2Y 12/1/2015 Influenza Age 5 to Adult 8/1/2018 MEDICARE YEARLY EXAM 4/6/2019 COLONOSCOPY 6/18/2020 DTaP/Tdap/Td series (2 - Td) 9/9/2020 Allergies as of 7/10/2018  Review Complete On: 7/10/2018 By: Danielle Escamilla MD  
  
 Severity Noted Reaction Type Reactions Latex  04/08/2015    Hives, Itching Cashew Nut  01/07/2016    Other (comments) \"UPSET STOMACH\" Milk  04/11/2018    Diarrhea Other Medication  01/23/2013    Other (comments) Pt allergic to cats with respiratory, skin reactions. Pt allergic to cashews with upset stomach Vicodin [Hydrocodone-acetaminophen]  05/15/2014    Other (comments)  
 hallucinations Current Immunizations  Reviewed on 6/14/2018 Name Date Influenza Vaccine 11/1/2013 Pneumococcal Conjugate (PCV-13) 2/9/2016 Pneumococcal Polysaccharide (PPSV-23) 6/14/2018, 5/8/2013 TDAP Vaccine 9/9/2010 Not reviewed this visit You Were Diagnosed With   
  
 Codes Comments Lumbosacral radiculopathy due to degenerative joint disease of spine    -  Primary ICD-10-CM: M47.27 ICD-9-CM: 722.52 Vitals BP Pulse Temp Resp Height(growth percentile) Weight(growth percentile) 126/77 (BP 1 Location: Right arm, BP Patient Position: Sitting) 68 97.8 °F (36.6 °C) (Oral) 12 5' 10\" (1.778 m) 160 lb 6.4 oz (72.8 kg) SpO2 BMI Smoking Status 98% 23.02 kg/m2 Never Smoker BMI and BSA Data Body Mass Index Body Surface Area 23.02 kg/m 2 1.9 m 2 Preferred Pharmacy Pharmacy Name Phone [facility] [address] [place] [address], [address] [place] [address] [phone] Your Updated Medication List  
  
   
This list is accurate as of 7/10/18  3:40 PM.  Always use your most recent med list.  
  
  
  
  
 albuterol 2.5 mg /3 mL (0.083 %) nebulizer solution Commonly known as:  PROVENTIL VENTOLIN  
3 mL by Nebulization route every four (4) hours as needed for Wheezing. aspirin 81 mg chewable tablet Take 1 Tab by mouth daily. cetirizine 10 mg tablet Commonly known as:  ZYRTEC Take 1 Tab by mouth. cetirizine-psuedoePHEDrine 5-120 mg per tablet Commonly known as:  ZyrTEC-D Take 1 Tab by mouth daily. Cholecalciferol (Vitamin D3) 2,000 unit Cap capsule Commonly known as:  VITAMIN D Take 1 Cap by mouth daily. fluticasone-salmeterol 500-50 mcg/dose diskus inhaler Commonly known as:  ADVAIR Take 1 Puff by inhalation every twelve (12) hours. levalbuterol 1.25 mg/3 mL Nebu Commonly known as:  XOPENEX  
3 mL by Nebulization route every six (6) hours as needed. levalbuterol tartrate 45 mcg/actuation inhaler Commonly known as:  [place] Jose Take 2 Puffs by inhalation every four (4) hours as needed for Wheezing.  
  
 montelukast 10 mg tablet Commonly known as:  SINGULAIR Take 1 Tab by mouth daily. * omeprazole 40 mg capsule Commonly known as:  PRILOSEC Take 1 Cap by mouth. * Omeprazole delayed release 20 mg tablet Commonly known as:  PRILOSEC D/R Take 1 Tab by mouth daily. oxyCODONE-acetaminophen  mg per tablet Commonly known as:  PERCOCET 10 Take  by mouth. SAW PALMETO-MULTIVIT-MIN-AA-FA PO Take 1 Tab by mouth daily. XOLAIR 150 mg Solr Generic drug:  omalizumab  
by SubCUTAneous route once. * Notice:   This list has 2 medication(s) that are the same as other medications prescribed for you. Read the directions carefully, and ask your doctor or other care provider to review them with you. We Performed the Following REFERRAL TO PHYSICAL THERAPY [OQV16 Custom] Comments:  
 Tomer Quintero is a 75 y.o. male with lumbar DJD with radiculopathy, previously responded well to traction. Please evaluate and treat to include modalities as indicated. Thank you. Follow-up Instructions Return if symptoms worsen or fail to improve. To-Do List   
 07/19/2018 10:00 AM  
  Appointment with THE [facility] FAST TRACK NURSE at [facility] [address] THE [facility] ([phone]) 08/02/2018 10:30 AM  
  Appointment with THE [facility] FAST TRACK NURSE at [place] [address] THE [facility] ([phone])  
  
 08/16/2018 10:00 AM  
  Appointment with THE [facility] FAST TRACK NURSE at [place] [address] THE [facility] ([phone]) Referral Information Referral ID Referred By Referred To  
  
 [id number] Osmin Michaels [address] [place] [address] [address] [place] [address] Road Phone: [phone] Fax: [phone] Visits Status Start Date End Date 1 New Request 7/10/18 7/10/19 If your referral has a status of pending review or denied, additional information will be sent to support the outcome of this decision. Patient Instructions Call your insurance company to verify their coverage of Shingrix (the new shingles vaccine). How much will they expect you to pay? Will they pay at the office or only at the pharmacy? Then call our office for appropriate assistance. Shingles Vaccine: What You Need to Know What is shingles? Shingles is a painful skin rash, often with blisters. It is also called herpes zoster, or just zoster. A shingles rash usually appears on one side of the face or body and lasts from 2 to 4 weeks. Its main symptom is pain, which can be quite severe.  Other symptoms of shingles can include fever, headache, chills and upset stomach. Very rarely, a shingles infection can lead to pneumonia, hearing problems, blindness, brain inflammation (encephalitis) or death. For about 1 person in 5, severe pain can continue even long after the rash clears up. This is called post-herpetic neuralgia. Shingles is caused by the varicella zoster virus, the same virus that causes chickenpox. Only someone who has had chickenpox-or, rarely, has gotten chickenpox vaccine-can get shingles. The virus stays in your body, and can cause shingles many years later. You can't catch shingles from another person with shingles. However, a person who has never had chickenpox (or chickenpox vaccine) could get chickenpox from someone with shingles. This is not very common. Shingles is far more common in people 48years of age and older than in younger people. It is also more common in people whose immune systems are weakened because of a disease such as cancer, or drugs such as steroids or chemotherapy. At least 1 million people a year in the [facility] get shingles. Introducing [facility]! Dear Talha Espinal: Thank you for requesting a [employer] account. Our records indicate that you already have an active [employer] account. You can access your account anytime at [email or web address] Did you know that you can access your hospital and ER discharge instructions at any time in [employer]? You can also review all of your test results from your hospital stay or ER visit. Additional Information If you have questions, please visit the Frequently Asked Questions section of the [employer] website at [email or web address] Remember, [employer] is NOT to be used for urgent needs. For medical emergencies, dial 911. Now available from your iPhone and Android! Please provide this summary of care documentation to your next provider. Your primary care clinician is listed as Stella Purchase.  If you have any questions after today's visit, please call [phone].

## 2018-07-10 NOTE — PROGRESS NOTES
Chief Complaint   Patient presents with    Follow-up     Umbilical hernia repair on 06/19/18 by Dr. Ivett Dixon of Care:  1. Have you been to the ER, urgent care clinic since your last visit? Hospitalized since your last visit? Yes Bruce on 06/19/18 had hernia repair    2. Have you seen or consulted any other health care providers outside of the [address] [place] [address] since your last visit? Include any pap smears or colon screening. no    Pharmacy verified  Care Team verified and updated. Please see Red banners under Allergies, Med rec, Immunizations to remove outside inquires. All correct information has been verified with patient and added to chart. Pt preferred language for health care discussion is english.     Is someone accompanying this pt? no    Is the patient using any DME equipment during OV? no    Depression Screening:  PHQ over the last two weeks 7/10/2018 4/5/2018 3/20/2018 6/29/2017 4/4/2017 2/9/2016 1/8/2015   Little interest or pleasure in doing things Not at all Not at all Not at all Not at all Several days Nearly every day Not at all   Feeling down, depressed or hopeless Not at all Not at all Not at all Not at all Several days Nearly every day Not at all   Total Score PHQ 2 0 0 0 0 2 6 0   Trouble falling or staying asleep, or sleeping too much - - - - - Nearly every day -   Feeling tired or having little energy - - - - - Nearly every day -   Poor appetite or overeating - - - - - Nearly every day -   Feeling bad about yourself - or that you are a failure or have let yourself or your family down - - - - - Nearly every day -   Trouble concentrating on things such as school, work, reading or watching TV - - - - - Not at all -   Moving or speaking so slowly that other people could have noticed; or the opposite being so fidgety that others notice - - - - - Not at all -   Thoughts of being better off dead, or hurting yourself in some way - - - - - Nearly every day -   PHQ 9 Score - - - - - 21 -   How difficult have these problems made it for you to do your work, take care of your home and get along with others - - - - - Somewhat difficult -       Learning Assessment:  Learning Assessment 4/8/2015 1/31/2014 11/1/2013 11/28/2012   PRIMARY LEARNER Patient Patient Patient Patient   HIGHEST LEVEL OF EDUCATION - PRIMARY LEARNER  - > 4 YEARS OF COLLEGE - -   BARRIERS PRIMARY LEARNER - NONE - [address] CAREGIVER - No - -   PRIMARY LANGUAGE ENGLISH ENGLISH ENGLISH ENGLISH   LEARNER PREFERENCE PRIMARY DEMONSTRATION OTHER (COMMENT) READING VIDEOS     READING - - -   ANSWERED BY patient Patient Patient patient   RELATIONSHIP SELF SELF SELF -       Abuse Screening:  No flowsheet data found. Health Maintenance reviewed and discussed per provider. Yes    Pt is due for   Health Maintenance Due   Topic Date Due    ZOSTER VACCINE AGE 60>  06/02/2009    GLAUCOMA SCREENING Q2Y  12/01/2015   . Please order/place referral if appropriate. Trisha Mckeon is updated on all     Advance Directive:  1. Do you have an advance directive in place?  Patient Reply:yes

## 2018-07-11 ENCOUNTER — HOSPITAL ENCOUNTER (OUTPATIENT)
Dept: PHYSICAL THERAPY | Age: 68
Discharge: HOME | End: 2018-07-11
Payer: MEDICARE

## 2018-07-11 PROCEDURE — G8979 MOBILITY GOAL STATUS: HCPCS | Performed by: PHYSICAL THERAPIST

## 2018-07-11 PROCEDURE — 97110 THERAPEUTIC EXERCISES: CPT | Performed by: PHYSICAL THERAPIST

## 2018-07-11 PROCEDURE — G8978 MOBILITY CURRENT STATUS: HCPCS | Performed by: PHYSICAL THERAPIST

## 2018-07-11 PROCEDURE — 97161 PT EVAL LOW COMPLEX 20 MIN: CPT | Performed by: PHYSICAL THERAPIST

## 2018-07-11 NOTE — PROGRESS NOTES
PT DAILY TREATMENT NOTE - [facility]     Patient Name: Emerald Marcano  Date:2018  : 1950  [x]  Patient  Verified  Payor: [place] MEDICARE / Plan: [place] MEDICARE PART A & B / Product Type: Medicare /    In time:11:00  Out time:11:45  Total Treatment Time (min): 45  Total Timed Codes (min): 25  1:1 Treatment Time ([facility] only): 25   Visit #: 1 of 8    Treatment Area: Low back pain [M54.5]    SUBJECTIVE  Pain Level (0-10 scale): 2-3/10  Any medication changes, allergies to medications, adverse drug reactions, diagnosis change, or new procedure performed?: [x] No    [] Yes (see summary sheet for update)  Subjective functional status/changes:   [] No changes reported  See Evaluation. OBJECTIVE    20 min [x]Eval                  []Re-Eval       25 min Therapeutic Exercise:  [] See flow sheet :   Rationale: increase ROM to improve the patients ability to increase their functional activity level. With   [] TE   [] TA   [] neuro   [] other: Patient Education: [x] Review HEP    [] Progressed/Changed HEP based on:   [] positioning   [] body mechanics   [] transfers   [] heat/ice application    [] other:      Other Objective/Functional Measures: See Evaluation. Pain Level (0-10 scale) post treatment: 0/10    ASSESSMENT/Changes in Function: See Evaluation. Patient will continue to benefit from skilled PT services to modify and progress therapeutic interventions, address functional mobility deficits and analyze and modify body mechanics/ergonomics to attain remaining goals. [x]  See Plan of Care  []  See progress note/recertification  []  See Discharge Summary         Progress towards goals / Updated goals:  1. Patient's pain level will be 0-3/10 with activity in order to improve patient's ability to perform normal ADLs. Evaluation:  0/10-10/10  2. Patient will be able to lift and carry grocery bags with no limitations in order to return to his normal ADLs.   Evaluation:  Limited in lifting and carrying grocery bags. 3. Patient will increase FOTO score to 85 pts to increase functional mobility. Evaluation:  83.     PLAN  [x]  Upgrade activities as tolerated     [x]  Continue plan of care  []  Update interventions per flow sheet       []  Discharge due to:_  []  Other:_      Bren Overcast, PT 7/11/2018  12:16 PM    Future Appointments  Date Time Provider Chad Pérez   7/16/2018 3:00 PM Bren Overcast, PT [facility]   7/19/2018 10:00 AM [facility] FAST TRACK NURSE MARYCRUZ Fernandez [address] [facility]   7/20/2018 9:00 AM Bren Overcast, PT [facility]   7/23/2018 3:00 PM Bren Overcast, PT [facility]   7/27/2018 9:30 AM Bren Overcast, PT [facility]   7/30/2018 3:00 PM Bren Overcast, PT [facility]   8/2/2018 10:30 AM [facility] FAST TRACK NURSE MARYCRUZ Fernandez [address] [facility]   8/3/2018 11:00 AM Bren Overcast, PT [facility]   8/16/2018 10:00 AM [facility] FAST TRACK NURSE MARYCRUZ Fernandez [address] THE [facility]

## 2018-07-11 NOTE — PROGRESS NOTES
[facility] at [address] [facility] [address] [facility], [address] [place] [address]  Phone: [phone]      Fax:  [phone]    Plan of Care/ Statement of Necessity for Physical Therapy Services  Patient name: Keith Marks Start of Care: 2018   Referral source: Alicia Mendez MD : 1950    Medical Diagnosis: Low back pain [M54.5]   Onset Date:18    Treatment Diagnosis: L/S Radiculopathy with right hamstring pain   Prior Hospitalization: see medical history Provider#: [id number]   Medications: Verified on Patient summary List    Comorbidities: Hernia repair on 18, History of back problems, Arthritis. Prior Level of Function: Running on Treadmill 2-3 miles, 3-4x/week, weight lifting      The Plan of Care and following information is based on the information from the initial evaluation. Assessment/ key information: Patient with signs and symptoms consistent with L/S radiculopathy, patient has right hamstring pain. Patient will benefit from a program of skilled physical therapy to include therapeutic exercises to address strength deficits, therapeutic activities to improve functional mobility, neuromuscular reeducation to address balance, coordination and proprioception, manual therapy to address ROM and tissue extensibility and modalities as indicated. All questions were answered. Evaluation Complexity History MEDIUM  Complexity : 1-2 comorbidities / personal factors will impact the outcome/ POC ; Examination MEDIUM Complexity : 3 Standardized tests and measures addressing body structure, function, activity limitation and / or participation in recreation  ;Presentation MEDIUM Complexity : Evolving with changing characteristics  ; Clinical Decision Making LOW Complexity : FOTO score of   Overall Complexity Rating: LOW   Problem List: pain affecting function, decrease ADL/ functional abilitiies and decrease activity tolerance   Treatment Plan may include any combination of the following: Therapeutic exercise, Therapeutic activities, Neuromuscular re-education, Physical agent/modality, Manual therapy and Other: dry needling. Patient / Family readiness to learn indicated by: asking questions, trying to perform skills and interest  Persons(s) to be included in education: patient (P)  Barriers to Learning/Limitations: None  Patient Goal (s): get beyond the leg pain  Patient Self Reported Health Status: excellent  Rehabilitation Potential: good    Short Term Goals: To be accomplished in 1 weeks:  1. Patient will become proficient in her HEP and will be compliant in performing that program.    Long Term Goals: To be accomplished in 4 weeks:   1. Patient's pain level will be 0-3/10 with activity in order to improve patient's ability to perform normal ADLs. 2. Patient will be able to lift and carry grocery bags with no limitations in order to return to his normal ADLs. 3. Patient will increase FOTO score to 85 pts to increase functional mobility    Frequency / Duration: Patient to be seen 2 times per week for 4 weeks. Patient/ Caregiver education and instruction: Diagnosis, prognosis, exercises   [x]  Plan of care has been reviewed with PTA    G-Codes (GP)  Mobility   Current  CI= 1-19%   Goal  CI= 1-19%    The severity rating is based on clinical judgment and the FOTO score. Certification Period: 7/11/18 - 9/9/18  Brandy May, PT 7/11/2018 12:06 PM  _____________________________________________________________________  I certify that the above Therapy Services are being furnished while the patient is under my care. I agree with the treatment plan and certify that this therapy is necessary.     Physician's Signature:____________________  Date:__________Time:______    Please sign and return to [facility] at [facility] [place] [address] [address] [facility]  Phone: [phone]      Fax:  [phone]

## 2018-07-16 ENCOUNTER — HOSPITAL ENCOUNTER (OUTPATIENT)
Dept: PHYSICAL THERAPY | Age: 68
Discharge: HOME | End: 2018-07-16
Payer: MEDICARE

## 2018-07-16 PROCEDURE — 97110 THERAPEUTIC EXERCISES: CPT | Performed by: PHYSICAL THERAPIST

## 2018-07-16 PROCEDURE — 97140 MANUAL THERAPY 1/> REGIONS: CPT | Performed by: PHYSICAL THERAPIST

## 2018-07-16 PROCEDURE — 97112 NEUROMUSCULAR REEDUCATION: CPT | Performed by: PHYSICAL THERAPIST

## 2018-07-16 NOTE — PROGRESS NOTES
PT DAILY TREATMENT NOTE - [facility]     Patient Name: Mary Burnett  Date:2018  : 1950  [x]  Patient  Verified  Payor: VA MEDICARE / Plan: VA MEDICARE PART A & B / Product Type: Medicare /    In time:3:00  Out time:4:00  Total Treatment Time (min): 60  Total Timed Codes (min): 60  1:1 Treatment Time ([address] only): 45   Visit #: 2 of 8    Treatment Area: Low back pain [M54.5]    SUBJECTIVE  Pain Level (0-10 scale): 0/10  Any medication changes, allergies to medications, adverse drug reactions, diagnosis change, or new procedure performed?: [x] No    [] Yes (see summary sheet for update)  Subjective functional status/changes:   [] No changes reported  Patient denies any lower back pain but still notes the right posterior HS pain. He notes the pain is fairly constant and is located along the medial HS muscle. OBJECTIVE    35 min Therapeutic Exercise:  [] See flow sheet :   Rationale: increase ROM and increase strength to improve the patients ability to increase their functional activity level. 15 min Neuromuscular Re-education:  []  See flow sheet :   Rationale: increase strength, improve coordination and increase proprioception  to improve the patients ability to increase core strength to improve lumbar stability. 10 min Manual Therapy:  Manual stretching HS, KTC, LTR   Rationale: increase ROM and increase tissue extensibility to increase ease of motion to improve function. With   [] TE   [] TA   [] neuro   [] other: Patient Education: [x] Review HEP    [] Progressed/Changed HEP based on:   [] positioning   [] body mechanics   [] transfers   [] heat/ice application    [] other:      Other Objective/Functional Measures:   Gait is without deviation at normal haim. He has good HS mobility with SLR to 80 degrees. There is no tenderness to palpation along the medial HS muscle. Pain Level (0-10 scale) post treatment: 0/10.     ASSESSMENT/Changes in Function:   Patient continues with posterior thigh pain. He is getting less pain with current plan of exercises. Patient will continue to benefit from skilled PT services to modify and progress therapeutic interventions, address functional mobility deficits and analyze and modify body mechanics/ergonomics to attain remaining goals. [x]  See Plan of Care  []  See progress note/recertification  []  See Discharge Summary         Progress towards goals / Updated goals:  1. Patient's pain level will be 0-3/10 with activity in order to improve patient's ability to perform normal ADLs. Evaluation:  0/10-10/10  2. Patient will be able to lift and carry grocery bags with no limitations in order to return to his normal ADLs. Evaluation:  Limited in lifting and carrying grocery bags. 3. Patient will increase FOTO score to 85 pts to increase functional mobility. Evaluation:  83.     PLAN  [x]  Upgrade activities as tolerated     [x]  Continue plan of care  []  Update interventions per flow sheet       []  Discharge due to:_  []  Other:_      Mirela English PT 7/16/2018  4:04 PM    Future Appointments  Date Time Provider Chad Pérez   7/19/2018 10:00 AM THE [facility] FAST TRACK NURSE MARYCRUZ Fernandez 417 THE [facility]   7/20/2018 9:00 AM Mirela English PT [facility]   7/23/2018 3:00 PM Mirela English, PT [facility]   7/27/2018 9:30 AM Mirela English, PT [facility]   7/30/2018 3:00 PM Mirela English, PT [facility]   8/2/2018 10:30 AM THE [facility] FAST TRACK NURSE MARYCRUZ Fernandez 417 THE [facility]   8/3/2018 11:00 AM [facility]ULE[facility]A [facility]   8/16/2018 10:00 AM THE [facility] FAST TRACK NURSE DOMENICOKATHERYN Fernandez 417 [facility]

## 2018-07-19 ENCOUNTER — HOSPITAL ENCOUNTER (OUTPATIENT)
Dept: INFUSION THERAPY | Age: 68
Discharge: HOME | End: 2018-07-19
Payer: MEDICARE

## 2018-07-19 VITALS
HEART RATE: 85 BPM | OXYGEN SATURATION: 99 % | SYSTOLIC BLOOD PRESSURE: 112 MMHG | RESPIRATION RATE: 18 BRPM | DIASTOLIC BLOOD PRESSURE: 71 MMHG | TEMPERATURE: 98.5 F

## 2018-07-19 PROCEDURE — 96372 THER/PROPH/DIAG INJ SC/IM: CPT

## 2018-07-19 PROCEDURE — 74011250636 HC RX REV CODE- 250/636: Performed by: INTERNAL MEDICINE

## 2018-07-19 RX ADMIN — OMALIZUMAB 150 MG: 202.5 INJECTION, SOLUTION SUBCUTANEOUS at 12:21

## 2018-07-19 RX ADMIN — OMALIZUMAB 150 MG: 202.5 INJECTION, SOLUTION SUBCUTANEOUS at 12:23

## 2018-07-19 NOTE — PROGRESS NOTES
[facility] Progress Note    Date: 2018    Name: Don Silveira    MRN: [id number]         : 1950      Mr. Ricardo Hubbard arrived to [facility] at [id number]. Mr. Ricardo Hubbard was assessed and education was provided. Patient denies any new complications. Mr. Barber Boards vitals were reviewed. Visit Vitals    /71 (BP 1 Location: Right arm, BP Patient Position: Sitting)    Pulse 85    Temp 98.5 °F (36.9 °C)    Resp 18    SpO2 99%             Xolair 150 mg was administered as ordered SQ in patient's Right upper arm . Xolair 150 mg was administered as ordered SQ in patient's left upper arm . Mr. Ricardo Hubbard tolerated well without complaints. Mr. Ricardo Hubbard was discharged from [place] [address] in stable condition at 1225. He is to return on 2018 at 1030 for his next appointment.     Charmayne Ma, RN  2018

## 2018-07-20 ENCOUNTER — HOSPITAL ENCOUNTER (OUTPATIENT)
Dept: PHYSICAL THERAPY | Age: 68
Discharge: HOME | End: 2018-07-20
Payer: MEDICARE

## 2018-07-20 PROCEDURE — 97140 MANUAL THERAPY 1/> REGIONS: CPT | Performed by: PHYSICAL THERAPIST

## 2018-07-20 PROCEDURE — 97110 THERAPEUTIC EXERCISES: CPT | Performed by: PHYSICAL THERAPIST

## 2018-07-20 NOTE — PROGRESS NOTES
PT DAILY TREATMENT NOTE - [facility]     Patient Name: Sangeeta Andres  Date:2018  : 1950  [x]  Patient  Verified  Payor: VA MEDICARE / Plan: VA MEDICARE PART A & B / Product Type: Medicare /    In time:9:00  Out time:9:55  Total Treatment Time (min): 55  Total Timed Codes (min): 55  1:1 Treatment Time ([address] only): 30   Visit #: 3 of 8    Treatment Area: Low back pain [M54.5]    SUBJECTIVE  Pain Level (0-10 scale): 0/10  Any medication changes, allergies to medications, adverse drug reactions, diagnosis change, or new procedure performed?: [x] No    [] Yes (see summary sheet for update)  Subjective functional status/changes:   [] No changes reported  Patient reports that the posterior thigh pain has diminished quite a bit now. Pain continues to be exacerbated by driving. OBJECTIVE    30 min Therapeutic Exercise:  [] See flow sheet :   Rationale: increase ROM and increase strength to improve the patients ability to increase their functional activity level. 15 min Neuromuscular Re-education:  []  See flow sheet :   Rationale: increase strength, improve coordination and increase proprioception  to improve the patients ability to increase core strength to improve lumbar stability. 10 min Manual Therapy:  Manual stretching HS, KTC, LTR   Rationale: increase ROM and increase tissue extensibility to increase ease of motion to improve function. With   [] TE   [] TA   [] neuro   [] other: Patient Education: [x] Review HEP    [] Progressed/Changed HEP based on:   [] positioning   [] body mechanics   [] transfers   [] heat/ice application    [] other:      Other Objective/Functional Measures: Patient has SLR to 85 degrees bilaterally. He has no gait deviation. Lumbar ROM is WFL. .     Pain Level (0-10 scale) post treatment: 0/10    ASSESSMENT/Changes in Function: Patient with decreased subjective c/o pain. He has improving mobility and function.     Patient will continue to benefit from skilled PT services to modify and progress therapeutic interventions, address functional mobility deficits and analyze and modify body mechanics/ergonomics to attain remaining goals. [x]  See Plan of Care  []  See progress note/recertification  []  See Discharge Summary         Progress towards goals / Updated goals:  1. Patient's pain level will be 0-3/10 with activity in order to improve patient's ability to perform normal ADLs. Evaluation:  0/10-10/10  Current:  0-6/10.  2. Patient will be able to lift and carry grocery bags with no limitations in order to return to his normal ADLs. Evaluation:  Limited in lifting and carrying grocery bags. 3. Patient will increase FOTO score to 85 pts to increase functional mobility. Evaluation:  83.     PLAN  [x]  Upgrade activities as tolerated     [x]  Continue plan of care  []  Update interventions per flow sheet       []  Discharge due to:_  []  Other:_      Lynne Prader, PT 7/20/2018  9:29 AM    Future Appointments  Date Time Provider Chad Pérez   7/23/2018 3:00 PM Lynne Prader, PT [facility]   7/27/2018 9:30 AM Lynne Prader, PT [facility]   7/30/2018 3:00 PM Lynne Prader, PT [facility]   8/2/2018 10:30 AM THE [facility] FAST TRACK NURSE MARYCRUZ Fernandez 417 THE [facility]   8/3/2018 11:00 AM Lynne Prader, PT [facility]   8/16/2018 10:00 AM THE [facility] FAST TRACK NURSE MARYCRUZ Fernandez 417 THE [facility]   8/30/2018 10:30 AM THE [facility] FAST TRACK NURSE MARYCRUZ Fernandez 417 THE [facility]

## 2018-07-23 ENCOUNTER — HOSPITAL ENCOUNTER (OUTPATIENT)
Dept: PHYSICAL THERAPY | Age: 68
Discharge: HOME | End: 2018-07-23
Payer: MEDICARE

## 2018-07-23 PROCEDURE — 97140 MANUAL THERAPY 1/> REGIONS: CPT | Performed by: PHYSICAL THERAPIST

## 2018-07-23 PROCEDURE — 97110 THERAPEUTIC EXERCISES: CPT | Performed by: PHYSICAL THERAPIST

## 2018-07-27 ENCOUNTER — HOSPITAL ENCOUNTER (OUTPATIENT)
Dept: PHYSICAL THERAPY | Age: 68
Discharge: HOME | End: 2018-07-27
Payer: MEDICARE

## 2018-07-27 PROCEDURE — 97140 MANUAL THERAPY 1/> REGIONS: CPT | Performed by: PHYSICAL THERAPIST

## 2018-07-27 PROCEDURE — 97110 THERAPEUTIC EXERCISES: CPT | Performed by: PHYSICAL THERAPIST

## 2018-07-27 NOTE — PROGRESS NOTES
PT DAILY TREATMENT NOTE - [facility]     Patient Name: Bruce Pedraza  Date:2018  : 1950  [x]  Patient  Verified  Payor: VA MEDICARE / Plan: VA MEDICARE PART A & B / Product Type: Medicare /    In time:9:30  Out time:10:30  Total Treatment Time (min): 60  Total Timed Codes (min): 60  1:1 Treatment Time ([address] only): 39   Visit #: 5 of 8    Treatment Area: Low back pain [M54.5]    SUBJECTIVE  Pain Level (0-10 scale): 0/10  Any medication changes, allergies to medications, adverse drug reactions, diagnosis change, or new procedure performed?: [x] No    [] Yes (see summary sheet for update)  Subjective functional status/changes:   [] No changes reported  Patient reports that his hernia repair is impeding his ability to bend and lift and carry objects. He notes that the posterior thigh pain has improved and is less than it was earlier this week. He states he has been able to run 2 miles. The only trigger seems to be sitting and driving in his car. OBJECTIVE      50 min Therapeutic Exercise:  [] See flow sheet :   Rationale: increase ROM, increase strength and improve coordination to improve the patients ability to increase their functional activity level. 10 min Manual Therapy:  Manual stretching HS, KTC, LTR   Rationale: increase ROM and increase tissue extensibility to increase ease of motion to improve function. With   [] TE   [] TA   [] neuro   [] other: Patient Education: [x] Review HEP    [] Progressed/Changed HEP based on:   [] positioning   [] body mechanics   [] transfers   [] heat/ice application    [] other:      Other Objective/Functional Measures:   Tight HS bilaterally with SLR to 85 degrees. Gait is without deviation. Pain Level (0-10 scale) post treatment: 0/10    ASSESSMENT/Changes in Function: Patient with decreased pain and able to run up to 2 miles.     Patient will continue to benefit from skilled PT services to modify and progress therapeutic interventions, address functional mobility deficits and analyze and modify body mechanics/ergonomics to attain remaining goals. [x]  See Plan of Care  []  See progress note/recertification  []  See Discharge Summary         Progress towards goals / Updated goals:  1. Patient's pain level will be 0-3/10 with activity in order to improve patient's ability to perform normal ADLs. Evaluation:  0/10-10/10  Current:  0-/10. 7/27/18  2. Patient will be able to lift and carry grocery bags with no limitations in order to return to his normal ADLs. Evaluation:  Limited in lifting and carrying grocery bags. Current:  Unable to bend and lift a watermelon from a chair but this was restricted by his recent hernia repair. 7/27/18  3. Patient will increase FOTO score to 85 pts to increase functional mobility. Evaluation:  83.   Current:  94. 7/27/18    PLAN  [x]  Upgrade activities as tolerated     [x]  Continue plan of care  []  Update interventions per flow sheet       []  Discharge due to:_  []  Other:_      Khari Dutta PT 7/27/2018  10:29 AM    Future Appointments  Date Time Provider Chad Pérez   7/30/2018 3:00 PM Khari Dutta PT [facility]   8/2/2018 10:30 AM THE [facility] FAST TRACK NURSE MARYCRUZ Fernandez 417 THE [facility]   8/3/2018 11:00 AM Khari Dutta PT [facility]   8/16/2018 10:00 AM THE [facility] FAST TRACK NURSE MARYCRUZ Fernandez 417 THE [facility]   8/30/2018 10:30 AM THE [facility] FAST TRACK NURSE MARYCRUZ Fernandez 417 THE [facility]

## 2018-07-30 ENCOUNTER — HOSPITAL ENCOUNTER (OUTPATIENT)
Dept: PHYSICAL THERAPY | Age: 68
Discharge: HOME | End: 2018-07-30
Payer: MEDICARE

## 2018-07-30 PROCEDURE — 97110 THERAPEUTIC EXERCISES: CPT | Performed by: PHYSICAL THERAPIST

## 2018-07-30 PROCEDURE — 97140 MANUAL THERAPY 1/> REGIONS: CPT | Performed by: PHYSICAL THERAPIST

## 2018-07-30 NOTE — PROGRESS NOTES
PT DAILY TREATMENT NOTE - [facility]     Patient Name: Linnette Castellanos  Date:2018  : 1950  [x]  Patient  Verified  Payor: Keon Ramos / Plan: VA MEDICARE PART A & B / Product Type: Medicare /    In time:3:05  Out time:4:10  Total Treatment Time (min): 65  Total Timed Codes (min): 65  1:1 Treatment Time ( [address] only): 54   Visit #: 6 of 8    Treatment Area: Low back pain [M54.5]    SUBJECTIVE  Pain Level (0-10 scale): 3-4/10  Any medication changes, allergies to medications, adverse drug reactions, diagnosis change, or new procedure performed?: [x] No    [] Yes (see summary sheet for update)  Subjective functional status/changes:   [] No changes reported  Patient states he was pain free over the weekend but he awoke with pain today and it did not get much better as the day progressed. Pain is located in the posterior thigh and buttock. He denies any lower back pain. OBJECTIVE    55 min Therapeutic Exercise:  [] See flow sheet :   Rationale: increase ROM, increase strength and improve coordination to improve the patients ability to increase ease of motion to improve function. 10 min Manual Therapy:  Manual stretching HS, KTC, LTR   Rationale: increase ROM and increase tissue extensibility to increase ease of motion to improve function. With   [] TE   [] TA   [] neuro   [] other: Patient Education: [x] Review HEP    [] Progressed/Changed HEP based on:   [] positioning   [] body mechanics   [] transfers   [] heat/ice application    [] other:      Other Objective/Functional Measures: Patient did note some right medial HS tenderness to palpation. He also noted pain with resisted right knee flexion. Gait is without deviation. Pain Level (0-10 scale) post treatment: 0-1/10    ASSESSMENT/Changes in Function: Patient continues with right posterior thigh pain.     Patient will continue to benefit from skilled PT services to modify and progress therapeutic interventions, address functional mobility deficits and analyze and modify body mechanics/ergonomics to attain remaining goals. [x]  See Plan of Care  []  See progress note/recertification  []  See Discharge Summary         Progress towards goals / Updated goals:  1. Patient's pain level will be 0-3/10 with activity in order to improve patient's ability to perform normal ADLs. Evaluation:  0/10-10/10  Current:  0-/10. 7/27/18  2. Patient will be able to lift and carry grocery bags with no limitations in order to return to his normal ADLs. Evaluation:  Limited in lifting and carrying grocery bags. Current:  Unable to bend and lift a watermelon from a chair but this was restricted by his recent hernia repair. 7/27/18  3. Patient will increase FOTO score to 85 pts to increase functional mobility. Evaluation:  83.   Current:  94. 7/27/18    PLAN  [x]  Upgrade activities as tolerated     [x]  Continue plan of care  []  Update interventions per flow sheet       []  Discharge due to:_  []  Other:_      Verlinda Lefort, PT 7/30/2018  3:28 PM    Future Appointments  Date Time Provider Chad Pérez   8/2/2018 10:30 AM THE [facility] FAST TRACK NURSE MARYCRUZ Fernandez 417 THE [facility]   8/3/2018 11:00 AM Verlinda Lefort, PT [facility] [address]   8/16/2018 10:00 AM THE [facility] FAST TRACK NURSE MARYCRUZ Fernandez 417 THE [facility]   8/30/2018 10:30 AM THE [facility] FAST TRACK NURSE MARYCRUZ Fernandez 417 THE [facility]

## 2018-08-02 ENCOUNTER — HOSPITAL ENCOUNTER (OUTPATIENT)
Dept: INFUSION THERAPY | Age: 68
Discharge: HOME | End: 2018-08-02
Payer: MEDICARE

## 2018-08-02 VITALS
OXYGEN SATURATION: 98 % | RESPIRATION RATE: 18 BRPM | TEMPERATURE: 97.6 F | HEART RATE: 94 BPM | SYSTOLIC BLOOD PRESSURE: 128 MMHG | DIASTOLIC BLOOD PRESSURE: 83 MMHG

## 2018-08-02 PROCEDURE — 74011250636 HC RX REV CODE- 250/636: Performed by: INTERNAL MEDICINE

## 2018-08-02 PROCEDURE — 96372 THER/PROPH/DIAG INJ SC/IM: CPT

## 2018-08-02 RX ADMIN — OMALIZUMAB 150 MG: 202.5 INJECTION, SOLUTION SUBCUTANEOUS at 11:24

## 2018-08-02 RX ADMIN — OMALIZUMAB 150 MG: 202.5 INJECTION, SOLUTION SUBCUTANEOUS at 11:26

## 2018-08-02 NOTE — PROGRESS NOTES
[facility] Progress Note Date: 2018 Name: Felicia Farmer MRN: [id number] : 1950 Mr. Davon Yost arrived to [facility] at 1120. Mr. Davon Yost was assessed and education was provided. Patient denied any complications today. Mr. Madisyn Hylton vitals were reviewed. Visit Vitals  /83 (BP 1 Location: Left arm, BP Patient Position: Sitting)  Pulse 94  Temp 97.6 °F (36.4 °C)  Resp 18  SpO2 98% Xolair 150 mg was administered as ordered SQ in patient's Left upper arm. Xolair 150 mg was administered as ordered SQ in patient's right upper arm. Mr. Davon Yost tolerated well without complaints. Mr. Davon Yost was discharged from [place] [address] in stable condition at 1130. He is to return on 2018 at 1000 for his next appointment. Marissa Hughes RN 2018

## 2018-08-03 ENCOUNTER — HOSPITAL ENCOUNTER (OUTPATIENT)
Dept: PHYSICAL THERAPY | Age: 68
Discharge: HOME | End: 2018-08-03
Payer: MEDICARE

## 2018-08-03 ENCOUNTER — OFFICE VISIT (OUTPATIENT)
Dept: FAMILY MEDICINE CLINIC | Age: 68
End: 2018-08-03

## 2018-08-03 VITALS
HEIGHT: 70 IN | WEIGHT: 160.2 LBS | SYSTOLIC BLOOD PRESSURE: 119 MMHG | DIASTOLIC BLOOD PRESSURE: 76 MMHG | TEMPERATURE: 97.8 F | BODY MASS INDEX: 22.94 KG/M2 | RESPIRATION RATE: 12 BRPM | OXYGEN SATURATION: 99 % | HEART RATE: 72 BPM

## 2018-08-03 DIAGNOSIS — M47.27 LUMBOSACRAL RADICULOPATHY DUE TO DEGENERATIVE JOINT DISEASE OF SPINE: Primary | ICD-10-CM

## 2018-08-03 PROCEDURE — 97112 NEUROMUSCULAR REEDUCATION: CPT | Performed by: PHYSICAL THERAPIST

## 2018-08-03 PROCEDURE — 97140 MANUAL THERAPY 1/> REGIONS: CPT | Performed by: PHYSICAL THERAPIST

## 2018-08-03 PROCEDURE — 97110 THERAPEUTIC EXERCISES: CPT | Performed by: PHYSICAL THERAPIST

## 2018-08-03 RX ORDER — METHYLPREDNISOLONE 4 MG/1
TABLET ORAL
Qty: 1 DOSE PACK | Refills: 0 | Status: SHIPPED | OUTPATIENT
Start: 2018-08-03 | End: 2018-09-28 | Stop reason: ALTCHOICE

## 2018-08-03 NOTE — PROGRESS NOTES
Chief Complaint Patient presents with  Follow-up  
  hamstring/ pt Pt preferred language for health care discussion is english. Is someone accompanying this pt? no 
 
Is the patient using any DME equipment during OV? no 
 
Depression Screening: PHQ over the last two weeks 8/3/2018 7/10/2018 4/5/2018 3/20/2018 6/29/2017 4/4/2017 2/9/2016 Little interest or pleasure in doing things Not at all Not at all Not at all Not at all Not at all Several days Nearly every day Feeling down, depressed, irritable, or hopeless Not at all Not at all Not at all Not at all Not at all Several days Nearly every day Total Score PHQ 2 0 0 0 0 0 2 6 Trouble falling or staying asleep, or sleeping too much - - - - - - Nearly every day Feeling tired or having little energy - - - - - - Nearly every day Poor appetite, weight loss, or overeating - - - - - - Nearly every day Feeling bad about yourself - or that you are a failure or have let yourself or your family down - - - - - - Nearly every day Trouble concentrating on things such as school, work, reading, or watching TV - - - - - - Not at all Moving or speaking so slowly that other people could have noticed; or the opposite being so fidgety that others notice - - - - - - Not at all Thoughts of being better off dead, or hurting yourself in some way - - - - - - Nearly every day PHQ 9 Score - - - - - - 21 How difficult have these problems made it for you to do your work, take care of your home and get along with others - - - - - - Somewhat difficult Learning Assessment: 
Learning Assessment 4/8/2015 1/31/2014 11/1/2013 11/28/2012 PRIMARY LEARNER Patient Patient Patient Patient HIGHEST LEVEL OF EDUCATION - PRIMARY LEARNER  - > 4 YEARS OF COLLEGE - -  
BARRIERS PRIMARY LEARNER - NONE - NONE  
CO-LEARNER CAREGIVER - No - - PRIMARY LANGUAGE ENGLISH ENGLISH ENGLISH ENGLISH  
LEARNER PREFERENCE PRIMARY DEMONSTRATION OTHER (COMMENT) READING VIDEOS  
 READING - - -  
ANSWERED BY patient Patient Patient patient RELATIONSHIP SELF SELF SELF - Fall Risk Fall Risk Assessment, last 12 mths 8/3/2018 7/10/2018 4/5/2018 3/20/2018 6/29/2017 4/4/2017 2/9/2016 Able to walk? Yes Yes Yes Yes Yes Yes Yes Fall in past 12 months? No No No No No No No  
 
 
 
Health Maintenance reviewed and discussed per provider. Pt is due for Health Maintenance Due Topic Date Due  
 ZOSTER VACCINE AGE 60>  06/02/2009  GLAUCOMA SCREENING Q2Y  12/01/2015  Influenza Age 5 to Adult  08/01/2018 Please order/place referral if appropriate. Advance Directive: 1. Do you have an advance directive in place? Patient Reply:no 2. If not, would you like material regarding how to put one in place? Patient Reply: no 
 
2. Per patient no changes to their ACP contact no. Coordination of Care: 1. Have you been to the ER, urgent care clinic since your last visit? Hospitalized since your last visit? no 
 
2. Have you seen or consulted any other health care providers outside of the [facility] since your last visit? Include any pap smears or colon screening. no 
 
Pharmacy verified Care Team verified and updated. Please see Red banners under Allergies, Med rec, Immunizations to remove outside inquires. All correct information has been verified with patient and added to chart.

## 2018-08-03 NOTE — PROGRESS NOTES
Keith Marks is a 70 y.o. male who was seen in clinic today (8/3/2018). Assessment & Plan: ICD-10-CM ICD-9-CM 1. Lumbosacral radiculopathy due to degenerative joint disease of spine M47.27 722.52 methylPREDNISolone (MEDROL DOSEPACK) 4 mg tablet Worsening Deference to hernia surgery prudent, but that was now approx 2 months ago Will coordinate with Leonarda Villa PT Short course oral steroids Follow-up Disposition: 
Return for PT plan to follow - call/MyChart if no word in 2 weeks. Mansoor Balderas I reviewed the case. PT HAS been directed to his lumbar spine, excluding traction pending feedback from Dr. Garth Lyles (surgeon). He'll attempt to reach him again. KJS Subjective:  
Keith Marks was seen today for Follow-up (hamstring/ pt) Follow-up DJD lumbar spine with radiculopathy recently seen 7/10/2018 with return to PT. Rosa Orf indicates all therapy has been directed to the hamstring, not the back. That there was a concern that traction may disrupt the hernia repair, for which he then intended to communicate with the surgeon prior to proceeding. Meanwhile, overall his symptoms are worse: 
 
Sometimes the pain is \"much worse\" extending all the way across the glutes and radiating down to the calf. \"the longer I drive the worse it gets\" Results for orders placed or performed during the hospital encounter of 04/05/18 PSA SCREENING (SCREENING) Result Value Ref Range Prostate Specific Ag 2.3 0.0 - 4.0 ng/mL Outpatient Prescriptions Marked as Taking for the 8/3/18 encounter (Office Visit) with Frida Bansal MD  
Medication Sig Dispense Refill  omalizumab (XOLAIR) 150 mg solr by SubCUTAneous route once.  montelukast (SINGULAIR) 10 mg tablet Take 1 Tab by mouth daily. 90 Tab 4  cetirizine-psuedoePHEDrine (ZYRTEC-D) 5-120 mg per tablet Take 1 Tab by mouth daily. 90 Tab 4  
 Omeprazole delayed release (PRILOSEC D/R) 20 mg tablet Take 1 Tab by mouth daily. 90 Tab 4  
 fluticasone-salmeterol (ADVAIR) 500-50 mcg/dose diskus inhaler Take 1 Puff by inhalation every twelve (12) hours. 3 Each 1  
 levalbuterol tartrate (XOPENEX) 45 mcg/actuation inhaler Take 2 Puffs by inhalation every four (4) hours as needed for Wheezing. 1 Inhaler 4  
 levalbuterol (XOPENEX) 1.25 mg/3 mL nebu 3 mL by Nebulization route every six (6) hours as needed. 1 Package 2  
 aspirin 81 mg chewable tablet Take 1 Tab by mouth daily. 90 Tab 4  
 albuterol (PROVENTIL VENTOLIN) 2.5 mg /3 mL (0.083 %) nebulizer solution 3 mL by Nebulization route every four (4) hours as needed for Wheezing. 1 Package 1  
 FA/MV-MN/MIN AA JANETT/SAW PAL (SAW PALMETO-MULTIVIT-MIN-AA-FA PO) Take 1 Tab by mouth daily.  Cholecalciferol, Vitamin D3, (VITAMIN D) 2,000 unit Cap Take 1 Cap by mouth daily. 30 Cap 11 Patient Active Problem List  
 Diagnosis  Hyperlipidemia 3/6/2016: ASCVD 10 year risk 14.9 %. Lifetime risk NA . Statin initiated.  Diastolic dysfunction 2/29/2016: inpatient echo (chest pain) EF 49-58% diastolic dysfunction I. Dr. Nikia German. CCB initiated 3/6/2016. Per Dr. Akiko Pedersen (pulm) re comorbid asthma/COPD 9/8/2017, avoid ACE I. Favor BB and ARB.  Essential hypertension 3/6/2016: ASCVD 10 year risk 14.9 %. Lifetime risk NA. Aspirin initiated. Switched from thiazide to CCB for context of diastolic dysfunction.  Elevated blood pressure  Elevated PSA  BPH with obstruction/lower urinary tract symptoms  Family history of prostate cancer  Lumbosacral radiculopathy due to degenerative joint disease of spine  Cervical radiculopathy due to degenerative joint disease of spine  Allergic rhinitis  Moderate persistent asthma without complication 5/7/2015: PFT consistent with Obstructive airway disease mild in nature. Overall looks like COPD and emphysema but as non smoker 
possibility of Asthma with airway remodeling is there.  Cough  Vitamin D deficiency Allergies Allergen Reactions  Latex Hives and Itching  Cashew Nut Other (comments) \"UPSET STOMACH\"  Milk Diarrhea  Other Medication Other (comments) Pt allergic to cats with respiratory, skin reactions. Pt allergic to cashews with upset stomach  Vicodin [Hydrocodone-Acetaminophen] Other (comments)  
  hallucinations Patient Care Team: 
Juli Oakley MD as PCP - [facility]) Juli Oakley MD (Family Practice) Andrea Perry MD (Pulmonary Disease) Hal Coronado MD as Surgeon (Surgery) The following sections were reviewed & updated as appropriate: PMH, PSH, FH, and SH. ROS - per HPI Objective:  
 
Visit Vitals  /76 (BP 1 Location: Left arm, BP Patient Position: Sitting)  Pulse 72  Temp 97.8 °F (36.6 °C) (Oral)  Resp 12  Ht 5' 10\" (1.778 m)  Wt 160 lb 3.2 oz (72.7 kg)  SpO2 99%  BMI 22.99 kg/m2 Physical Exam  
Constitutional: He is oriented to person, place, and time. He appears well-developed. No distress. HENT:  
Head: Normocephalic and atraumatic. Pulmonary/Chest: Effort normal.  
Neurological: He is alert and oriented to person, place, and time. Psychiatric: He has a normal mood and affect. His behavior is normal. Judgment and thought content normal.  
 
 
 
Disclaimer: The patient understands our medical plan. Alternatives have been explained and offered. The risks, benefits and significant side effects of all medications have been reviewed. Anticipated time course and progression of condition reviewed. All questions have been addressed. He is encouraged to employ the information provided in the after visit summary, which was reviewed. Where appropriate, he is instructed to call the clinic if he has not been notified either by phone or through [address] with the results of his tests or with an appointment plan for any referrals within 1 week(s).   No news is not good news; it's no news. The patient  is to call if his condition worsens or fails to improve or if significant side effects are experienced. Aspects of this note may have been generated using voice recognition software. Despite editing, there may be unrecognized errors.   
 
 
Maria Del Rosario Galvez MD

## 2018-08-03 NOTE — PROGRESS NOTES
PT DAILY TREATMENT NOTE - [facility]     Patient Name: Amy Cortez  Date:8/3/2018  : 1950  [x]  Patient  Verified  Payor: Aisha Ku / Plan: [place] MED[place] PART A & B / Product Type: Medicare /    In time:11:05  Out time:12:10  Total Treatment Time (min): 65  Total Timed Codes (min): 65  1:1 Treatment Time ([address] only): 65   Visit #: 7 of 8      Treatment Area: Low back pain [M54.5]    SUBJECTIVE  Pain Level (0-10 scale): 1-210  Any medication changes, allergies to medications, adverse drug reactions, diagnosis change, or new procedure performed?: [x] No    [] Yes (see summary sheet for update)  Subjective functional status/changes:   [] No changes reported  Patient continues to note the pain in the posterior thigh/hamstring on the right. He denies pain in the lower back. His pain is worse when driving. OBJECTIVE    35 min Therapeutic Exercise:  [] See flow sheet :   Rationale: increase ROM and increase strength to improve the patients ability to increase their functional activity level. 20 min Neuromuscular Re-education:  []  See flow sheet :   Rationale: increase strength, improve coordination and increase proprioception  to improve the patients ability to increase core strength to improve lumbar stability. 10 min Manual Therapy:  Manual stretching HS, KTC, LTR   Rationale: increase ROM and increase tissue extensibility to increase ease of motion to improve function. With   [] TE   [] TA   [] neuro   [] other: Patient Education: [x] Review HEP    [] Progressed/Changed HEP based on:   [] positioning   [] body mechanics   [] transfers   [] heat/ice application    [] other:      Other Objective/Functional Measures: Patient gait is without deviation. He has mild HS tightness bilaterally. Patient demonstrates good lumbar/abdominal stabilization. Spoke with Hawa Grimes at Dr. Benny Medina office.   She consulted one of the surgeons regarding appropriate application of lumbar traction and it was felt that it should not be done and that Mr. Elaine Ren see Dr. Serafin Beckett to discuss this matter. Pain Level (0-10 scale) post treatment:  0-1/10    ASSESSMENT/Changes in Function: Patient with good function however, he still has right posterior thigh pain when driving/riding in a car. Patient will continue to benefit from skilled PT services to modify and progress therapeutic interventions, address functional mobility deficits and analyze and modify body mechanics/ergonomics to attain remaining goals. [x]  See Plan of Care  []  See progress note/recertification  []  See Discharge Summary         Progress towards goals / Updated goals:  1. Patient's pain level will be 0-3/10 with activity in order to improve patient's ability to perform normal ADLs. Evaluation:  0/10-10/10  Current:  0-/10. 7/27/18  2. Patient will be able to lift and carry grocery bags with no limitations in order to return to his normal ADLs. Evaluation:  Limited in lifting and carrying grocery bags. Current:  Unable to bend and lift a watermelon from a chair but this was restricted by his recent hernia repair. 7/27/18  3. Patient will increase FOTO score to 85 pts to increase functional mobility. Evaluation:  83.   Current:  94. 7/27/18    PLAN  [x]  Upgrade activities as tolerated     [x]  Continue plan of care  []  Update interventions per flow sheet       []  Discharge due to:_  []  Other:_      Miguelina Sweeney, PT 8/3/2018  11:26 AM    Future Appointments  Date Time Provider Chad Pérez   8/16/2018 10:00 AM [address] [facility]   8/30/2018 10:30 AM [facility] FAST TRACK NURSE MARYCRUZ Fernandez [address] [facility]   9/13/2018 10:00 AM [facility] FAST TRACK NURSE MARYCRUZ [address] [facility]   9/27/2018 10:00 AM [facility] FAST TRACK NURSE MARYCRUZ [address] [facility]

## 2018-08-03 NOTE — Clinical Note
Raheem Arora, I need to speak with Alanis Jerez about the plan for this patient either today or early next week. Thank you.  MARE

## 2018-08-03 NOTE — MR AVS SNAPSHOT
[facility] [address] [place] [address] 
[phone] Patient: Amy Cortez MRN: [id number] LPB:[id number] Visit Information Date & Time Provider Department Dept. Phone Encounter #  
 8/3/2018 10:30 AM Laron Campos MD [address] [phone] [id number] Follow-up Instructions Return for PT plan to follow - call/MyChart if no word in 2 weeks. Upcoming Health Maintenance Date Due ZOSTER VACCINE AGE 60> 6/2/2009 GLAUCOMA SCREENING Q2Y 12/1/2015 Influenza Age 5 to Adult 8/1/2018 MEDICARE YEARLY EXAM 4/6/2019 COLONOSCOPY 6/18/2020 DTaP/Tdap/Td series (2 - Td) 9/9/2020 Allergies as of 8/3/2018  Review Complete On: 8/3/2018 By: Laron Campos MD  
  
 Severity Noted Reaction Type Reactions Latex  04/08/2015    Hives, Itching Cashew Nut  01/07/2016    Other (comments) \"UPSET STOMACH\" Milk  04/11/2018    Diarrhea Other Medication  01/23/2013    Other (comments) Pt allergic to cats with respiratory, skin reactions. Pt allergic to cashews with upset stomach Vicodin [Hydrocodone-acetaminophen]  05/15/2014    Other (comments)  
 hallucinations Current Immunizations  Reviewed on 6/14/2018 Name Date Influenza Vaccine 11/1/2013 Pneumococcal Conjugate (PCV-13) 2/9/2016 Pneumococcal Polysaccharide (PPSV-23) 6/14/2018, 5/8/2013 TDAP Vaccine 9/9/2010 Not reviewed this visit You Were Diagnosed With   
  
 Codes Comments Lumbosacral radiculopathy due to degenerative joint disease of spine    -  Primary ICD-10-CM: M47.27 ICD-9-CM: 722.52 Vitals BP Pulse Temp Resp Height(growth percentile) Weight(growth percentile) 119/76 (BP 1 Location: Left arm, BP Patient Position: Sitting) 72 97.8 °F (36.6 °C) (Oral) 12 5' 10\" (1.778 m) 160 lb 3.2 oz (72.7 kg) SpO2 BMI Smoking Status 99% 22.99 kg/m2 Never Smoker BMI and BSA Data Body Mass Index Body Surface Area  
 22.99 kg/m 2 1.89 m 2 Preferred Pharmacy Pharmacy Name Phone [facility] [address] [facility], [address] [place] [address] Rd [phone] Your Updated Medication List  
  
   
This list is accurate as of 8/3/18 11:06 AM.  Always use your most recent med list.  
  
  
  
  
 albuterol 2.5 mg /3 mL (0.083 %) nebulizer solution Commonly known as:  PROVENTIL VENTOLIN  
3 mL by Nebulization route every four (4) hours as needed for Wheezing. aspirin 81 mg chewable tablet Take 1 Tab by mouth daily. cetirizine 10 mg tablet Commonly known as:  ZYRTEC Take 1 Tab by mouth. cetirizine-psuedoePHEDrine 5-120 mg per tablet Commonly known as:  ZyrTEC-D Take 1 Tab by mouth daily. Cholecalciferol (Vitamin D3) 2,000 unit Cap capsule Commonly known as:  VITAMIN D Take 1 Cap by mouth daily. fluticasone-salmeterol 500-50 mcg/dose diskus inhaler Commonly known as:  ADVAIR Take 1 Puff by inhalation every twelve (12) hours. levalbuterol 1.25 mg/3 mL Nebu Commonly known as:  XOPENEX  
3 mL by Nebulization route every six (6) hours as needed. levalbuterol tartrate 45 mcg/actuation inhaler Commonly known as:  Tamara Lisa Take 2 Puffs by inhalation every four (4) hours as needed for Wheezing. methylPREDNISolone 4 mg tablet Commonly known as:  Kareem Fling Use according to package instructions  
  
 montelukast 10 mg tablet Commonly known as:  SINGULAIR Take 1 Tab by mouth daily. * omeprazole 40 mg capsule Commonly known as:  PRILOSEC Take 1 Cap by mouth. * Omeprazole delayed release 20 mg tablet Commonly known as:  PRILOSEC D/R Take 1 Tab by mouth daily. SAW RAHUL-MULTIVIT-MIN-AA-FA PO Take 1 Tab by mouth daily. XOLAIR 150 mg Solr Generic drug:  omalizumab  
by SubCUTAneous route once. * Notice: This list has 2 medication(s) that are the same as other medications prescribed for you. Read the directions carefully, and ask your doctor or other care provider to review them with you. Prescriptions Sent to Pharmacy Refills  
 methylPREDNISolone (MEDROL DOSEPACK) 4 mg tablet 0 Sig: Use according to package instructions Class: Normal  
 Pharmacy: [address] [place] [address], [address] [place] [address] #: [phone] Follow-up Instructions Return for PT plan to follow - call/MyChart if no word in 2 weeks. To-Do List   
 08/16/2018 10:00 AM  
  Appointment with [facility] FAST TRACK NURSE at [facility] [address] [facility] ([phone])  
  
 08/30/2018 10:30 AM  
  Appointment with [facility] FAST TRACK NURSE at [facility] [address] [facility] ([phone])  
  
 09/13/2018 10:00 AM  
  Appointment with Brain Post at [facility] [address] [facility] ([phone])  
  
 09/27/2018 10:00 AM  
  Appointment with [facility] FAST TRACK NURSE at [place] [address] [facility] ([phone]) hospitals & HEALTH SERVICES! Dear Sulema Radford: Thank you for requesting a [employer] account. Our records indicate that you already have an active [employer] account. You can access your account anytime at [email or web address] Did you know that you can access your hospital and ER discharge instructions at any time in [employer]? You can also review all of your test results from your hospital stay or ER visit. Additional Information If you have questions, please visit the Frequently Asked Questions section of the [employer] website at [email or web address] Remember, [employer] is NOT to be used for urgent needs. For medical emergencies, dial 911. Now available from your iPhone and Android! Please provide this summary of care documentation to your next provider. Your primary care clinician is listed as Sarah Ramos.  If you have any questions after today's visit, please call [phone].

## 2018-08-16 ENCOUNTER — HOSPITAL ENCOUNTER (OUTPATIENT)
Dept: INFUSION THERAPY | Age: 68
Discharge: HOME | End: 2018-08-16
Payer: MEDICARE

## 2018-08-16 VITALS
HEART RATE: 84 BPM | TEMPERATURE: 98.1 F | DIASTOLIC BLOOD PRESSURE: 83 MMHG | SYSTOLIC BLOOD PRESSURE: 135 MMHG | OXYGEN SATURATION: 98 % | RESPIRATION RATE: 18 BRPM

## 2018-08-16 PROCEDURE — 96372 THER/PROPH/DIAG INJ SC/IM: CPT

## 2018-08-16 PROCEDURE — 74011250636 HC RX REV CODE- 250/636: Performed by: INTERNAL MEDICINE

## 2018-08-16 RX ADMIN — OMALIZUMAB 150 MG: 202.5 INJECTION, SOLUTION SUBCUTANEOUS at 10:52

## 2018-08-16 RX ADMIN — OMALIZUMAB 150 MG: 202.5 INJECTION, SOLUTION SUBCUTANEOUS at 10:54

## 2018-08-16 NOTE — PROGRESS NOTES
[facility] Progress Note    Date: 2018    Name: Sergey Hein              MRN: [id number]              : 1950    Xolair Injection       Mr. Palomo Peres was assessed and education was provided. Mr. Palomo Peres denies any issues or complaints. Mr. Marquise Ya vitals were reviewed. Visit Vitals    /83 (BP 1 Location: Left arm, BP Patient Position: Sitting)    Pulse 84    Temp 98.1 °F (36.7 °C)    Resp 18    SpO2 98%       Xolair 300 mg SQ given in two equally divided injections. Xolair 150 mg SQ given in patient's right upper arm and band aid applied. Xolair 150 mg SQ given in patient's left upper arm and band aid applied to site. Mr. Palomo Peres tolerated injections, and had no complaints at this time. Mr. Palomo Peres was discharged from [place] [address] in stable condition at 1055. He is to return on 18 at 10:30 for his next appointment. Armband removed and shredded.        Nelda Jiménez RN  2018  2:10 PM

## 2018-08-30 ENCOUNTER — HOSPITAL ENCOUNTER (OUTPATIENT)
Dept: INFUSION THERAPY | Age: 68
Discharge: HOME | End: 2018-08-30
Payer: MEDICARE

## 2018-08-30 VITALS
DIASTOLIC BLOOD PRESSURE: 85 MMHG | SYSTOLIC BLOOD PRESSURE: 123 MMHG | OXYGEN SATURATION: 98 % | RESPIRATION RATE: 18 BRPM | HEART RATE: 100 BPM | TEMPERATURE: 98.1 F

## 2018-08-30 PROCEDURE — 96372 THER/PROPH/DIAG INJ SC/IM: CPT

## 2018-08-30 PROCEDURE — 74011250636 HC RX REV CODE- 250/636: Performed by: INTERNAL MEDICINE

## 2018-08-30 RX ADMIN — OMALIZUMAB 150 MG: 202.5 INJECTION, SOLUTION SUBCUTANEOUS at 14:28

## 2018-08-30 RX ADMIN — OMALIZUMAB 150 MG: 202.5 INJECTION, SOLUTION SUBCUTANEOUS at 14:27

## 2018-08-30 NOTE — PROGRESS NOTES
[facility] Progress Note Date: 2018 Name: Dinah Mason MRN: [id number] : 1950 Xolair Injection Mr. Rolo Buchanan was assessed and education was provided. Mr. Rolo Buchanan denies any concerns or complaints. Mr. Fatmata Caldwell vitals were reviewed. Visit Vitals  /85 (BP 1 Location: Left arm, BP Patient Position: Sitting)  Pulse 100  Temp 98.1 °F (36.7 °C)  Resp 18  SpO2 98% Xolair 300 mg SQ given in two equally divided injections. 
  
Xolair 150 mg SQ given in patient's right upper arm and band aid applied. 
  
Xolair 150 mg SQ given in patient's left upper arm and band aid applied to site. 
  
Mr. Rey tolerated injections, and had no complaints at this time. Mr. Rolo Buchanan tolerated injections and had no complaints at this time. Mr. Rolo Buchanan was discharged from [place] [address] in stable condition at 1435. He is to return on 18 at 1000 for his next appointment. Armband removed and shredded. Elen Hatfield RN 2018 
2:34 PM

## 2018-09-03 NOTE — PROGRESS NOTES
[facility] at [address] [facility] [facility], [address] [place] [address]  Phone: [phone]      Fax:  [phone]    Discharge Summary    Patient name: Katlyn Jackson Start of Care: 2018   Referral source: Vladimir Collazo MD : 1950                         Medical Diagnosis: Low back pain [M54.5] Onset Date:18                         Treatment Diagnosis: L/S Radiculopathy with right hamstring pain   Prior Hospitalization: see medical history Provider#: [id number]   Medications: Verified on Patient summary List    Comorbidities: Hernia repair on 18, History of back problems, Arthritis. Prior Level of Function: Running on Treadmill 2-3 miles, 3-4x/week, weight lifting        Visits from Start of Care: 7    Missed Visits: 0    Reporting Period : 18 to 8/3/18       Progress towards goals / Updated goals:  1. Patient's pain level will be 0-3/10 with activity in order to improve patient's ability to perform normal ADLs. Evaluation:  0/10-10/10  Current:  Met 0-/10. 18  2. Patient will be able to lift and carry grocery bags with no limitations in order to return to his normal ADLs. Evaluation:  Limited in lifting and carrying grocery bags. Current:  Progressing Unable to bend and lift a watermelon from a chair but this was restricted by his recent hernia repair. 18  3. Patient will increase FOTO score to 85 pts to increase functional mobility. Evaluation:  83. Current:  Met 94. 18      Assessment/ Summary of Care: Patient has shown good progress with this treatment program. Pain as of last visit was 1-2/10. Patient has shown decreased pain and increased strength, stability and mobility. Patient reports continued pain in the posterior thigh/hamstring on the right but no pain reported in the low back. Pain is worse when driving or riding in a car. FOTO score is 94. All STG/LTGs achieved as identified above.         G-Codes (GP)  Mobility    Goal  CI= 1-19%  D/C  CI= 1-19%    The severity rating is based on clinical judgment and the FOTO score.     RECOMMENDATIONS:  [x]Discontinue therapy: []Patient has reached or is progressing toward set goals      []Patient is non-compliant or has abdicated      []Due to lack of appreciable progress towards set goals      X Placed on hold awaiting MD assessment, did not return           Lauren Mobley, PT 9/3/2018 10:37 AM

## 2018-09-12 ENCOUNTER — DOCUMENTATION ONLY (OUTPATIENT)
Dept: PULMONOLOGY | Age: 68
End: 2018-09-12

## 2018-09-12 NOTE — PROGRESS NOTES
[address] [employer]  [address]  Tel: [phone]  Fax: [phone]    PATIENT:       Jorge Medrano   YOB: 1950  DATE:       09/12/2018 11:30 AM  VISIT TYPE:       Office Visit      Assessment/Plan  # Detail Type Description    1. Assessment Cough (R05). Impression -Ongoing cough  -Marked improvement in symptoms with Advair/Singulair suggest possibly of cough variant asthma  -GERD, postnasal drip contributing  -Currently follows with ENT specialist also      Plan:  -We will consider further workup in related to difficult to control asthma including HSP panel IgE level CBC to look for eosinophils and ANCA levels  3/19/2018:  -PFT with FEV1 of 2.25 or 67% predicted and FEV1 to FVC ratio 53 unchanged from prior PFT  --Allergen profile September 2017 showed multiple allergen positive  -BRENDA positive  -SS a greater than 8  -Aspergillus antibody negative  -ANCA negative  -CRP 15.71  -RF 29.40  -IgE level 324  -Started on Xolair since January marked improvement in symptoms has not used rescue inhaler at all  -Clinically doing very well symptoms are very well controlled with start of Xolair    9/12/2018:  -Came for follow-up  -Overall doing very well, denies any cough wheezing chest pain or shortness of breath  -As per the patient after start of Xolair marked improvement in symptoms  -No history of any exacerbation or emergency room visit  -Not using Restoril correct all    Plan:  -As patient is symptom-free for last 6 months will consider stepping down therapy  -Decrease Advair 250/50 continue Singulair and albuterol  -Follow-up in 3-4 months  -Flu shot with PCPs office. 2. Assessment Unspecified asthma, uncomplicated (T67.627).     Impression -As above  -Asthma action plan discussed kit given to the patient  -Continue close monitoring will follow-up one more time in 3 months as patient has more symptoms during the fall and springtime    3/19/2018:  -Asthma symptoms are very well controlled with start of Xolair  -Follow-up in 6 months sooner if anything changes  -To date with flu shot asthma action plan discussed    September 2018: Stepping on therapy  -Consider repeating PFT March 2019.         3. Assessment Gastro-esophageal reflux disease without esophagitis (K21.9). Impression -Continue PPI. 4. Assessment Cardiomyopathy, unspecified type (I42.9). Impression -Echo in 2016 showed EF of 45-50%  -Stress test in 2016 was negative  -If patient continued to have symptoms of shortness of breath consider adding treatment for cardio myopathy including is ARB/beta blockers    Plan:  -Continue management per PCP  -If added management consider avoiding ACE and preferred ARB due to cough. .         5. 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 Impression -BRENDA positive, RF 29, S as a 8, ANCA negative, ACE negative    Plan:  -Continue close follow-up with rheumatology  -Notes suggest positive antibodies without symptoms and clinical observation is recommended. Assessment  -Severe persistent asthma with FEV1 of 2.25 or 67% predicted March 2018 PFT  -IgE level of 324-on Xolair  -Positive SSA, BRENDA, RF currently follows with rheumatology    Plan  -Continue Xolair, Advair, Singulair, albuterol-stepping on therapy Advair 250/50  -Follow-up in 3 months sooner if anything changes  -Consider repeating PFT March 2019  -Side effect profile of Xolair reviewed  -Plan of care discussed with the patient at length consider Ventura Kalata over next visit and philly 1 level if not done    I reviewed patient's prior studies available to me including sleep studies, compliance download, PFT if available, chest x-ray if available. I reviewed patient's medication and adjusted the medication. All the questions related to patient's management plan discussed and answered to patient's satisfaction.   I spent 25 minutes of my time, more than 50% of time spent on face-to-face evaluation, for diagnosis pathology and discussion of various treatment options patient understood the plan all questions were on start. Voice-recognition software may have been used to generate this report, which may have resulted in some phonetic-based errors in grammar and contents. Even though attempts were made to correct all the mistakes, some may have been missed, and remained in the body of the document.

## 2018-09-13 ENCOUNTER — HOSPITAL ENCOUNTER (OUTPATIENT)
Dept: INFUSION THERAPY | Age: 68
Discharge: HOME | End: 2018-09-13
Payer: MEDICARE

## 2018-09-13 VITALS
SYSTOLIC BLOOD PRESSURE: 132 MMHG | TEMPERATURE: 99.1 F | DIASTOLIC BLOOD PRESSURE: 72 MMHG | OXYGEN SATURATION: 100 % | RESPIRATION RATE: 18 BRPM | HEART RATE: 100 BPM

## 2018-09-13 PROBLEM — R76.8 OTHER SPECIFIED ABNORMAL IMMUNOLOGICAL FINDINGS IN SERUM: Status: ACTIVE | Noted: 2018-09-13

## 2018-09-13 PROCEDURE — 96372 THER/PROPH/DIAG INJ SC/IM: CPT

## 2018-09-13 PROCEDURE — 74011250636 HC RX REV CODE- 250/636: Performed by: INTERNAL MEDICINE

## 2018-09-13 RX ADMIN — OMALIZUMAB 150 MG: 202.5 INJECTION, SOLUTION SUBCUTANEOUS at 10:43

## 2018-09-13 RX ADMIN — OMALIZUMAB 150 MG: 202.5 INJECTION, SOLUTION SUBCUTANEOUS at 10:41

## 2018-09-13 NOTE — PROGRESS NOTES
[facility] Progress Note Date: 2018 Name: Miryam Barrientos MRN: [id number] : 1950 Xolair Mr. Laron Dandy was assessed and education was provided. Mr. Laron Dandy denies any issues or complaints. Mr. Richard Jeffries vitals were reviewed. Visit Vitals  /72 (BP 1 Location: Left arm, BP Patient Position: Sitting)  Pulse 100  Temp 99.1 °F (37.3 °C)  Resp 18  SpO2 100% Xolair 300 mg SQ given in two equally divided injections. 
   
Xolair 150 mg SQ given in patient's right upper arm and band aid applied. 
   
Xolair 150 mg SQ given in patient's left upper arm and band aid applied to site. 
   
Mr. Rey tolerated injections, and had no complaints at this time. 
  
  
Mr. Laron Dandy was discharged from [place] [address] in stable condition at 1045. He is to return on 18 at 1000 for his next appointment. Armband removed and shredded. Cyndy Skinner RN 2018 10:49 AM

## 2018-09-27 ENCOUNTER — HOSPITAL ENCOUNTER (OUTPATIENT)
Dept: INFUSION THERAPY | Age: 68
Discharge: HOME | End: 2018-09-27
Payer: MEDICARE

## 2018-09-27 VITALS
DIASTOLIC BLOOD PRESSURE: 71 MMHG | OXYGEN SATURATION: 98 % | HEART RATE: 90 BPM | RESPIRATION RATE: 18 BRPM | SYSTOLIC BLOOD PRESSURE: 118 MMHG | TEMPERATURE: 98.2 F

## 2018-09-27 PROCEDURE — 96372 THER/PROPH/DIAG INJ SC/IM: CPT

## 2018-09-27 PROCEDURE — 74011250636 HC RX REV CODE- 250/636: Performed by: INTERNAL MEDICINE

## 2018-09-27 RX ADMIN — OMALIZUMAB 150 MG: 202.5 INJECTION, SOLUTION SUBCUTANEOUS at 15:41

## 2018-09-27 RX ADMIN — OMALIZUMAB 150 MG: 202.5 INJECTION, SOLUTION SUBCUTANEOUS at 15:38

## 2018-09-27 NOTE — PROGRESS NOTES
[facility] Progress Note Date: 2018 Name: Dinah Mason MRN: [id number] : 1950 Mr. Rolo Buchanan arrived to [facility] [place]. Mr. Rolo Buchanan was assessed and education was provided. Mr. Fatmata Caldwell vitals were reviewed. Visit Vitals  /71 (BP 1 Location: Left arm)  Pulse 90  Temp 98.2 °F (36.8 °C)  Resp 18  SpO2 98% 150 mg xolair was administered as ordered SQ in patient's Left upper arm . 150 mg xolair was administered as ordered SQ in patient's right upper arm. Mr. Rolo Buchanan tolerated well without complaints. Mr. Rolo Buchanan was discharged from [place] [address] in stable condition at [phone]. He is to return on 10/11/18 at 1000 for his next appointment. Shakira Rosales RN 2018

## 2018-09-28 ENCOUNTER — OFFICE VISIT (OUTPATIENT)
Dept: FAMILY MEDICINE CLINIC | Age: 68
End: 2018-09-28

## 2018-09-28 VITALS
OXYGEN SATURATION: 97 % | HEART RATE: 83 BPM | DIASTOLIC BLOOD PRESSURE: 74 MMHG | SYSTOLIC BLOOD PRESSURE: 120 MMHG | HEIGHT: 70 IN | TEMPERATURE: 97.9 F | WEIGHT: 160 LBS | BODY MASS INDEX: 22.9 KG/M2 | RESPIRATION RATE: 14 BRPM

## 2018-09-28 DIAGNOSIS — L98.9 BENIGN SKIN LESION: Primary | ICD-10-CM

## 2018-09-28 DIAGNOSIS — D21.0 BENIGN NEOPLASM OF CONNECTIVE AND OTHER SOFT TISSUE OF HEAD, FACE AND NECK: ICD-10-CM

## 2018-09-28 NOTE — PROGRESS NOTES
Verenice Yi is a 70 y.o. male who was seen in clinic today (9/28/2018). Assessment & Plan:       ICD-10-CM ICD-9-CM    1. Benign skin lesion L98.9 709.9 DESTRUC BENIGN LESION, UP TO 14 LESIONS   2. 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 No intervention needed based in nature of condition but rather to address discomfort based on location. Requesting cryo as procedure well tolerated for similar lesions in the past. Reminded risks of discomfort, bleeding, infection, failure to resolve problem requiring r-etreatment or possible excision in the future, scarring. Understood with preference to proceed. Cryotherapy with liquid nitrogen was performed employing a freeze thaw freeze thaw cyclic technique. The procedure was well tolerated without complications and wound care instructions provided. Advised to be alert for any signs of cutaneous infection. Follow-up Disposition:  Return if symptoms worsen or fail to improve. Subjective:   Verenice Yi was seen today for Mole (on back of neck. States it showed up about 1 week ago. )      Uncomfortable new skin lesion behind left ear. No itching or bleeding. Similar prior lesions amenable to LN2  No history of skin cancer         Outpatient Prescriptions Marked as Taking for the 9/28/18 encounter (Office Visit) with Damaso Mancini MD   Medication Sig Dispense Refill    cetirizine (ZYRTEC) 10 mg tablet Take 1 Tab by mouth.  omalizumab (XOLAIR) 150 mg solr by SubCUTAneous route once.  montelukast (SINGULAIR) 10 mg tablet Take 1 Tab by mouth daily. 90 Tab 4    cetirizine-psuedoePHEDrine (ZYRTEC-D) 5-120 mg per tablet Take 1 Tab by mouth daily. 90 Tab 4    Omeprazole delayed release (PRILOSEC D/R) 20 mg tablet Take 1 Tab by mouth daily.  90 Tab 4    fluticasone-salmeterol (ADVAIR) 500-50 mcg/dose diskus inhaler Take 1 Puff by inhalation every twelve (12) hours. (Patient taking differently: Take 1 Puff by inhalation every twelve (12) hours. Changed to 250) 3 Each 1    levalbuterol tartrate (XOPENEX) 45 mcg/actuation inhaler Take 2 Puffs by inhalation every four (4) hours as needed for Wheezing. 1 Inhaler 4    levalbuterol (XOPENEX) 1.25 mg/3 mL nebu 3 mL by Nebulization route every six (6) hours as needed. 1 Package 2    aspirin 81 mg chewable tablet Take 1 Tab by mouth daily. 90 Tab 4    albuterol (PROVENTIL VENTOLIN) 2.5 mg /3 mL (0.083 %) nebulizer solution 3 mL by Nebulization route every four (4) hours as needed for Wheezing. 1 Package 1    FA/MV-MN/MIN AA JANETT/SAW PAL (SAW PALMETO-MULTIVIT-MIN-AA-FA PO) Take 1 Tab by mouth daily.  Cholecalciferol, Vitamin D3, (VITAMIN D) 2,000 unit Cap Take 1 Cap by mouth daily. 30 Cap 11       Patient Active Problem List    Diagnosis    Other specified abnormal immunological findings in serum     Pos SSA, BRENDA annual surveillance without tx Zunilda Duke MD      Hyperlipidemia     3/6/2016: ASCVD 10 year risk 14.9 %. Lifetime risk NA . Statin initiated.  Diastolic dysfunction     2/85/8201: inpatient echo (chest pain) EF 07-92% diastolic dysfunction I. Dr. Guadalupe Bruce. CCB initiated 3/6/2016. Per Dr. Germaine Whitlock (pulm) re comorbid asthma/COPD 9/8/2017, avoid ACE I. Favor BB and ARB.  Essential hypertension     3/6/2016: ASCVD 10 year risk 14.9 %. Lifetime risk NA. Aspirin initiated. Switched from thiazide to CCB for context of diastolic dysfunction.  Elevated blood pressure    Elevated PSA    BPH with obstruction/lower urinary tract symptoms    Family history of prostate cancer    Lumbosacral radiculopathy due to degenerative joint disease of spine    Cervical radiculopathy due to degenerative joint disease of spine    Allergic rhinitis    Severe persistent asthma without complication     5/5/5146: PFT consistent with Obstructive airway disease mild in nature. 9/13/2018: Rosanne Hagen MD: FEV1 of 2.25 or 67% predicted March 2018 PFT-IgE level of 324-on Xolair  -Positive SSA, BRENDA, RF, surveillance without tx by rheumatology      Cough    Vitamin D deficiency         Allergies   Allergen Reactions    Latex Hives and Itching    Cashew Nut Other (comments)     \"UPSET STOMACH\"    Milk Diarrhea    Other Medication Other (comments)     Pt allergic to cats with respiratory, skin reactions. Pt allergic to cashews with upset stomach    Vicodin [Hydrocodone-Acetaminophen] Other (comments)     hallucinations         Patient Care Team:  Sarahi Burnett MD as PCP - General (Family Practice)  Sarahi Burnett MD (Family Practice)  Magui Velásquez MD (Pulmonary Disease)  Jeff Hernandez MD as Surgeon (Surgery)  Christian Muller MD (Rheumatology)    The following sections were reviewed & updated as appropriate: PMH, PSH, FH, and SH. ROS - per HPI       Objective:     Visit Vitals    /74    Pulse 83    Temp 97.9 °F (36.6 °C)    Resp 14    Ht 5' 10\" (1.778 m)    Wt 160 lb (72.6 kg)    SpO2 97%    BMI 22.96 kg/m2      Physical Exam   Constitutional: He is oriented to person, place, and time. He appears well-developed and well-nourished. No distress. HENT:   Head: Normocephalic and atraumatic. Pulmonary/Chest: Effort normal.   Lymphadenopathy:     He has no cervical adenopathy. Neurological: He is alert and oriented to person, place, and time. Skin: Skin is warm and dry. Lesion noted. warty brown lesion behind left ear approx 2 mm diameter and 2 mm tall on background of normal skin with multiple 1-2 mm brown papules scattered all over neck   Psychiatric: He has a normal mood and affect. His behavior is normal. Judgment and thought content normal.         Disclaimer: The patient understands our medical plan. Alternatives have been explained and offered. The risks, benefits and significant side effects of all medications have been reviewed. Anticipated time course and progression of condition reviewed. All questions have been addressed. He is encouraged to employ the information provided in the after visit summary, which was reviewed. Where appropriate, he is instructed to call the clinic if he has not been notified either by phone or through [address] with the results of his tests or with an appointment plan for any referrals within 1 week(s). No news is not good news; it's no news. The patient  is to call if his condition worsens or fails to improve or if significant side effects are experienced. Aspects of this note may have been generated using voice recognition software. Despite editing, there may be unrecognized errors.        Nidia Cazares MD

## 2018-09-28 NOTE — PATIENT INSTRUCTIONS
Wound Care: After Your Visit  Your Care Instructions  Taking good care of your wound at home will help it heal quickly and reduce your chance of infection. The doctor has checked you carefully, but problems can develop later. If you notice any problems or new symptoms, get medical treatment right away. Follow-up care is a key part of your treatment and safety. Be sure to make and go to all appointments, and call your doctor if you are having problems. It's also a good idea to know your test results and keep a list of the medicines you take. How can you care for yourself at home? · Clean the area with soap and water 2 times a day unless your doctor gives you different instructions. Don't use hydrogen peroxide or alcohol, which can slow healing. ¨ You may cover the wound with a thin layer of antibiotic ointment, such as bacitracin, and a nonstick bandage. ¨ Apply more ointment and replace the bandage as needed. · Take pain medicines exactly as directed. Some pain is normal with a wound, but do not ignore pain that is getting worse instead of better. You could have an infection. ¨ If the doctor gave you a prescription medicine for pain, take it as prescribed. ¨ If you are not taking a prescription pain medicine, ask your doctor if you can take an over-the-counter medicine. · Your doctor may have closed your wound with stitches (sutures), staples, or skin glue. ¨ If you have stitches, your doctor may remove them after several days to 2 weeks. Or you may have stitches that dissolve on their own. ¨ If you have staples, your doctor may remove them after 7 to 10 days. ¨ If your wound was closed with skin glue, the glue will wear off in a few days to 2 weeks. When should you call for help? Call your doctor now or seek immediate medical care if:  · You have signs of infection, such as:  ¨ Increased pain, swelling, warmth, or redness near the wound. ¨ Red streaks leading from the wound.   ¨ Pus draining from the wound. ¨ A fever. · You bleed so much from your incision that you soak one or more bandages over 2 to 4 hours. Watch closely for changes in your health, and be sure to contact your doctor if:  · The wound is not getting better each day. Where can you learn more? Go to TappnGo.be  Enter M973 in the search box to learn more about \"Wound Care: After Your Visit. \"   © 1634-6363 Healthwise, Incorporated. Care instructions adapted under license by [employer] (which disclaims liability or warranty for this information). This care instruction is for use with your licensed healthcare professional. If you have questions about a medical condition or this instruction, always ask your healthcare professional. [address] any warranty or liability for your use of this information. Content Version: 42.5.171743;  Last Revised: April 23, 2012

## 2018-09-28 NOTE — MR AVS SNAPSHOT
[facility] [address] [facility] 
[phone] Patient: Mary Burnett MRN: [id number] Visit Information Date & Time Provider Department Dept. Phone Encounter #  
 2018  9:30 AM Sarahi Burnett MD [facility] [phone] [id number] Follow-up Instructions Return if symptoms worsen or fail to improve. Upcoming Health Maintenance Date Due Shingrix Vaccine Age 50> (1 of 2) 1999 GLAUCOMA SCREENING Q2Y 2015 Influenza Age 5 to Adult 2018 MEDICARE YEARLY EXAM 2019 COLONOSCOPY 2020 DTaP/Tdap/Td series (2 - Td) 2020 Allergies as of 2018  Review Complete On: 2018 By: Sarahi Burnett MD  
  
 Severity Noted Reaction Type Reactions Latex  2015    Hives, Itching Cashew Nut  2016    Other (comments) \"UPSET STOMACH\" Milk  2018    Diarrhea Other Medication  2013    Other (comments) Pt allergic to cats with respiratory, skin reactions. Pt allergic to cashews with upset stomach Vicodin [Hydrocodone-acetaminophen]  05/15/2014    Other (comments)  
 hallucinations Current Immunizations  Reviewed on 2018 Name Date Influenza Vaccine 2013 Pneumococcal Conjugate (PCV-13) 2016 Pneumococcal Polysaccharide (PPSV-23) 2018, 2013 TDAP Vaccine 2010 Not reviewed this visit You Were Diagnosed With   
  
 Codes Comments Benign skin lesion    -  Primary ICD-10-CM: L98.9 ICD-9-CM: 709.9 Vitals BP Pulse Temp Resp Height(growth percentile) Weight(growth percentile) 120/74 83 97.9 °F (36.6 °C) 14 5' 10\" (1.778 m) 160 lb (72.6 kg) SpO2 BMI Smoking Status 97% 22.96 kg/m2 Never Smoker Vitals History BMI and BSA Data Body Mass Index Body Surface Area  
 22.96 kg/m 2 1.89 m 2 Preferred Pharmacy Pharmacy Name Phone [facility], [address] [place] [address] Rd [phone] Your Updated Medication List  
  
   
This list is accurate as of 9/28/18 10:10 AM.  Always use your most recent med list.  
  
  
  
  
 albuterol 2.5 mg /3 mL (0.083 %) nebulizer solution Commonly known as:  PROVENTIL VENTOLIN  
3 mL by Nebulization route every four (4) hours as needed for Wheezing. aspirin 81 mg chewable tablet Take 1 Tab by mouth daily. cetirizine 10 mg tablet Commonly known as:  ZYRTEC Take 1 Tab by mouth. cetirizine-psuedoePHEDrine 5-120 mg per tablet Commonly known as:  ZyrTEC-D Take 1 Tab by mouth daily. Cholecalciferol (Vitamin D3) 2,000 unit Cap capsule Commonly known as:  VITAMIN D Take 1 Cap by mouth daily. fluticasone-salmeterol 500-50 mcg/dose diskus inhaler Commonly known as:  ADVAIR Take 1 Puff by inhalation every twelve (12) hours. levalbuterol 1.25 mg/3 mL Nebu Commonly known as:  XOPENEX  
3 mL by Nebulization route every six (6) hours as needed. levalbuterol tartrate 45 mcg/actuation inhaler Commonly known as:  Curtistine Dines Take 2 Puffs by inhalation every four (4) hours as needed for Wheezing.  
  
 montelukast 10 mg tablet Commonly known as:  SINGULAIR Take 1 Tab by mouth daily. * omeprazole 40 mg capsule Commonly known as:  PRILOSEC Take 1 Cap by mouth. * Omeprazole delayed release 20 mg tablet Commonly known as:  PRILOSEC D/R Take 1 Tab by mouth daily. SAW RAHUL-MULTIVIT-MIN-AA-FA PO Take 1 Tab by mouth daily. XOLAIR 150 mg Solr Generic drug:  omalizumab  
by SubCUTAneous route once. * Notice: This list has 2 medication(s) that are the same as other medications prescribed for you. Read the directions carefully, and ask your doctor or other care provider to review them with you. Follow-up Instructions Return if symptoms worsen or fail to improve. To-Do List   
 10/11/2018 10:00 AM  
  Appointment with [facility] FAST TRACK NURSE at [address] [facility] ([phone]) 10/25/2018 10:00 AM  
  Appointment with [facility] FAST TRACK NURSE at [address] [facility] ([phone]) Patient Instructions Wound Care: After Your Visit Your Care Instructions Taking good care of your wound at home will help it heal quickly and reduce your chance of infection. The doctor has checked you carefully, but problems can develop later. If you notice any problems or new symptoms, get medical treatment right away. Follow-up care is a key part of your treatment and safety. Be sure to make and go to all appointments, and call your doctor if you are having problems. It's also a good idea to know your test results and keep a list of the medicines you take. How can you care for yourself at home? · Clean the area with soap and water 2 times a day unless your doctor gives you different instructions. Don't use hydrogen peroxide or alcohol, which can slow healing. ¨ You may cover the wound with a thin layer of antibiotic ointment, such as bacitracin, and a nonstick bandage. ¨ Apply more ointment and replace the bandage as needed. · Take pain medicines exactly as directed. Some pain is normal with a wound, but do not ignore pain that is getting worse instead of better. You could have an infection. ¨ If the doctor gave you a prescription medicine for pain, take it as prescribed. ¨ If you are not taking a prescription pain medicine, ask your doctor if you can take an over-the-counter medicine. · Your doctor may have closed your wound with stitches (sutures), staples, or skin glue. ¨ If you have stitches, your doctor may remove them after several days to 2 weeks. Or you may have stitches that dissolve on their own. ¨ If you have staples, your doctor may remove them after 7 to 10 days. ¨ If your wound was closed with skin glue, the glue will wear off in a few days to 2 weeks. When should you call for help? Call your doctor now or seek immediate medical care if: 
· You have signs of infection, such as: 
¨ Increased pain, swelling, warmth, or redness near the wound. ¨ Red streaks leading from the wound. ¨ Pus draining from the wound. ¨ A fever. · You bleed so much from your incision that you soak one or more bandages over 2 to 4 hours. Watch closely for changes in your health, and be sure to contact your doctor if: · The wound is not getting better each day. Where can you learn more? Go to Slidely.be Enter S521 in the search box to learn more about \"Wound Care: After Your Visit. \"  
© 6609-4881 Healthwise, Incorporated. Care instructions adapted under license by [employer] (which disclaims liability or warranty for this information). This care instruction is for use with your licensed healthcare professional. If you have questions about a medical condition or this instruction, always ask your healthcare professional. [place] [address] any warranty or liability for your use of this information. Content Version: 84.7.037547; Last Revised: April 23, 2012 Introducing [facility]! Dear Rosa Valdovinos: Thank you for requesting a Loccie account. Our records indicate that you already have an active Loccie account. You can access your account anytime at [email or web address] Did you know that you can access your hospital and ER discharge instructions at any time in Loccie? You can also review all of your test results from your hospital stay or ER visit. Additional Information If you have questions, please visit the Frequently Asked Questions section of the Loccie website at [email or web address] Remember, Loccie is NOT to be used for urgent needs. For medical emergencies, dial 911. Now available from your iPhone and Android! Please provide this summary of care documentation to your next provider. Your primary care clinician is listed as Sarah Ramos. If you have any questions after today's visit, please call [phone].

## 2018-09-28 NOTE — PROGRESS NOTES
Chief Complaint   Patient presents with    Mole     on back of neck. States it showed up about 1 week ago. 1. Have you been to the ER, urgent care clinic since your last visit? Hospitalized since your last visit? No    2. Have you seen or consulted any other health care providers outside of the [facility] since your last visit? Include any pap smears or colon screening.  No

## 2018-10-11 ENCOUNTER — HOSPITAL ENCOUNTER (OUTPATIENT)
Dept: INFUSION THERAPY | Age: 68
Discharge: HOME | End: 2018-10-11
Payer: MEDICARE

## 2018-10-11 VITALS
HEART RATE: 88 BPM | SYSTOLIC BLOOD PRESSURE: 117 MMHG | OXYGEN SATURATION: 96 % | DIASTOLIC BLOOD PRESSURE: 73 MMHG | TEMPERATURE: 97.3 F | RESPIRATION RATE: 18 BRPM

## 2018-10-11 PROCEDURE — 74011250636 HC RX REV CODE- 250/636: Performed by: INTERNAL MEDICINE

## 2018-10-11 RX ADMIN — OMALIZUMAB 150 MG: 202.5 INJECTION, SOLUTION SUBCUTANEOUS at 10:40

## 2018-10-11 RX ADMIN — OMALIZUMAB 150 MG: 202.5 INJECTION, SOLUTION SUBCUTANEOUS at 10:38

## 2018-10-11 NOTE — PROGRESS NOTES
[facility] Progress Note Date: 2018 Name: Anmol Pan MRN: [id number] : 1950 Xolair injection every 2 weeks Mr. Maryann Salgado was assessed and education was provided. State the medication continues to improve his ability to breath and not use his inhalers as much. Mr. Anastacio Box vitals were reviewed and patient was observed for 5 minutes prior to treatment. Visit Vitals  /73 (BP 1 Location: Left arm, BP Patient Position: Sitting)  Pulse 88  Temp 97.3 °F (36.3 °C)  Resp 18  SpO2 96% No results found for this or any previous visit (from the past 12 hour(s)). Xolair 300 mg was administered subcutaneously in 150 mg doses in the back of upper right and left arms. No irritation noted at site, band aids applied. Mr. Maryann Salgado tolerated well, and had no complaints. Patient armband removed and shredded. Mr. Maryann Salgado was discharged from [place] [address] in stable condition at 1045. He is to return on 10/25/2018 at 1000 for his next appointment for Xolair injections. Rubi London RN 2018 12:31 PM

## 2018-10-23 PROBLEM — M47.816 LUMBAR SPONDYLOSIS: Status: ACTIVE | Noted: 2018-10-23

## 2018-10-25 ENCOUNTER — HOSPITAL ENCOUNTER (OUTPATIENT)
Dept: INFUSION THERAPY | Age: 68
Discharge: HOME | End: 2018-10-25
Payer: MEDICARE

## 2018-10-25 VITALS
OXYGEN SATURATION: 98 % | SYSTOLIC BLOOD PRESSURE: 127 MMHG | DIASTOLIC BLOOD PRESSURE: 90 MMHG | RESPIRATION RATE: 18 BRPM | HEART RATE: 83 BPM | TEMPERATURE: 98.2 F

## 2018-10-25 PROCEDURE — 96372 THER/PROPH/DIAG INJ SC/IM: CPT

## 2018-10-25 PROCEDURE — 74011250636 HC RX REV CODE- 250/636: Performed by: INTERNAL MEDICINE

## 2018-10-25 RX ADMIN — OMALIZUMAB 150 MG: 202.5 INJECTION, SOLUTION SUBCUTANEOUS at 10:34

## 2018-10-25 RX ADMIN — OMALIZUMAB 150 MG: 202.5 INJECTION, SOLUTION SUBCUTANEOUS at 10:37

## 2018-10-25 NOTE — PROGRESS NOTES
[facility] Short Consult Note (Labs/Type & Cross/Portflush/Antibiotic/Non-Chemo injections) Date: 2018 Name: Sergey Hein MRN: [id number] : 1950 Treatment: Xolair Injection Mr. Palomo Peres was assessed and education was provided. Mr. Marquise Ya vitals were reviewed and patient was observed for 5 minutes prior to treatment. Visit Vitals /90 (BP 1 Location: Left arm, BP Patient Position: Sitting) Pulse 83 Temp 98.2 °F (36.8 °C) Resp 18 SpO2 98% Xolair 300 mg SQ given in two equally divided injections. 
   
Xolair 150 mg SQ given in patient's right upper arm and band aid applied. 
   
Xolair 150 mg SQ given in patient's left upper arm and band aid applied to site. 
   
Mr. Rey tolerated injections, and had no complaints at this time. Mr. Palomo Peres was discharged from [place] [address] in stable condition at 1040. He is to return on 18 at 0830 for his next appointment. Armband removed and shredded . Nelda Jiménez RN 2018 10:41 AM

## 2018-11-07 ENCOUNTER — HOSPITAL ENCOUNTER (OUTPATIENT)
Dept: INFUSION THERAPY | Age: 68
Discharge: HOME | End: 2018-11-07
Payer: MEDICARE

## 2018-11-07 VITALS
RESPIRATION RATE: 18 BRPM | SYSTOLIC BLOOD PRESSURE: 115 MMHG | DIASTOLIC BLOOD PRESSURE: 79 MMHG | HEART RATE: 88 BPM | TEMPERATURE: 97.9 F | OXYGEN SATURATION: 96 %

## 2018-11-07 PROCEDURE — 74011250636 HC RX REV CODE- 250/636: Performed by: INTERNAL MEDICINE

## 2018-11-07 PROCEDURE — 96372 THER/PROPH/DIAG INJ SC/IM: CPT

## 2018-11-07 RX ADMIN — OMALIZUMAB 150 MG: 202.5 INJECTION, SOLUTION SUBCUTANEOUS at 11:29

## 2018-11-07 NOTE — PROGRESS NOTES
[facility] Progress Note Date: 2018 Name: Humble Restrepo MRN: [id number] : 1950 Mr. Rigo William arrived to [facility] at 200. Mr. Rigo William was assessed and education was provided. Mr. Jermaine Sparrow vitals were reviewed. Visit Vitals /79 (BP 1 Location: Left arm, BP Patient Position: Sitting) Pulse 88 Temp 97.9 °F (36.6 °C) Resp 18 SpO2 96% 150 mg xolair was administered as ordered SQ in patient's Right upper arm  
 
150 mg xolair was administered as ordered SQ in patient's left upper arm. Mr. Rigo William tolerated well without complaints. Mr. Rigo William was discharged from [place] [address] in stable condition at 1130. He is to return on 18 at 0830 for his next appointment. Nikita Atkins RN 2018

## 2018-11-21 ENCOUNTER — HOSPITAL ENCOUNTER (OUTPATIENT)
Dept: INFUSION THERAPY | Age: 68
Discharge: HOME | End: 2018-11-21
Payer: MEDICARE

## 2018-11-21 VITALS
OXYGEN SATURATION: 98 % | SYSTOLIC BLOOD PRESSURE: 150 MMHG | DIASTOLIC BLOOD PRESSURE: 94 MMHG | RESPIRATION RATE: 18 BRPM | HEART RATE: 84 BPM | TEMPERATURE: 98.2 F

## 2018-11-21 PROCEDURE — 96372 THER/PROPH/DIAG INJ SC/IM: CPT

## 2018-11-21 PROCEDURE — 74011250636 HC RX REV CODE- 250/636: Performed by: INTERNAL MEDICINE

## 2018-11-21 RX ADMIN — OMALIZUMAB 150 MG: 202.5 INJECTION, SOLUTION SUBCUTANEOUS at 14:21

## 2018-11-21 RX ADMIN — OMALIZUMAB 150 MG: 202.5 INJECTION, SOLUTION SUBCUTANEOUS at 14:20

## 2018-11-21 NOTE — PROGRESS NOTES
[facility] Short Consult Note (Labs/Type & Cross/Portflush/Antibiotic/Non-Chemo injections) Date: 2018 Name: Lilia Alfonso MRN: [id number] : 1950 Treatment: Xolair Injection Mr. Christen Simpson was assessed and education was provided. Mr. Sea Montesinos vitals were reviewed and patient was observed for 5 minutes prior to treatment. Visit Vitals BP (!) 150/94 (BP 1 Location: Left leg, BP Patient Position: Sitting) Pulse 84 Temp 98.2 °F (36.8 °C) Resp 18 SpO2 98% Pt just received a steroid injections and believes this has caused the spike in his blood pressure, Xolair 300 mg SQ given in two equally divided injections. 
   
Xolair 150 mg SQ given in patient's right upper arm and band aid applied. 
   
Xolair 150 mg SQ given in patient's left upper arm and band aid applied to site. 
   
Mr. Rey tolerated injections, and had no complaints at this time. Mr. Christen Simpson was discharged from [place] [address] in stable condition at 1430. He is to return on 18 at 0830 for his next appointment for [address] [place] [address] Kristen Armband removed and shredded . Ac Carvalho RN 2018 10:41 AM

## 2018-11-28 ENCOUNTER — HOSPITAL ENCOUNTER (OUTPATIENT)
Dept: PREADMISSION TESTING | Age: 68
Discharge: HOME | End: 2018-11-28
Attending: ORTHOPAEDIC SURGERY
Payer: MEDICARE

## 2018-11-28 LAB
ALBUMIN SERPL-MCNC: 3.7 G/DL (ref 3.4–5)
ALBUMIN/GLOB SERPL: 0.9 {RATIO} (ref 0.8–1.7)
ALP SERPL-CCNC: 65 U/L (ref 45–117)
ALT SERPL-CCNC: 28 U/L (ref 16–61)
ANION GAP SERPL CALC-SCNC: 5 MMOL/L (ref 3–18)
AST SERPL-CCNC: 22 U/L (ref 15–37)
BILIRUB SERPL-MCNC: 0.7 MG/DL (ref 0.2–1)
BUN SERPL-MCNC: 20 MG/DL (ref 7–18)
BUN/CREAT SERPL: 16
CALCIUM SERPL-MCNC: 9 MG/DL (ref 8.5–10.1)
CHLORIDE SERPL-SCNC: 100 MMOL/L (ref 100–108)
CO2 SERPL-SCNC: 31 MMOL/L (ref 21–32)
CREAT SERPL-MCNC: 1.24 MG/DL (ref 0.6–1.3)
ERYTHROCYTE [DISTWIDTH] IN BLOOD BY AUTOMATED COUNT: 12.5 % (ref 11.6–14.5)
GLOBULIN SER CALC-MCNC: 4.3 G/DL (ref 2–4)
GLUCOSE SERPL-MCNC: 105 MG/DL (ref 74–99)
HCT VFR BLD AUTO: 43.3 % (ref 36–48)
HGB BLD-MCNC: 14.8 G/DL (ref 13–16)
MCH RBC QN AUTO: 32.1 PG (ref 24–34)
MCHC RBC AUTO-ENTMCNC: 34.2 G/DL (ref 31–37)
MCV RBC AUTO: 93.9 FL (ref 74–97)
PLATELET # BLD AUTO: 198 K/UL (ref 135–420)
PMV BLD AUTO: 10.5 FL (ref 9.2–11.8)
POTASSIUM SERPL-SCNC: 5 MMOL/L (ref 3.5–5.5)
PROT SERPL-MCNC: 8 G/DL (ref 6.4–8.2)
RBC # BLD AUTO: 4.61 M/UL (ref 4.7–5.5)
SODIUM SERPL-SCNC: 136 MMOL/L (ref 136–145)
WBC # BLD AUTO: 2.9 K/UL (ref 4.6–13.2)

## 2018-11-28 PROCEDURE — 36415 COLL VENOUS BLD VENIPUNCTURE: CPT

## 2018-11-28 PROCEDURE — 93005 ELECTROCARDIOGRAM TRACING: CPT

## 2018-11-28 PROCEDURE — 85027 COMPLETE CBC AUTOMATED: CPT

## 2018-11-28 PROCEDURE — 80053 COMPREHEN METABOLIC PANEL: CPT

## 2018-11-29 ENCOUNTER — DOCUMENTATION ONLY (OUTPATIENT)
Dept: PULMONOLOGY | Age: 68
End: 2018-11-29

## 2018-11-29 LAB
ATRIAL RATE: 82 BPM
CALCULATED P AXIS, ECG09: 80 DEGREES
CALCULATED R AXIS, ECG10: 69 DEGREES
CALCULATED T AXIS, ECG11: 55 DEGREES
DIAGNOSIS, 93000: NORMAL
P-R INTERVAL, ECG05: 134 MS
Q-T INTERVAL, ECG07: 376 MS
QRS DURATION, ECG06: 76 MS
QTC CALCULATION (BEZET), ECG08: 439 MS
VENTRICULAR RATE, ECG03: 82 BPM

## 2018-11-29 NOTE — PROGRESS NOTES
PATIENT:       Litzy Ambrose   YOB: 1950  DATE:       11/29/2018 03:45 PM  VISIT TYPE:       Office Visit      Assessment/Plan  # Detail Type Description    1. Assessment Cough (R05).     Impression -Ongoing cough  -Marked improvement in symptoms with Advair/Singulair suggest possibly of cough variant asthma  -GERD, postnasal drip contributing  -Currently follows with ENT specialist also      Plan:  -We will consider further workup in related to difficult to control asthma including HSP panel IgE level CBC to look for eosinophils and ANCA levels  3/19/2018:  -PFT with FEV1 of 2.25 or 67% predicted and FEV1 to FVC ratio 53 unchanged from prior PFT  --Allergen profile September 2017 showed multiple allergen positive  -BRENDA positive  -SS a greater than 8  -Aspergillus antibody negative  -ANCA negative  -CRP 15.71  -RF 29.40  -IgE level 324  -Started on Xolair since January marked improvement in symptoms has not used rescue inhaler at all  -Clinically doing very well symptoms are very well controlled with start of Xolair    9/12/2018:  -Came for follow-up  -Overall doing very well, denies any cough wheezing chest pain or shortness of breath  -As per the patient after start of Xolair marked improvement in symptoms  -No history of any exacerbation or emergency room visit  -Not using Restoril correct all    Plan:  -As patient is symptom-free for last 6 months will consider stepping down therapy  -Decrease Advair 250/50 continue Singulair and albuterol  11-29-18  -Came for follow-up  -Doing well denies any complaint  -No history of any cough wheezing chest pain or shortness of breath  -No history of any exacerbation  -Currently on Advair 250/50, Singulair, albuterol when necessary, Xolair  -Not using albuterol at all    Plan:  -Patient is symptom-free for 6 months will consider further stepping on therapy  -Decrease adequate 100/50  -Continue Singulair/solar/albuterol when necessary  -Follow-up in 6 months  -Flu shot is recommended patient does not want to take it. Plan Orders Active Medication: Advair Diskus 100 mcg-50 mcg/dose powder for inhalation         2. Assessment Unspecified asthma, uncomplicated (A12.257). Impression -As above  -Asthma action plan discussed kit given to the patient  -Continue close monitoring will follow-up one more time in 3 months as patient has more symptoms during the fall and springtime    3/19/2018:  -Asthma symptoms are very well controlled with start of Xolair  -Follow-up in 6 months sooner if anything changes  -To date with flu shot asthma action plan discussed    November 2018:  -Continue doing well  -Consider further stepping on therapy Advair 100/50  -Follow-up in 6 months. 3. Assessment Gastro-esophageal reflux disease without esophagitis (K21.9). Impression -Continue PPI. 4. Assessment Cardiomyopathy, unspecified type (I42.9). Impression -Echo in 2016 showed EF of 45-50%  -Stress test in 2016 was negative  -If patient continued to have symptoms of shortness of breath consider adding treatment for cardio myopathy including is ARB/beta blockers    Plan:  -Continue management per PCP  -If added management consider avoiding ACE and preferred ARB due to cough. .         5. 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 Impression -BRENDA positive, RF 29, S as a 8, ANCA negative, ACE negative    Plan:  -Continue close follow-up with rheumatology  -Notes suggest positive antibodies without symptoms and clinical observation is recommended.             Assessment  -Severe persistent asthma with FEV1 of 2.25 or 67% predicted March 2018 PFT  -IgE level of 324-on Xolair  -Positive SSA, BRENDA, RF currently follows with rheumatology    Plan  -Continue Xolair, Advair, Singulair, albuterol-stepping on therapy Advair 100/50  -Follow-up in 6 months sooner if anything changes  -Side effect profile of Xolair reviewed  -Plan of care discussed with the patient at length consider Stephan Stoddard over next visit and philly 1 level if not done    I reviewed patient's prior studies available to me including sleep studies, compliance download, PFT if available, chest x-ray if available. I reviewed patient's medication and adjusted the medication. All the questions related to patient's management plan discussed and answered to patient's satisfaction. I spent 15 minutes of my time, more than 50% of time spent on face-to-face evaluation, for diagnosis pathology and discussion of various treatment options patient understood the plan all questions were on start. Voice-recognition software may have been used to generate this report, which may have resulted in some phonetic-based errors in grammar and contents. Even though attempts were made to correct all the mistakes, some may have been missed, and remained in the body of the document. This 70year old male presents for HPI and Asthma. History of Present Illness:  1. HPI   -Prior patient seen in [facility] in 2015  -Patient has been diagnosed with asthma since 2011. Initially his initial symptoms were cough and he was struggling to control his cough and during the evaluation Dr. Kartik Hunter felt that he might have cough variant asthma and that is why he was started on Albuterol. He has some improvement in the symptoms and that is how he was diagnosed with cough variant asthma. He had been followed up some some pulmonologist at [facility], we do not know the details of that. He also had some workup done of coronary artery disease in 2011, which was essentially negative with a negative EKG stress test.  Over a period of the last few years he stated that he has more symptoms, mainly in the spring and fall time.    -He also had seen an ENT specialist who is an allergy and asthma specialist, He basically does a lot of talking because he is a  and at times he feels that he does not have enough air.   He does not have any hemoptysis, no weight loss, no loss of appetite, no recent move, no new job, no pets at home, no new exposure to any dust or fumes, no history of exposure to any chemicals or fire exposure.      -He is allergic to cats, penicillin and cashew nuts. He has never been admitted to the hospital, however about a year ago he had an ER visit where it initially was thought related to cardiac issues, however later on he was told that this was related to asthma.     September 2017:  -Came for regular follow-up  -Currently on Advair 550, Zyrtec, Xopenex, albuterol neb   -Asthma is well-controlled no history of any exacerbation not using rescue inhalers for last 3-4 months  -Only complaint is hoarseness of the voice  -Positive for GERD positive for postnasal drip  -Not on aspirin not on ACE inhibitor  -No history of another connective tissue disease, exposure to fire, no history of any pets at home  -No history of smoking    3/19/2018:  -Came for follow-up  -History of asthma level was done found to have elevated IgE levels 324  -Currently on Xolair injection  -Patient feels much better his symptoms has marked improvement he has not used albuterol in last 3-4 months he feels like a new person  -Denies any cough wheezing chest pain or shortness of breath  -Denies any other complaint  -Evaluated by rheumatologist for positive SSA suggested close clinical observation    11-29-18  -Came for follow-up  -Doing well denies any complaint  -No history of any cough wheezing chest pain or shortness of breath  -No history of any exacerbation  -Currently on Advair 250/50, Singulair, albuterol when necessary, Xolair  -Not using albuterol at all    Investigation:  -PFT March 2018:  -FEV1 to FVC ratio of 53, FEV1 of 2.25 or 67% predicted, unchanged from prior PFT  -Non-significant bronchodilator response  -TLC of 6.95 106% predicted unchanged from prior PFT  -DLCO of 16.1 or 80% per dictated mild reduction from prior PFT    Results:  -Moderate to severe obstructive airway disease with airway remodeling consistent with clinical diagnosis of asthma and airway remodeling. Unchanged from prior PFT    -PFT 2015:  -FEV1 of 2.07 or 70% predicted, improved to 2.29 or 78% per dictated, 11% change  -Effient FVC ratio of 53  -TLC of 7.09 or 115% dictated  -DLCO of 21.4 or 77% predicted  -Moderate obstructive airway disease without significant bronchodilator response suggests COPD versus asthma with airway remodeling    -CT chest February 2016  1. No evidence of pulmonary embolism. 2.  No focal pneumonic opacity, pneumothorax, or pleural effusion. Laboratory  -Allergen profile September 2017 showed multiple allergen positive  -BRENDA positive  -SS a greater than 8  -Aspergillus antibody negative  -ANCA negative  -CRP 15.71  -RF 29.40  -IgE level 324    -Echo February 2016:  SUMMARY:  Procedure information: This was a technically difficult study. Left ventricle: Size was normal. Systolic function was mildly reduced. Cannot rule out mild anterior hypokinesis. Ejection fraction was estimated  in the range of 45 % to 50 %. Wall thickness was normal. Doppler  parameters were consistent with abnormal left ventricular relaxation  (grade 1 diastolic dysfunction). Right ventricle: The size was normal. Systolic pressure was within the  normal range. Estimated peak pressure was in the range of 25 mmHg to 30  mmHg. Left atrium: Size was normal.    Right atrium: Size was normal.    Mitral valve: There was trace to mild regurgitation. Aortic valve: There was no stenosis. There was no regurgitation. Tricuspid valve: There was mild regurgitation.

## 2018-11-30 NOTE — PROGRESS NOTES
PT DAILY TREATMENT NOTE - [facility]     Patient Name: Jaiden Larsen  Date:2018  : 1950  [x]  Patient  Verified  Payor: [place] MED[place]ARE / Plan: [place] MEDICARE PART A & B / Product Type: Medicare /    In time:3:00  Out time:3:50  Total Treatment Time (min): 50  Total Timed Codes (min): 50  1:1 Treatment Time ( [address] only): 50   Visit #: 4 of 8    Treatment Area: Low back pain [M54.5]    SUBJECTIVE  Pain Level (0-10 scale): 110  Any medication changes, allergies to medications, adverse drug reactions, diagnosis change, or new procedure performed?: [x] No    [] Yes (see summary sheet for update)  Subjective functional status/changes:   [] No changes reported  Patient noted that as he drove here today, he was in the car for about 45 minutes, he had right posterior thigh pain. States it was better if he moved his seat as far back as he could move it. If he moved his seat up, the pain became worse. OBJECTIVE    40 min Therapeutic Exercise:  [] See flow sheet :   Rationale: increase ROM, increase strength and improve coordination to improve the patients ability to increase their functional activity level. 10 min Manual Therapy:  Manual stretching HS, KTC, LTR   Rationale: increase ROM and increase tissue extensibility to increase ease of motion to improve function. With   [] TE   [] TA   [] neuro   [] other: Patient Education: [x] Review HEP    [] Progressed/Changed HEP based on:   [] positioning   [] body mechanics   [] transfers   [] heat/ice application    [] other:      Other Objective/Functional Measures: Lateral right knee burning sensation with SLR stretching. Gait is without deviation. Pain Level (0-10 scale) post treatment: 0/10    ASSESSMENT/Changes in Function: Patient with continued right LE pain which is intermittent.       Patient will continue to benefit from skilled PT services to modify and progress therapeutic interventions, address functional mobility deficits and analyze and Spoke w/pt informing him of referral.  Faxed referral threw [facility] as well. modify body mechanics/ergonomics to attain remaining goals. [x]  See Plan of Care  []  See progress note/recertification  []  See Discharge Summary         Progress towards goals / Updated goals:  1. Patient's pain level will be 0-3/10 with activity in order to improve patient's ability to perform normal ADLs. Evaluation:  0/10-10/10  Current:  0-6/10.  2. Patient will be able to lift and carry grocery bags with no limitations in order to return to his normal ADLs. Evaluation:  Limited in lifting and carrying grocery bags. 3. Patient will increase FOTO score to 85 pts to increase functional mobility. Evaluation:  83.     PLAN  [x]  Upgrade activities as tolerated     [x]  Continue plan of care  []  Update interventions per flow sheet       []  Discharge due to:_  []  Other:_      Bren Razo, PT 7/23/2018  3:40 PM    Future Appointments  Date Time Provider Chad Pérez   7/27/2018 9:30 AM Bren Razo, PT [facility]   7/30/2018 3:00 PM Bren Razo, PT [facility]   8/2/2018 10:30 AM [facility] FAST TRACK NURSE MARYCRUZ Fernandez [address] [facility]   8/3/2018 11:00 AM Bren Razo, PT [facility]   8/16/2018 10:00 AM [facility] FAST TRACK NURSE MARYCRUZ Fernandez [address] [facility]   8/30/2018 10:30 AM [facility] FAST TRACK NURSE MARYCRUZ Fernandez [address] [facility]

## 2018-12-05 ENCOUNTER — HOSPITAL ENCOUNTER (OUTPATIENT)
Dept: INFUSION THERAPY | Age: 68
Discharge: HOME | End: 2018-12-05
Payer: MEDICARE

## 2018-12-05 VITALS
SYSTOLIC BLOOD PRESSURE: 140 MMHG | OXYGEN SATURATION: 94 % | DIASTOLIC BLOOD PRESSURE: 86 MMHG | HEART RATE: 90 BPM | TEMPERATURE: 97.4 F | RESPIRATION RATE: 18 BRPM

## 2018-12-05 PROCEDURE — 96372 THER/PROPH/DIAG INJ SC/IM: CPT

## 2018-12-05 PROCEDURE — 74011250636 HC RX REV CODE- 250/636: Performed by: INTERNAL MEDICINE

## 2018-12-05 RX ADMIN — OMALIZUMAB 150 MG: 202.5 INJECTION, SOLUTION SUBCUTANEOUS at 08:58

## 2018-12-05 NOTE — PROGRESS NOTES
[facility] Short Consult Note (Labs/Type & Cross/Portflush/Antibiotic/Non-Chemo injections) Date: 2018 Name: Rafa Dockery MRN: [id number] : 1950 Treatment: Xolair Injection Mr. Justin Ellison was assessed and education was provided. Denies any reaction from last injection Mr. Joanne Hernandez vitals were reviewed and patient was observed for 5 minutes prior to treatment. Visit Vitals /86 (BP 1 Location: Left arm, BP Patient Position: Sitting) Pulse 90 Temp 97.4 °F (36.3 °C) Resp 18 SpO2 94% Xolair 300 mg SQ given in two equally divided injections. 
   
Xolair 150 mg SQ given in patient's right upper arm and band aid applied. 
   
Xolair 150 mg SQ given in patient's left upper arm and band aid applied to site. 
   
Mr. Rey tolerated injections, and had no complaints at this time. Mr. Justin Ellison was discharged from [place] [address] in stable condition at 0900 He is to return on 18 at 0830 for his next appointment for [address] [place], [place] [address]quyen Peterson Armband removed and shredded . Heather Jeffery RN 2018 10:41 AM

## 2018-12-11 PROBLEM — G56.21 CUBITAL TUNNEL SYNDROME ON RIGHT: Chronic | Status: ACTIVE | Noted: 2018-12-11

## 2018-12-11 PROBLEM — F32.A DEPRESSION: Chronic | Status: ACTIVE | Noted: 2018-12-11

## 2018-12-11 NOTE — H&P
History and Physical 
 
 
 
Patient: Luis E Ackerman               Sex: male          DOA: (Not on file) YOB: 1950      Age:  70 y.o.        LOS:  LOS: 0 days HPI:  
 
Smooth Cain is a 79-year-old right-handed [facility] American male referred here today for right chronic cubital tunnel syndrome. The patient has had this going on for many years' time. He had seen Dr. Leila Cervantes, who evaluated him at the cervical spine level but felt it was more related to the elbow than the cervical region. The patient is complaining of paresthesias in digits one and two mainly, however. He has had recent EMGs by Dr. 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 The patient has noted some decreased  strength on the right, and he has about half the ability to do his  strength exercises at the gym on the right compared to the left. AP, lateral, and oblique views of the right elbow were obtained and interpreted in the office and reveal no periosteal reaction, no fat pad sign, no medullary lesions, no osteopenia, well-aligned joint spaces, no chondrolysis, and no fractures. Past Medical History:  
Diagnosis Date  Arthritis  Asthma  Back problem  Burning with urination  Cough  Depression  GERD (gastroesophageal reflux disease)  Poor appetite  Trouble in sleeping  Wheezing Past Surgical History:  
Procedure Laterality Date  ABDOMEN SURGERY PROC UNLISTED    
 hernia repair 6/20/18  HX COLONOSCOPY    
 HX ORCHIECTOMY  1999  
 left, varicocele with complications  HX OTHER SURGICAL    
 epidural injection Family History Problem Relation Age of Onset  Hypertension Mother  Heart Disease Mother  Arthritis-rheumatoid Mother  Hypertension Father  Heart Attack Father 48  Cancer Brother 72  
     prostate  Cancer Brother 61  
     prostate  Colon Cancer Brother  Heart Attack Brother 50  Cancer Brother 66  
     prostate  Diabetes Brother  Heart Attack Sister 61  Colon Polyps Sister  Heart Attack Brother 54  
 Heart Attack Sister 72 Social History Socioeconomic History  Marital status:  Spouse name: Not on file  Number of children: Not on file  Years of education: Not on file  Highest education level: Not on file Tobacco Use  Smoking status: Never Smoker  Smokeless tobacco: Never Used Substance and Sexual Activity  Alcohol use: Yes Alcohol/week: 4.8 oz Types: 8 Cans of beer per week Comment: occ  Drug use: No  
 Sexual activity: Yes  
  Partners: Female Prior to Admission medications Medication Sig Start Date End Date Taking? Authorizing Provider  
fluticasone-salmeterol (ADVAIR DISKUS) 100-50 mcg/dose diskus inhaler Take 1 Puff by inhalation every twelve (12) hours. Provider, Historical  
omalizumab [facility] Ortiz) 150 mg solr by SubCUTAneous route every fourteen (14) days. Provider, Historical  
montelukast (SINGULAIR) 10 mg tablet Take 1 Tab by mouth daily. 9/1/17   Faviola Alva MD  
cetirizine-psuedoePHEDrine (ZYRTEC-D) 5-120 mg per tablet Take 1 Tab by mouth daily. 9/1/17   Faviola Alva MD  
Omeprazole delayed release (PRILOSEC D/R) 20 mg tablet Take 1 Tab by mouth daily. Patient taking differently: Take 20 mg by mouth as needed. 9/1/17   Faviola Alva MD  
fluticasone-salmeterol (ADVAIR) 500-50 mcg/dose diskus inhaler Take 1 Puff by inhalation every twelve (12) hours. Patient taking differently: Take 1 Puff by inhalation every twelve (12) hours. Changed to 250 1/31/17   Faviola Alva MD  
levalbuterol tartrate [facility]) 45 mcg/actuation inhaler Take 2 Puffs by inhalation every four (4) hours as needed for Wheezing. 1/31/17   Faviola Alva MD  
levalbuterol (XOPENEX) 1.25 mg/3 mL nebu 3 mL by Nebulization route every six (6) hours as needed.  7/29/16   Faviola Alva MD  
 aspirin 81 mg chewable tablet Take 1 Tab by mouth daily. 3/3/16   Luis Sosa MD  
albuterol (PROVENTIL VENTOLIN) 2.5 mg /3 mL (0.083 %) nebulizer solution 3 mL by Nebulization route every four (4) hours as needed for Wheezing. 4/1/15   Luis Sosa MD  
FA/MV-MN/MIN AA JANETT/SAW PAL (SAW PALMETO-MULTIVIT-MIN-AA-FA PO) Take 1 Tab by mouth daily. Provider, Historical  
Cholecalciferol, Vitamin D3, (VITAMIN D) 2,000 unit Cap Take 1 Cap by mouth daily. 1/19/11   Maria R Hawkins MD  
 
 
Allergies Allergen Reactions  Latex Hives and Itching  Cashew Nut Other (comments) \"UPSET STOMACH\"  Milk Diarrhea  Other Medication Other (comments) Pt allergic to cats with respiratory, skin reactions. Pt allergic to cashews with upset stomach  Vicodin [Hydrocodone-Acetaminophen] Other (comments)  
  hallucinations Review of Systems Pertinent positives include blurred vision, discharge of the eyes, changes in mood, gas/bloating and indigestion. Pertinent negatives include chest pain, chills, cold, dizziness, double vision, fever, headache, hearing loss, heart murmur, itching of the eyes, palpitations, redness of the eyes, rheumatic fever, ringing in ears, sore throat/hoarseness, weight change, abdominal pain, anxiety, bipolar disorder, bladder/kidney infection, bloody stool, blood in urine, burning sensation, chronic cough, depression, diarrhea, difficulty breathing, difficulty swallowing, fainting, frequent urinating, fracture/dislocation, gout, hemorrhoids, incontinence, joint pain, joint stiffness, loss of balance, memory loss, muscle weakness, nausea/vomiting, numbness/tingling, pain on breathing, painful urination, psoriasis, rash/itching, Raynaud's phenomenon, rheumatoid disease, seizure disorder, shortness of breath, sprain/strain, swelling of feet, tendonitis, varicose veins and wheezing. Physical Exam:  
  
There were no vitals taken for this visit. Physical Exam: Physical exam shows a healthy-appearing elderly  male. The right elbow has got positive Tinel's in the cubital tunnel that radiates down into digits one and two, which is kind of where he senses this. He does have some weakness in the right hand in the first dorsal interosseus and a surprisingly good  strength in the rest of his intrinsics. Otherwise, examination shows that the patients right wrist demonstrates no obvious swelling, ecchymosis, or wounds noted. There is no tenderness to palpation anywhere within the wrist or hand. The patient has normal motion in all six directions. The patient has a negative Phalen, and direct carpal compression tests. The patient has a negative Finkelstein maneuver. The patient has good capillary refill. The patient has normal thenar eminence tone and normal light-touch sensation at the tip of each digit. Assessment/Plan Principal Problem: 
  Cubital tunnel syndrome on right (12/11/2018) Active Problems: 
  Allergic rhinitis (6/2/2011) Severe persistent asthma without complication (6/6/3953) Overview: 5/7/2015: PFT consistent with Obstructive airway disease mild in nature. 9/13/2018: Nina Diamond MD: FEV1 of 2.25 or 67% predicted March 2018 PFTIgE level of 324on Xolair Positive SSA, BRENDA, RF, surveillance without tx by rheumatology Elevated PSA (2/4/2015) Hyperlipidemia (3/3/2016) Overview: 3/6/2016: ASCVD 10 year risk 14.9 %. Lifetime risk NA . Statin initiated. Essential hypertension (3/3/2016) Overview: 3/6/2016: ASCVD 10 year risk 14.9 %. Lifetime risk NA. Aspirin initiated. Switched from thiazide to CCB for context of diastolic dysfunction. Depression (12/11/2018) Dr. Ashleigh Serrato scheduled him for a right endoscopic cubital tunnel release.   He talked about the risks, the alternatives, and the benefits including infection, pain and bleeding, and he is willing to proceed. Dr. Andrey Rahman will see him again one week postop. Dr. Andrey Rahman issued ten Norco pills for postoperative pain use.

## 2018-12-12 ENCOUNTER — ANESTHESIA EVENT (OUTPATIENT)
Dept: SURGERY | Age: 68
End: 2018-12-12
Payer: MEDICARE

## 2018-12-12 RX ORDER — SODIUM CHLORIDE, SODIUM LACTATE, POTASSIUM CHLORIDE, CALCIUM CHLORIDE 600; 310; 30; 20 MG/100ML; MG/100ML; MG/100ML; MG/100ML
125 INJECTION, SOLUTION INTRAVENOUS CONTINUOUS
Status: CANCELLED | OUTPATIENT
Start: 2018-12-12 | End: 2018-12-13

## 2018-12-12 RX ORDER — SODIUM CHLORIDE 0.9 % (FLUSH) 0.9 %
5-10 SYRINGE (ML) INJECTION AS NEEDED
Status: CANCELLED | OUTPATIENT
Start: 2018-12-12

## 2018-12-12 RX ORDER — SODIUM CHLORIDE 0.9 % (FLUSH) 0.9 %
5-10 SYRINGE (ML) INJECTION EVERY 8 HOURS
Status: CANCELLED | OUTPATIENT
Start: 2018-12-12

## 2018-12-13 ENCOUNTER — HOSPITAL ENCOUNTER (OUTPATIENT)
Age: 68
Discharge: HOME | End: 2018-12-13
Attending: ORTHOPAEDIC SURGERY | Admitting: ORTHOPAEDIC SURGERY
Payer: MEDICARE

## 2018-12-13 ENCOUNTER — ANESTHESIA (OUTPATIENT)
Dept: SURGERY | Age: 68
End: 2018-12-13
Payer: MEDICARE

## 2018-12-13 VITALS
BODY MASS INDEX: 23.32 KG/M2 | DIASTOLIC BLOOD PRESSURE: 72 MMHG | TEMPERATURE: 98 F | HEART RATE: 87 BPM | SYSTOLIC BLOOD PRESSURE: 123 MMHG | HEIGHT: 71 IN | RESPIRATION RATE: 16 BRPM | OXYGEN SATURATION: 97 % | WEIGHT: 166.56 LBS

## 2018-12-13 PROBLEM — G56.21 CUBITAL TUNNEL SYNDROME, RIGHT: Status: ACTIVE | Noted: 2018-12-13

## 2018-12-13 PROCEDURE — 77030009770 HC INSTRU CRPL SET CNMD -C: Performed by: ORTHOPAEDIC SURGERY

## 2018-12-13 PROCEDURE — 77030012508 HC MSK AIRWY LMA AMBU -A: Performed by: ANESTHESIOLOGY

## 2018-12-13 PROCEDURE — 76010000138 HC OR TIME 0.5 TO 1 HR: Performed by: ORTHOPAEDIC SURGERY

## 2018-12-13 PROCEDURE — 77030020782 HC GWN BAIR PAWS FLX 3M -B: Performed by: ORTHOPAEDIC SURGERY

## 2018-12-13 PROCEDURE — 76060000032 HC ANESTHESIA 0.5 TO 1 HR: Performed by: ORTHOPAEDIC SURGERY

## 2018-12-13 PROCEDURE — 74011000250 HC RX REV CODE- 250: Performed by: ORTHOPAEDIC SURGERY

## 2018-12-13 PROCEDURE — 76210000021 HC REC RM PH II 0.5 TO 1 HR: Performed by: ORTHOPAEDIC SURGERY

## 2018-12-13 PROCEDURE — 74011250636 HC RX REV CODE- 250/636: Performed by: ORTHOPAEDIC SURGERY

## 2018-12-13 PROCEDURE — 76210000063 HC OR PH I REC FIRST 0.5 HR: Performed by: ORTHOPAEDIC SURGERY

## 2018-12-13 PROCEDURE — 74011250636 HC RX REV CODE- 250/636

## 2018-12-13 PROCEDURE — 74011000258 HC RX REV CODE- 258: Performed by: ORTHOPAEDIC SURGERY

## 2018-12-13 PROCEDURE — 77030032490 HC SLV COMPR SCD KNE COVD -B: Performed by: ORTHOPAEDIC SURGERY

## 2018-12-13 PROCEDURE — 74011000250 HC RX REV CODE- 250

## 2018-12-13 RX ORDER — SODIUM CHLORIDE, SODIUM LACTATE, POTASSIUM CHLORIDE, CALCIUM CHLORIDE 600; 310; 30; 20 MG/100ML; MG/100ML; MG/100ML; MG/100ML
1000 INJECTION, SOLUTION INTRAVENOUS CONTINUOUS
Status: DISCONTINUED | OUTPATIENT
Start: 2018-12-13 | End: 2018-12-13 | Stop reason: HOSPADM

## 2018-12-13 RX ORDER — FENTANYL CITRATE 50 UG/ML
50 INJECTION, SOLUTION INTRAMUSCULAR; INTRAVENOUS
Status: DISCONTINUED | OUTPATIENT
Start: 2018-12-13 | End: 2018-12-13 | Stop reason: HOSPADM

## 2018-12-13 RX ORDER — INSULIN LISPRO 100 [IU]/ML
INJECTION, SOLUTION INTRAVENOUS; SUBCUTANEOUS ONCE
Status: DISCONTINUED | OUTPATIENT
Start: 2018-12-13 | End: 2018-12-13 | Stop reason: HOSPADM

## 2018-12-13 RX ORDER — MIDAZOLAM HYDROCHLORIDE 1 MG/ML
INJECTION, SOLUTION INTRAMUSCULAR; INTRAVENOUS AS NEEDED
Status: DISCONTINUED | OUTPATIENT
Start: 2018-12-13 | End: 2018-12-13 | Stop reason: HOSPADM

## 2018-12-13 RX ORDER — ONDANSETRON 2 MG/ML
INJECTION INTRAMUSCULAR; INTRAVENOUS AS NEEDED
Status: DISCONTINUED | OUTPATIENT
Start: 2018-12-13 | End: 2018-12-13 | Stop reason: HOSPADM

## 2018-12-13 RX ORDER — NALOXONE HYDROCHLORIDE 0.4 MG/ML
0.1 INJECTION, SOLUTION INTRAMUSCULAR; INTRAVENOUS; SUBCUTANEOUS AS NEEDED
Status: DISCONTINUED | OUTPATIENT
Start: 2018-12-13 | End: 2018-12-13 | Stop reason: HOSPADM

## 2018-12-13 RX ORDER — DEXTROSE 50 % IN WATER (D50W) INTRAVENOUS SYRINGE
25-50 AS NEEDED
Status: DISCONTINUED | OUTPATIENT
Start: 2018-12-13 | End: 2018-12-13 | Stop reason: HOSPADM

## 2018-12-13 RX ORDER — FLUMAZENIL 0.1 MG/ML
0.2 INJECTION INTRAVENOUS
Status: DISCONTINUED | OUTPATIENT
Start: 2018-12-13 | End: 2018-12-13 | Stop reason: HOSPADM

## 2018-12-13 RX ORDER — DEXAMETHASONE SODIUM PHOSPHATE 4 MG/ML
INJECTION, SOLUTION INTRA-ARTICULAR; INTRALESIONAL; INTRAMUSCULAR; INTRAVENOUS; SOFT TISSUE AS NEEDED
Status: DISCONTINUED | OUTPATIENT
Start: 2018-12-13 | End: 2018-12-13 | Stop reason: HOSPADM

## 2018-12-13 RX ORDER — MAGNESIUM SULFATE 100 %
4 CRYSTALS MISCELLANEOUS AS NEEDED
Status: DISCONTINUED | OUTPATIENT
Start: 2018-12-13 | End: 2018-12-13 | Stop reason: HOSPADM

## 2018-12-13 RX ORDER — PROPOFOL 10 MG/ML
INJECTION, EMULSION INTRAVENOUS AS NEEDED
Status: DISCONTINUED | OUTPATIENT
Start: 2018-12-13 | End: 2018-12-13 | Stop reason: HOSPADM

## 2018-12-13 RX ORDER — KETAMINE HYDROCHLORIDE 10 MG/ML
INJECTION, SOLUTION INTRAMUSCULAR; INTRAVENOUS AS NEEDED
Status: DISCONTINUED | OUTPATIENT
Start: 2018-12-13 | End: 2018-12-13 | Stop reason: HOSPADM

## 2018-12-13 RX ORDER — PHENYLEPHRINE HCL IN 0.9% NACL 1 MG/10 ML
SYRINGE (ML) INTRAVENOUS AS NEEDED
Status: DISCONTINUED | OUTPATIENT
Start: 2018-12-13 | End: 2018-12-13 | Stop reason: HOSPADM

## 2018-12-13 RX ORDER — SODIUM CHLORIDE, SODIUM LACTATE, POTASSIUM CHLORIDE, CALCIUM CHLORIDE 600; 310; 30; 20 MG/100ML; MG/100ML; MG/100ML; MG/100ML
125 INJECTION, SOLUTION INTRAVENOUS CONTINUOUS
Status: DISCONTINUED | OUTPATIENT
Start: 2018-12-13 | End: 2018-12-13 | Stop reason: HOSPADM

## 2018-12-13 RX ORDER — LIDOCAINE HYDROCHLORIDE 20 MG/ML
INJECTION, SOLUTION EPIDURAL; INFILTRATION; INTRACAUDAL; PERINEURAL AS NEEDED
Status: DISCONTINUED | OUTPATIENT
Start: 2018-12-13 | End: 2018-12-13 | Stop reason: HOSPADM

## 2018-12-13 RX ADMIN — SODIUM CHLORIDE, SODIUM LACTATE, POTASSIUM CHLORIDE, AND CALCIUM CHLORIDE 125 ML/HR: 600; 310; 30; 20 INJECTION, SOLUTION INTRAVENOUS at 09:00

## 2018-12-13 RX ADMIN — Medication 100 MCG: at 11:22

## 2018-12-13 RX ADMIN — DEXAMETHASONE SODIUM PHOSPHATE 4 MG: 4 INJECTION, SOLUTION INTRA-ARTICULAR; INTRALESIONAL; INTRAMUSCULAR; INTRAVENOUS; SOFT TISSUE at 10:57

## 2018-12-13 RX ADMIN — ONDANSETRON 4 MG: 2 INJECTION INTRAMUSCULAR; INTRAVENOUS at 11:17

## 2018-12-13 RX ADMIN — LIDOCAINE HYDROCHLORIDE 60 MG: 20 INJECTION, SOLUTION EPIDURAL; INFILTRATION; INTRACAUDAL; PERINEURAL at 11:02

## 2018-12-13 RX ADMIN — KETAMINE HYDROCHLORIDE 10 MG: 10 INJECTION, SOLUTION INTRAMUSCULAR; INTRAVENOUS at 11:23

## 2018-12-13 RX ADMIN — PROPOFOL 180 MG: 10 INJECTION, EMULSION INTRAVENOUS at 11:02

## 2018-12-13 RX ADMIN — Medication 200 MCG: at 11:16

## 2018-12-13 RX ADMIN — SODIUM CHLORIDE, SODIUM LACTATE, POTASSIUM CHLORIDE, AND CALCIUM CHLORIDE: 600; 310; 30; 20 INJECTION, SOLUTION INTRAVENOUS at 11:23

## 2018-12-13 RX ADMIN — MIDAZOLAM HYDROCHLORIDE 2 MG: 1 INJECTION, SOLUTION INTRAMUSCULAR; INTRAVENOUS at 10:57

## 2018-12-13 RX ADMIN — SODIUM CHLORIDE, SODIUM LACTATE, POTASSIUM CHLORIDE, AND CALCIUM CHLORIDE 125 ML/HR: 600; 310; 30; 20 INJECTION, SOLUTION INTRAVENOUS at 12:08

## 2018-12-13 RX ADMIN — Medication 200 MCG: at 11:07

## 2018-12-13 RX ADMIN — Medication 200 MCG: at 11:11

## 2018-12-13 NOTE — ANESTHESIA PREPROCEDURE EVALUATION
Anesthetic History   No history of anesthetic complications            Review of Systems / Medical History  Patient summary reviewed, nursing notes reviewed and pertinent labs reviewed    Pulmonary            Asthma : well controlled       Neuro/Psych   Within defined limits      Psychiatric history     Cardiovascular    Hypertension: well controlled              Exercise tolerance: >4 METS     GI/Hepatic/Renal  Within defined limits   GERD: well controlled           Endo/Other  Within defined limits      Arthritis     Other Findings              Physical Exam    Airway  Mallampati: II  TM Distance: 4 - 6 cm  Neck ROM: normal range of motion   Mouth opening: Normal     Cardiovascular    Rhythm: regular  Rate: normal         Dental  No notable dental hx       Pulmonary  Breath sounds clear to auscultation               Abdominal  GI exam deferred       Other Findings            Anesthetic Plan    ASA: 2  Anesthesia type: general          Induction: Intravenous  Anesthetic plan and risks discussed with: Patient

## 2018-12-13 NOTE — ANESTHESIA POSTPROCEDURE EVALUATION
Procedure(s):  RIGHT ENDOSCOPIC CUBITAL TUNNEL RELEASE. Anesthesia Post Evaluation        Comments: Post-Anesthesia Evaluation and Assessment    Cardiovascular Function/Vital Signs  /74 (BP 1 Location: Left arm, BP Patient Position: At rest)   Pulse 83   Temp 36.4 °C (97.5 °F)   Resp 13   Ht 5' 11\" (1.803 m)   Wt 75.6 kg (166 lb 9 oz)   SpO2 99%   BMI 23.23 kg/m²     Patient is status post Procedure(s):  RIGHT ENDOSCOPIC CUBITAL TUNNEL RELEASE. Nausea/Vomiting: Controlled. Postoperative hydration reviewed and adequate. Pain:  Pain Scale 1: Numeric (0 - 10) (12/13/18 1210)  Pain Intensity 1: 0 (12/13/18 1210)   Managed. Neurological Status:   Neuro (WDL): Within Defined Limits (12/13/18 1200)   At baseline. Mental Status and Level of Consciousness: Arousable. Pulmonary Status:   O2 Device: Room air (12/13/18 1203)   Adequate oxygenation and airway patent. Complications related to anesthesia: None    Post-anesthesia assessment completed. No concerns. Patient has met all discharge requirements.     Signed By: Lourdes Langley MD    December 13, 2018                   Visit Vitals  /74 (BP 1 Location: Left arm, BP Patient Position: At rest)   Pulse 83   Temp 36.4 °C (97.5 °F)   Resp 13   Ht 5' 11\" (1.803 m)   Wt 75.6 kg (166 lb 9 oz)   SpO2 99%   BMI 23.23 kg/m²

## 2018-12-13 NOTE — DISCHARGE INSTRUCTIONS
Gautam Pena III, MD Trevor Fortes, PA-C    Upper Extremity Surgery  Discharge Instructions      Please take the time to review the following instructions before you leave the hospital and use them as guidelines during your recovery from surgery. If you have any questions you may contact my office at [phone]. Wound Care/Dressing Changes:    [x]   You may remove the bulky dressing two days after surgery. Once you remove this, no dressing is necessary if there is no drainage.      []   You may change your dressing as needed. Beginning the 2 days after you are discharged from the hospital you should change your dressing daily. A big, bulky dressing isnt necessary as long as there is any drainage from the incisions. You can put a band-aid or a piece of gauze over each incision and wear an ACE bandage as needed for comfort and swelling. []   Dont remove your dressing or get them wet.  It isnt necessary to apply antibiotic ointment to your incisions. Sutures will be removed at your one week post-op visit. Staples (if you have them) are removed in two weeks. If you have steri-strips over your incision they will start to peel off in 7-10 days as you get them wet. They dont need to be removed prior to that. When they begin to peel off, you may remove them. They should all be removed by 14 days from your surgery. Showering/Bathing:    []   You may shower 2 days after your surgery. Your dressing may be removed for showering. You may get your incisions wet in the shower. Dont vigorously scrub your incisions. Apply a clean, dry dressing after you have dried your incisions. Do not take a bath or get into a swimming pool or Jacuzzi until the incisions are completely healed. This may take about 14 days. Do not soak your incision under water. Sling:    []   You are not required to wear your sling and should do so only as needed for comfort.  You have no restrictions with regards to the movement of your shoulder. Please push to achieve full range of motion as soon as possible. You may resume your normal daily activities immediately and return to work as soon as you feel appropriate.    []   Keep your arm in the immobilizer at all times except when showering and changing your clothes. When showering or changing, keep your arm at your side. Dont move it away from your body. []   Keep your arm in the immobilizer at all times except when showering, changing your clothes and doing the exercises shown to you by Dr. Yoni Longo or your physical therapist prior to your discharge from the hospital.  Keep your arm at your side when changing your clothes and showering. Dont move it away from your body. Ice/Elevation    Continue ice consistently for 48 hours after surgery. After 48 hours, you should ice your shoulder 3 times per day, for 20 minutes at a time for the next 5 days. After one week from surgery, you may use ice as needed for pain and swelling. Diet:    You may advance to your regular diet as tolerated. Medication:    1. You will be given a prescription for pain medication when you are discharged from the hospital.  Take the medication as needed according to the directions on the prescription bottle. Possible side effects of the medication include dizziness, headache, nausea, vomiting, constipation and urinary retention. If you experience any of these side effects call the office so that we can assist you in relieving them. Discontinue the use of the pain medication if you develop itching, rash, shortness of breath or difficulties swallowing. If these symptoms become severe or arent relieved by discontinuing the medication you should seek immediate medical attention. Refills of pain medication are authorized during office hours only (8 AM-5PM Mon. thru Fri.). 2. If you were prescribed Percoset/oxycodone or Dilaudid/hydromorphone you must have a written prescription. These medications legally cannot be called in to the pharmacy. 3. You may take over the counter Ibuprofen/Advil/Aleve between dosages of your pain medication if needed. Do not take Tylenol in addition to your pain medication as most of the pain medication already contains Tylenol. Do not exceed 3000mg of Tylenol per day. Ex: (hydrocodone 5/325g= 325mg of Tylenol)  4. You may resume the medication you were taking prior to surgery. Pain medication may change the effects of any antidepressant medication you are taking. If you have any questions about possible interactions between your regular medications and the pain medication you should consult the physician who prescribes your regular medications. Follow Up Appointment:  If you are unsure of your follow-up appointment date and time, please call [phone]. Please let our  know you are scheduling a post-op appointment. Most appointments should be between 7-14 days after your surgery. Physical Therapy:    []    If you already have a therapy appointment, please be sure to attend your sessions as scheduled. []   Physical Therapy will be discussed with you at your first follow-up appointment with Dr. Low Trinidad. You dont need to begin physical therapy prior to that.    []  Begin physical therapy with your Home Health Physical Therapy. This will be set up         for your before you leave the hospital.    [x]  You do not require Physical Therapy. Important Signs and Symptoms:    If any of the following signs and symptoms occurs, you should contact Dr. Low Trinidad office. Please be advised if a problem arises which you feel requires immediate medical attention or you are unable to contact Dr. Low Trinidad office you should seek immediate medical attention. Signs and symptoms to watch for include:    1.  A sudden increase in swelling and/or redness or warmth at the area your surgery was performed which isnt relieved by rest, ice, and elevation. 2. Oral temperature greater than 101 degrees for 12 hours or more which isnt relieved by an increase in fluid intake and taking two Tylenol every 4-6 hours. 3. Excessive drainage from your incisions, or drainage which hasnt stopped by 72 hours after your surgery. 4. Fever, chills, shortness of breath, chest pain, nausea, vomiting or other signs and symptoms which are of concern to you. DISCHARGE SUMMARY from Nurse    PATIENT INSTRUCTIONS:    After general anesthesia or intravenous sedation, for 24 hours or while taking prescription Narcotics:  · Limit your activities  · Do not drive and operate hazardous machinery  · Do not make important personal or business decisions  · Do  not drink alcoholic beverages  · If you have not urinated within 8 hours after discharge, please contact your surgeon on call. Report the following to your surgeon:  · Excessive pain, swelling, redness or odor of or around the surgical area  · Temperature over 100.5  · Nausea and vomiting lasting longer than 4 hours or if unable to take medications  · Any signs of decreased circulation or nerve impairment to extremity: change in color, persistent  numbness, tingling, coldness or increase pain  · Any questions    What to do at Home:  Recommended activity: Activity as tolerated, Ambulate in house and No driving while on analgesics,     If you experience any of the following symptoms above, please follow up with Dr. Mark Whelan. *  Please give a list of your current medications to your Primary Care Provider. *  Please update this list whenever your medications are discontinued, doses are      changed, or new medications (including over-the-counter products) are added. *  Please carry medication information at all times in case of emergency situations.     These are general instructions for a healthy lifestyle:    No smoking/ No tobacco products/ Avoid exposure to second hand smoke  Surgeon General's Warning:  Quitting smoking now greatly reduces serious risk to your health. Obesity, smoking, and sedentary lifestyle greatly increases your risk for illness    A healthy diet, regular physical exercise & weight monitoring are important for maintaining a healthy lifestyle    You may be retaining fluid if you have a history of heart failure or if you experience any of the following symptoms:  Weight gain of 3 pounds or more overnight or 5 pounds in a week, increased swelling in our hands or feet or shortness of breath while lying flat in bed. Please call your doctor as soon as you notice any of these symptoms; do not wait until your next office visit. Recognize signs and symptoms of STROKE:    F-face looks uneven    A-arms unable to move or move unevenly    S-speech slurred or non-existent    T-time-call 911 as soon as signs and symptoms begin-DO NOT go       Back to bed or wait to see if you get better-TIME IS BRAIN. Warning Signs of HEART ATTACK     Call 911 if you have these symptoms:   Chest discomfort. Most heart attacks involve discomfort in the center of the chest that lasts more than a few minutes, or that goes away and comes back. It can feel like uncomfortable pressure, squeezing, fullness, or pain.  Discomfort in other areas of the upper body. Symptoms can include pain or discomfort in one or both arms, the back, neck, jaw, or stomach.  Shortness of breath with or without chest discomfort.  Other signs may include breaking out in a cold sweat, nausea, or lightheadedness. Don't wait more than five minutes to call 911 - MINUTES MATTER! Fast action can save your life. Calling 911 is almost always the fastest way to get lifesaving treatment. Emergency Medical Services staff can begin treatment when they arrive -- up to an hour sooner than if someone gets to the hospital by car. The discharge information has been reviewed with the patient and caregiver.   The patient and caregiver verbalized understanding. Discharge medications reviewed with the patient and caregiver and appropriate educational materials and side effects teaching were provided.   ___________________________________________________________________________________________________________________________________

## 2018-12-13 NOTE — INTERVAL H&P NOTE
H&P Update:  Bridgette Au was seen and examined. History and physical has been reviewed. The patient has been examined. There have been no significant clinical changes since the completion of the originally dated History and Physical.  Patient identified by surgeon; surgical site was confirmed by patient and surgeon.     Signed By: Mat Bullard MD     December 13, 2018 9:53 AM

## 2018-12-13 NOTE — PERIOP NOTES
[facility] med list reviewed, no duplicates. D/C instructions reviewed, opportunity for questions. Unable to sign instructions, computer not working. Agnieszka Lockett- family member, acknowledges receipt of instructions.

## 2018-12-13 NOTE — OP NOTES
ENDOSCOPIC CUBITAL TUNNEL  RELEASE     Patient: Anny Sheppard MRN: [id number]  SSN: [id number]    YOB: 1950  Age: 70 y.o. Sex: male          Date of Procedure: 12/13/2018     Preoperative Diagnosis:  Cubital Tunnel Syndrome    Postoperative Diagnosis:  Cubital Tunnel Syndrome     Procedure: right Endoscopic Cubital Tunnel Release    Surgeon:  Franco Avendaño III, MD     Assistant: Elle Urbano PA-C    Anesthesia: General    Estimated Blood Loss: Minimal    Tourniquet Time:   15   minutes at 250mmHg. Findings: Same    Specimens: None    Complications: None    Indications: This is a 70y.o. year-old male who presents with known Cubital Tunnel Syndrome documented by EMGs and clinical exam who now presents for surgical correction due to inability to treat the patients problem conservatively. DESCRIPTION OF PROCEDURE: The patient was brought to the  operating theater and after adequate anesthesia, the correct upper  extremity was prepped and draped in the typical sterile fashion. The  limb was exsanguinated with an Esmarch bandage and the tourniquet  inflated to 250 mmHg. A 1 inch long straight incision was placed just  behind the medial epicondyle over the cubital tunnel. The incision was  taken down to the subcutaneous tissue to the fascia just above the  ulnar nerve. Using a curved tenotomy scissors the soft tissue  was dissected off the superficial fascia distally and proximally and then  I used Terry scissors to dissect soft tissue off the fascia superficially  for a distance of about 8 cm distally and 8 cm proximally. I went back  to the cubital tunnel and using the pickups and tenotomy scissors, I  opened up the sheath around the ulnar nerve( Osbornes fascia). Dissection was carried  out circumferentially and it was freed in the cubital tunnel.  I used the  large dilator on the [employer] instrumentation system and dilated  the sheath for the ulnar nerve distally to the full depth and  proximally to the full depth. I then assembled the upper and lower  endoscopic cubital tunnel release guides and put lower part underneath  the sheath of the cubital tunnel and once it was underneath the upper  guide was then on top of the fascia. This was then advanced down  until it was fully seated. I placed the scope in the upper guide and  viewed the fascia from above, which showed good view of the fascia  entirely. I then viewed from the lower guide and looked upwards at the  fascia which showed the fascia throughout as well with no nerve  entrapment. I then rotated the cannula around and through the small  slit in the back you could see the ulnar nerve protected and out of  harm's way. The antegrade knife was then placed in the knife channel  of the guide and advancing the scope at the same time as knife it was  advanced completely to the distal extent with full release of the fascia. The guide was then removed and the exact same technique was  carried out proximally going in the upper arm. Once it was released  fully I could place the guide system back in the same area with no  tension as there was before. There was no subluxation of the ulnar  nerve with flexion. The wound was then irrigated out. The skin was  closed with inverted 4-0 Monocryl, and then Mastisol was applied to  the skin and half-inch Steri-Strips were applied. Approximately 10 mL  of 0.25% Marcaine with epinephrine was injected in the skin, distal and  proximal. This was then dressed with 4 x 4's and an Ace wrap, and the  patient was then awakened from general anesthesia. The tourniquet  was deflated and then returned to the recovery room awake, in stable  condition. All instrument, sponge and needle counts were correct.           Laura Lemons MD  12/13/2018

## 2018-12-19 ENCOUNTER — HOSPITAL ENCOUNTER (OUTPATIENT)
Dept: INFUSION THERAPY | Age: 68
Discharge: HOME | End: 2018-12-19
Payer: MEDICARE

## 2018-12-19 VITALS
RESPIRATION RATE: 18 BRPM | OXYGEN SATURATION: 96 % | SYSTOLIC BLOOD PRESSURE: 138 MMHG | TEMPERATURE: 98 F | DIASTOLIC BLOOD PRESSURE: 90 MMHG | HEART RATE: 86 BPM

## 2018-12-19 PROCEDURE — 74011250636 HC RX REV CODE- 250/636: Performed by: INTERNAL MEDICINE

## 2018-12-19 PROCEDURE — 96372 THER/PROPH/DIAG INJ SC/IM: CPT

## 2018-12-19 RX ADMIN — OMALIZUMAB 150 MG: 202.5 INJECTION, SOLUTION SUBCUTANEOUS at 09:45

## 2018-12-19 RX ADMIN — OMALIZUMAB 150 MG: 202.5 INJECTION, SOLUTION SUBCUTANEOUS at 09:47

## 2018-12-19 NOTE — PROGRESS NOTES
[facility] Progress Note    Date: 2018    Name: Levi Jones              MRN: [id number]              : 1950      Xolair Injection       Mr. Lesly Paige was assessed and education was provided. Mr. Elie Post vitals were reviewed. Visit Vitals  /90 (BP 1 Location: Left arm, BP Patient Position: Sitting)   Pulse 86   Temp 98 °F (36.7 °C)   Resp 18   SpO2 96%              Xolair 300 mg SQ given in two equally divided injections.      Xolair 150 mg SQ given in patient's right upper arm and band aid applied.      Xolair 150 mg SQ given in patient's left upper arm and band aid applied to site.      Mr. Rey tolerated injections, and had no complaints at this time.       Mr. Lesly Paige was discharged from [place] [address] in stable condition at 8009. He is to return on 19 at 1000 for his next appointment. Armband removed and shredded.      Shreon Vegas RN  2018  9:54 AM

## 2019-01-02 ENCOUNTER — HOSPITAL ENCOUNTER (OUTPATIENT)
Dept: INFUSION THERAPY | Age: 69
Discharge: HOME | End: 2019-01-02
Payer: MEDICARE

## 2019-01-02 VITALS
OXYGEN SATURATION: 98 % | DIASTOLIC BLOOD PRESSURE: 91 MMHG | HEART RATE: 109 BPM | RESPIRATION RATE: 18 BRPM | SYSTOLIC BLOOD PRESSURE: 145 MMHG | TEMPERATURE: 98.9 F

## 2019-01-02 PROCEDURE — 96372 THER/PROPH/DIAG INJ SC/IM: CPT

## 2019-01-02 PROCEDURE — 74011250636 HC RX REV CODE- 250/636: Performed by: INTERNAL MEDICINE

## 2019-01-02 RX ADMIN — OMALIZUMAB 150 MG: 150 INJECTION, SOLUTION SUBCUTANEOUS at 13:39

## 2019-01-02 RX ADMIN — OMALIZUMAB 150 MG: 150 INJECTION, SOLUTION SUBCUTANEOUS at 13:38

## 2019-01-02 NOTE — PROGRESS NOTES
[facility] Progress Note Date: 2019 Name: zO Servin MRN: [id number] : 1950 Mr. Nallely Bruno arrived to [facility] at [phone]. Mr. Nallely Bruno was assessed and education was provided. Mr. Eri Saeed vitals were reviewed. Visit Vitals BP (!) 145/91 (BP 1 Location: Left arm, BP Patient Position: Sitting) Pulse (!) 109 Temp 98.9 °F (37.2 °C) Resp 18 SpO2 98% 150 mg xolair was administered as ordered SQ in patient's Right upper arm . 150 mg xolair was administered as ordered SQ in patient's left upper arm. Mr. Nallely Bruno tolerated well without complaints. Mr. Nallely Bruno was discharged from [place] [address] in stable condition at [phone]. He is to return on 2019 at 1300 for his next appointment. Chito Espinosa RN 
2019

## 2019-01-15 ENCOUNTER — TELEPHONE (OUTPATIENT)
Dept: FAMILY MEDICINE CLINIC | Age: 69
End: 2019-01-15

## 2019-01-15 NOTE — TELEPHONE ENCOUNTER
Pt called stating that he is having drainage and seeping from his surgical site. He had cubital tunnel surgery 12/13 by Dr. Kamla Palacios. He also reports that the site feels 'hot to touch'. I asked pt if he had called the surgeon. He stated 'no'. I advised him dr feldman is out of the office, but I could schedule him with Dr. Louisa Harrell. He said he would contact the surgeon and call back if needed.

## 2019-01-16 ENCOUNTER — HOSPITAL ENCOUNTER (OUTPATIENT)
Dept: INFUSION THERAPY | Age: 69
Discharge: HOME | End: 2019-01-16
Payer: MEDICARE

## 2019-01-16 VITALS
DIASTOLIC BLOOD PRESSURE: 75 MMHG | RESPIRATION RATE: 18 BRPM | SYSTOLIC BLOOD PRESSURE: 145 MMHG | HEART RATE: 110 BPM | OXYGEN SATURATION: 98 % | TEMPERATURE: 98.6 F

## 2019-01-16 PROCEDURE — 96372 THER/PROPH/DIAG INJ SC/IM: CPT

## 2019-01-16 PROCEDURE — 74011250636 HC RX REV CODE- 250/636: Performed by: INTERNAL MEDICINE

## 2019-01-16 RX ADMIN — OMALIZUMAB 150 MG: 150 INJECTION, SOLUTION SUBCUTANEOUS at 14:14

## 2019-01-16 RX ADMIN — OMALIZUMAB 150 MG: 150 INJECTION, SOLUTION SUBCUTANEOUS at 14:11

## 2019-01-16 NOTE — PROGRESS NOTES
[facility] Progress Note Date: 2019 Name: Rafael Haider MRN: [id number] : 1950 Xolair Injection Mr. Hallie Valenzuela was assessed and education was provided. Mr. Rafat Cruz vitals were reviewed. Visit Vitals /75 (BP 1 Location: Left arm, BP Patient Position: Sitting) Pulse (!) 110 Temp 98.6 °F (37 °C) Resp 18 SpO2 98% Xolair 300 mg SQ given in 2 equally dived doses of 150 mg per orders. Xolair 150 mg SQ given in patient's Right abdomin and band aid applied to site. Xolair 150 mg 150 mg SQ given in patient's left abdomin and band aid applied to site. Mr. Hallie Valenzuela tolerated injections, and had no complaints at this time. Mr. Hallie Valenzuela was discharged from [place] [address] in stable condition at 1420. He is to return on 19 at 1030 for his next appointment. Armband removed and shredded.   
 
Taylor Stein RN 
2019 
2:46 PM

## 2019-01-30 ENCOUNTER — HOSPITAL ENCOUNTER (OUTPATIENT)
Dept: INFUSION THERAPY | Age: 69
End: 2019-01-30
Payer: MEDICARE

## 2019-02-05 ENCOUNTER — HOSPITAL ENCOUNTER (OUTPATIENT)
Dept: INFUSION THERAPY | Age: 69
Discharge: HOME | End: 2019-02-05
Payer: MEDICARE

## 2019-02-05 VITALS
HEART RATE: 89 BPM | TEMPERATURE: 97.8 F | OXYGEN SATURATION: 98 % | SYSTOLIC BLOOD PRESSURE: 133 MMHG | RESPIRATION RATE: 18 BRPM | DIASTOLIC BLOOD PRESSURE: 72 MMHG

## 2019-02-05 PROCEDURE — 74011250636 HC RX REV CODE- 250/636: Performed by: INTERNAL MEDICINE

## 2019-02-05 PROCEDURE — 96372 THER/PROPH/DIAG INJ SC/IM: CPT

## 2019-02-05 RX ADMIN — OMALIZUMAB 150 MG: 150 INJECTION, SOLUTION SUBCUTANEOUS at 10:40

## 2019-02-05 RX ADMIN — OMALIZUMAB 150 MG: 150 INJECTION, SOLUTION SUBCUTANEOUS at 10:43

## 2019-02-05 NOTE — PROGRESS NOTES
[facility] Progress Note Date: 2019 Name: Sergey Hein MRN: [id number] : 1950 Mr. Palomo Peres arrived to [facility] at [phone]. Mr. Palomo Peres was assessed and education was provided. Mr. Marquise Ya vitals were reviewed. Visit Vitals /72 (BP 1 Location: Left arm, BP Patient Position: Sitting) Pulse 89 Temp 97.8 °F (36.6 °C) Resp 18 SpO2 98% 150 mg xolair was administered as ordered SQ in patient's Left upper arm . 150 mg xolair was administered as ordered SQ in patient's Right upper arm. Mr. Palomo Peres tolerated well without complaints. Mr. Palomo Peres was discharged from [place] [address] in stable condition at 1045. He is to return on 19 at 56 for his next appointment. Ruthie Sanchez RN 2019

## 2019-02-13 ENCOUNTER — APPOINTMENT (OUTPATIENT)
Dept: INFUSION THERAPY | Age: 69
End: 2019-02-13
Payer: MEDICARE

## 2019-02-19 ENCOUNTER — HOSPITAL ENCOUNTER (OUTPATIENT)
Dept: INFUSION THERAPY | Age: 69
Discharge: HOME | End: 2019-02-19
Payer: MEDICARE

## 2019-02-19 VITALS
SYSTOLIC BLOOD PRESSURE: 147 MMHG | HEART RATE: 88 BPM | RESPIRATION RATE: 18 BRPM | DIASTOLIC BLOOD PRESSURE: 85 MMHG | OXYGEN SATURATION: 99 % | TEMPERATURE: 98.2 F

## 2019-02-19 PROCEDURE — 96372 THER/PROPH/DIAG INJ SC/IM: CPT

## 2019-02-19 PROCEDURE — 74011250636 HC RX REV CODE- 250/636: Performed by: INTERNAL MEDICINE

## 2019-02-19 RX ADMIN — OMALIZUMAB 150 MG: 150 INJECTION, SOLUTION SUBCUTANEOUS at 10:42

## 2019-02-19 RX ADMIN — OMALIZUMAB 150 MG: 150 INJECTION, SOLUTION SUBCUTANEOUS at 10:44

## 2019-02-19 NOTE — PROGRESS NOTES
[facility] Short Consult Note (Labs/Type & Cross/Portflush/Antibiotic/Non-Chemo injections) Date: 2019 Name: Humble Restrepo MRN: [id number] : 1950 Treatment: Xolair Injection Mr. Rigo William was assessed and education was provided. Denies any reaction from last injection Mr. Jermaine Sparrow vitals were reviewed and patient was observed for 5 minutes prior to treatment. Visit Vitals /85 (BP 1 Location: Left arm, BP Patient Position: Sitting) Pulse 88 Temp 98.2 °F (36.8 °C) Resp 18 SpO2 99% Xolair 300 mg SQ given in two equally divided injections. 
   
Xolair 150 mg SQ given in patient's right upper arm and band aid applied. 
   
Xolair 150 mg SQ given in patient's left upper arm and band aid applied to site. 
   
Mr. Rey tolerated injections, and had no complaints at this time. Mr. Rigo William was discharged from [place] [address] in stable condition at 1050 He is to return on 3/5/19 at 0930 for his next appointment for [facility] [address] Harper[address] Peterson Armband removed and shredded . Daya Ashraf RN 2019 
10:41 AM

## 2019-02-27 ENCOUNTER — APPOINTMENT (OUTPATIENT)
Dept: INFUSION THERAPY | Age: 69
End: 2019-02-27
Payer: MEDICARE

## 2019-03-05 ENCOUNTER — HOSPITAL ENCOUNTER (OUTPATIENT)
Dept: INFUSION THERAPY | Age: 69
Discharge: HOME | End: 2019-03-05
Payer: MEDICARE

## 2019-03-05 VITALS
DIASTOLIC BLOOD PRESSURE: 85 MMHG | SYSTOLIC BLOOD PRESSURE: 138 MMHG | TEMPERATURE: 98.9 F | HEART RATE: 98 BPM | RESPIRATION RATE: 18 BRPM | OXYGEN SATURATION: 99 %

## 2019-03-05 PROCEDURE — 96372 THER/PROPH/DIAG INJ SC/IM: CPT

## 2019-03-05 PROCEDURE — 74011250636 HC RX REV CODE- 250/636: Performed by: INTERNAL MEDICINE

## 2019-03-05 RX ADMIN — OMALIZUMAB 150 MG: 150 INJECTION, SOLUTION SUBCUTANEOUS at 09:36

## 2019-03-05 RX ADMIN — OMALIZUMAB 150 MG: 150 INJECTION, SOLUTION SUBCUTANEOUS at 09:37

## 2019-03-05 NOTE — PROGRESS NOTES
[facility] Short Consult Note                  (Labs/Type & Cross/Portflush/Antibiotic/Non-Chemo injections)      Date: 2019    Name: Rosa Zelaya    MRN: [id number]         : 1950    Treatment: [address] Injection      Mr. Aretha Hendrix was assessed and education was provided. Denies any reaction from last injection    Mr. Cesilia Rees vitals were reviewed and patient was observed for 5 minutes prior to treatment. Visit Vitals  /85 (BP 1 Location: Left arm, BP Patient Position: Sitting)   Pulse 98   Temp 98.9 °F (37.2 °C)   Resp 18   SpO2 99%       Xolair 300 mg SQ given in two equally divided injections.      Xolair 150 mg SQ given in patient's right upper arm and band aid applied.      Xolair 150 mg SQ given in patient's left upper arm and band aid applied to site.      Mr. Rey tolerated injections, and had no complaints at this time. Mr. Aretha Hendrix was discharged from [place] [address] in stable condition at [address] He is to return on 3/19/19 at 56 for his next appointment for  [address] Armband removed and shredded .     Vinny Garsia RN  2019  10:41 AM

## 2019-03-13 ENCOUNTER — APPOINTMENT (OUTPATIENT)
Dept: INFUSION THERAPY | Age: 69
End: 2019-03-13
Payer: MEDICARE

## 2019-03-19 ENCOUNTER — HOSPITAL ENCOUNTER (OUTPATIENT)
Dept: INFUSION THERAPY | Age: 69
Discharge: HOME | End: 2019-03-19
Payer: MEDICARE

## 2019-03-19 VITALS
HEART RATE: 93 BPM | DIASTOLIC BLOOD PRESSURE: 77 MMHG | RESPIRATION RATE: 18 BRPM | SYSTOLIC BLOOD PRESSURE: 132 MMHG | TEMPERATURE: 98.4 F

## 2019-03-19 PROCEDURE — 74011250636 HC RX REV CODE- 250/636: Performed by: INTERNAL MEDICINE

## 2019-03-19 PROCEDURE — 96372 THER/PROPH/DIAG INJ SC/IM: CPT

## 2019-03-19 RX ADMIN — OMALIZUMAB 150 MG: 150 INJECTION, SOLUTION SUBCUTANEOUS at 10:37

## 2019-03-19 RX ADMIN — OMALIZUMAB 150 MG: 150 INJECTION, SOLUTION SUBCUTANEOUS at 10:36

## 2019-03-19 NOTE — PROGRESS NOTES
[facility] Short Consult Note (Labs/Type & Cross/Portflush/Antibiotic/Non-Chemo injections) Date: 2019 Name: Felicia Farmer MRN: [id number] : 1950 Treatment: Xolair Injection Mr. Davon Yost was assessed and education was provided. Denies any reaction from last injection Mr. Madisyn Hylton vitals were reviewed and patient was observed for 5 minutes prior to treatment. Visit Vitals /77 (BP 1 Location: Left arm, BP Patient Position: Sitting) Pulse 93 Temp 98.4 °F (36.9 °C) Resp 18 Xolair 300 mg SQ given in two equally divided injections. 
   
Xolair 150 mg SQ given in patient's right upper arm and band aid applied. 
   
Xolair 150 mg SQ given in patient's left upper arm and band aid applied to site. 
   
Mr. Rey tolerated injections, and had no complaints at this time. Mr. Davon Yost was discharged from [place] [address] in stable condition at [address] He is to return on 19 at 1030 for his next appointment for [address] [place] [address]. Armband removed and shredded . Shay Ray RN 
2019 
10:41 AM

## 2019-03-26 ENCOUNTER — OFFICE VISIT (OUTPATIENT)
Dept: FAMILY MEDICINE CLINIC | Age: 69
End: 2019-03-26

## 2019-03-27 ENCOUNTER — APPOINTMENT (OUTPATIENT)
Dept: INFUSION THERAPY | Age: 69
End: 2019-03-27
Payer: MEDICARE

## 2019-04-02 ENCOUNTER — HOSPITAL ENCOUNTER (OUTPATIENT)
Dept: INFUSION THERAPY | Age: 69
Discharge: HOME | End: 2019-04-02
Payer: MEDICARE

## 2019-04-02 VITALS
SYSTOLIC BLOOD PRESSURE: 153 MMHG | OXYGEN SATURATION: 99 % | HEART RATE: 88 BPM | TEMPERATURE: 98.3 F | DIASTOLIC BLOOD PRESSURE: 95 MMHG | RESPIRATION RATE: 18 BRPM

## 2019-04-02 PROCEDURE — 96372 THER/PROPH/DIAG INJ SC/IM: CPT

## 2019-04-02 PROCEDURE — 74011250636 HC RX REV CODE- 250/636: Performed by: INTERNAL MEDICINE

## 2019-04-02 RX ADMIN — OMALIZUMAB 150 MG: 150 INJECTION, SOLUTION SUBCUTANEOUS at 10:36

## 2019-04-02 RX ADMIN — OMALIZUMAB 150 MG: 150 INJECTION, SOLUTION SUBCUTANEOUS at 10:38

## 2019-04-02 NOTE — PROGRESS NOTES
Xolair 150 mg [facility] Progress Note Date: 2019 Name: Kelsea Bear MRN: [id number] : 1950 Xolair injection every 2 weeks. Mr. Mey Sultana was assessed and education was provided. Mr. Adrian Estrella vitals were reviewed and patient was observed for 5 minutes prior to treatment. Visit Vitals BP (!) 153/95 (BP 1 Location: Left arm, BP Patient Position: Sitting) Pulse 88 Temp 98.3 °F (36.8 °C) Resp 18 SpO2 99% Xolair 150 mg was administered subcutaneously in back of left upper arm. No irritation noted at site, band aid applied. Xolair 150 mg was administered subcutaneously in back of right upper arm. No irritation noted at site, band aid applied. . 
 Total dose is 300 mg as ordered. Mr. Mey Sultana tolerated well, and had no complaints. Patient armband removed and shredded. Mr. Mey Sultana was discharged from [place] [address] in stable condition at 1040. He is to return on 2019 at 1030 for his next appointment for Xolair injection. Ann Marie Rutledge RN 2019 
3:28 PM

## 2019-04-17 ENCOUNTER — HOSPITAL ENCOUNTER (OUTPATIENT)
Dept: INFUSION THERAPY | Age: 69
End: 2019-04-17
Payer: MEDICARE

## 2019-04-17 ENCOUNTER — TELEPHONE (OUTPATIENT)
Dept: FAMILY MEDICINE CLINIC | Age: 69
End: 2019-04-17

## 2019-04-17 NOTE — TELEPHONE ENCOUNTER
Mr. Liu Hy called stating that the pulmonologist, Dr. Aryan Hernandez that he previously seen has left the country. He wants a referral to a new pulmonologist.   Also, that previous provider prescribed omalizumab Lamyadi Dill) 150 mg solr. He said he is out and really needs it, the only medication that works for him. He wants to know if Dr. Gracie Abbott will fill this for him.

## 2019-04-18 NOTE — TELEPHONE ENCOUNTER
Called [facility] spoke with staff there who says if the patient is willing to follow one of the other providers there he should call for an appointment and refills, called patient to ask what he plans to do no answer left message asking him to call the office back.

## 2019-04-18 NOTE — TELEPHONE ENCOUNTER
Patient called the office back had him verify his  he asked if he was planning to stay with [facility]. or did he prefer to pursue a new provider, he has made arrangements to continue with [facility]. and has an appointment with them next week.

## 2019-04-18 NOTE — TELEPHONE ENCOUNTER
Xolair is not in my toolkit. Please call the office where he had been seen as I would have expected they would have simply transferred him to the care of another pulmonologist in Dr. Skyla Torres office or at least made arrangements for the [address] [place] [address] pending disposition.     MARE

## 2019-04-23 ENCOUNTER — OFFICE VISIT (OUTPATIENT)
Dept: FAMILY MEDICINE CLINIC | Age: 69
End: 2019-04-23

## 2019-04-23 ENCOUNTER — HOSPITAL ENCOUNTER (OUTPATIENT)
Dept: LAB | Age: 69
Discharge: HOME | End: 2019-04-23
Payer: MEDICARE

## 2019-04-23 VITALS
TEMPERATURE: 98.2 F | HEIGHT: 71 IN | WEIGHT: 167 LBS | RESPIRATION RATE: 14 BRPM | TEMPERATURE: 98.2 F | HEART RATE: 97 BPM | SYSTOLIC BLOOD PRESSURE: 122 MMHG | HEART RATE: 97 BPM | WEIGHT: 167 LBS | DIASTOLIC BLOOD PRESSURE: 74 MMHG | OXYGEN SATURATION: 97 % | BODY MASS INDEX: 23.38 KG/M2 | BODY MASS INDEX: 23.38 KG/M2 | HEIGHT: 71 IN | DIASTOLIC BLOOD PRESSURE: 74 MMHG | RESPIRATION RATE: 14 BRPM | SYSTOLIC BLOOD PRESSURE: 122 MMHG | OXYGEN SATURATION: 97 %

## 2019-04-23 DIAGNOSIS — J45.40 MODERATE PERSISTENT ASTHMA WITHOUT COMPLICATION: ICD-10-CM

## 2019-04-23 DIAGNOSIS — J30.1 ALLERGIC RHINITIS DUE TO POLLEN: ICD-10-CM

## 2019-04-23 DIAGNOSIS — R00.0 RACING HEART BEAT: ICD-10-CM

## 2019-04-23 DIAGNOSIS — E55.9 VITAMIN D DEFICIENCY: ICD-10-CM

## 2019-04-23 DIAGNOSIS — Z00.00 MEDICARE ANNUAL WELLNESS VISIT, SUBSEQUENT: Primary | ICD-10-CM

## 2019-04-23 DIAGNOSIS — R00.0 RACING HEART BEAT: Primary | ICD-10-CM

## 2019-04-23 DIAGNOSIS — K21.9 GASTROESOPHAGEAL REFLUX DISEASE WITHOUT ESOPHAGITIS: ICD-10-CM

## 2019-04-23 DIAGNOSIS — I49.3 VENTRICULAR ECTOPY: ICD-10-CM

## 2019-04-23 PROBLEM — F32.A DEPRESSION: Status: ACTIVE | Noted: 2018-12-11

## 2019-04-23 PROBLEM — G56.21 CUBITAL TUNNEL SYNDROME ON RIGHT: Status: ACTIVE | Noted: 2018-12-11

## 2019-04-23 LAB
ATRIAL RATE: 74 BPM
CALCULATED P AXIS, ECG09: 65 DEGREES
CALCULATED R AXIS, ECG10: 47 DEGREES
CALCULATED T AXIS, ECG11: 59 DEGREES
DIAGNOSIS, 93000: NORMAL
P-R INTERVAL, ECG05: 140 MS
Q-T INTERVAL, ECG07: 386 MS
QRS DURATION, ECG06: 78 MS
QTC CALCULATION (BEZET), ECG08: 428 MS
VENTRICULAR RATE, ECG03: 74 BPM

## 2019-04-23 PROCEDURE — 93005 ELECTROCARDIOGRAM TRACING: CPT

## 2019-04-23 RX ORDER — MONTELUKAST SODIUM 10 MG/1
10 TABLET ORAL DAILY
Qty: 90 TAB | Refills: 4 | Status: SHIPPED | OUTPATIENT
Start: 2019-04-23 | End: 2021-05-26 | Stop reason: SDUPTHER

## 2019-04-23 NOTE — PATIENT INSTRUCTIONS
Medicare Wellness Visit, Male The best way to live healthy is to have a lifestyle where you eat a well-balanced diet, exercise regularly, limit alcohol use, and quit all forms of tobacco/nicotine, if applicable. Regular preventive services are another way to keep healthy. Preventive services (vaccines, screening tests, monitoring & exams) can help personalize your care plan, which helps you manage your own care. Screening tests can find health problems at the earliest stages, when they are easiest to treat. [address] [facility] follows the current, evidence-based guidelines published by the [facility] Cresencio Digna ([facility]) when recommending preventive services for our patients. Because we follow these guidelines, sometimes recommendations change over time as research supports it. (For example, a prostate screening blood test is no longer routinely recommended for men with no symptoms.) Of course, you and your doctor may decide to screen more often for some diseases, based on your risk and co-morbidities (chronic disease you are already diagnosed with). Preventive services for you include: - Medicare offers their members a free annual wellness visit, which is time for you and your primary care provider to discuss and plan for your preventive service needs. Take advantage of this benefit every year! 
-All adults over age 72 should receive the recommended pneumonia vaccines. Current USPSTF guidelines recommend a series of two vaccines for the best pneumonia protection.  
-All adults should have a flu vaccine yearly and an ECG.  All adults age 61 and older should receive a shingles vaccine once in their lifetime.   
-All adults age 38-68 who are overweight should have a diabetes screening test once every three years.  
-Other screening tests & preventive services for persons with diabetes include: an eye exam to screen for diabetic retinopathy, a kidney function test, a foot exam, and stricter control over your cholesterol.  
-Cardiovascular screening for adults with routine risk involves an electrocardiogram (ECG) at intervals determined by the provider.  
-Colorectal cancer screening should be done for adults age 54-65 with no increased risk factors for colorectal cancer. There are a number of acceptable methods of screening for this type of cancer. Each test has its own benefits and drawbacks. Discuss with your provider what is most appropriate for you during your annual wellness visit. The different tests include: colonoscopy (considered the best screening method), a fecal occult blood test, a fecal DNA test, and sigmoidoscopy. 
-All adults born between [facility] should be screened once for Hepatitis C. 
-An Abdominal Aortic Aneurysm (AAA) Screening is recommended for men age 73-68 who has ever smoked in their lifetime. Here is a list of your current Health Maintenance items (your personalized list of preventive services) with a due date: 
Health Maintenance Topic Date Due  Shingrix Vaccine Age 50> (1 of 2) 08/02/1999  GLAUCOMA SCREENING Q2Y  12/01/2015  MEDICARE YEARLY EXAM  04/06/2019  Influenza Age 5 to Adult  08/01/2019  COLONOSCOPY  06/18/2020  
 DTaP/Tdap/Td series (2 - Td) 09/09/2020  Hepatitis C Screening  Completed  Pneumococcal 65+ years  Completed Call your insurance company to verify their coverage of Shingrix (the new shingles vaccine). How much will they expect you to pay? Will they pay at the office or only at the pharmacy? Then call our office for appropriate assistance. Shingles Vaccine: What You Need to Know What is shingles? Shingles is a painful skin rash, often with blisters. It is also called herpes zoster, or just zoster. A shingles rash usually appears on one side of the face or body and lasts from 2 to 4 weeks. Its main symptom is pain, which can be quite severe. Other symptoms of shingles can include fever, headache, chills and upset stomach. Very rarely, a shingles infection can lead to pneumonia, hearing problems, blindness, brain inflammation (encephalitis) or death. For about 1 person in 5, severe pain can continue even long after the rash clears up. This is called post-herpetic neuralgia. Shingles is caused by the varicella zoster virus, the same virus that causes chickenpox. Only someone who has had chickenpox-or, rarely, has gotten chickenpox vaccine-can get shingles. The virus stays in your body, and can cause shingles many years later. You can't catch shingles from another person with shingles. However, a person who has never had chickenpox (or chickenpox vaccine) could get chickenpox from someone with shingles. This is not very common. Shingles is far more common in people 48years of age and older than in younger people. It is also more common in people whose immune systems are weakened because of a disease such as cancer, or drugs such as steroids or chemotherapy. At least 1 million people a year in the [facility] get shingles. Well Visit, Over 72: Care Instructions Your Care Instructions Physical exams can help you stay healthy. Your doctor has checked your overall health and may have suggested ways to take good care of yourself. He or she also may have recommended tests. At home, you can help prevent illness with healthy eating, regular exercise, and other steps. Follow-up care is a key part of your treatment and safety. Be sure to make and go to all appointments, and call your doctor if you are having problems. It's also a good idea to know your test results and keep a list of the medicines you take. How can you care for yourself at home? · Reach and stay at a healthy weight. This will lower your risk for many problems, such as obesity, diabetes, heart disease, and high blood pressure. · Get at least 30 minutes of exercise on most days of the week. Walking is a good choice. You also may want to do other activities, such as running, swimming, cycling, or playing tennis or team sports. · Do not smoke. Smoking can make health problems worse. If you need help quitting, talk to your doctor about stop-smoking programs and medicines. These can increase your chances of quitting for good. · Protect your skin from too much sun. When you're outdoors from 10 a.m. to 4 p.m., stay in the shade or cover up with clothing and a hat with a wide brim. Wear sunglasses that block UV rays. Even when it's cloudy, put broad-spectrum sunscreen (SPF 30 or higher) on any exposed skin. · See a dentist one or two times a year for checkups and to have your teeth cleaned. · Wear a seat belt in the car. · Limit alcohol to 2 drinks a day for men and 1 drink a day for women. Too much alcohol can cause health problems. Follow your doctor's advice about when to have certain tests. These tests can spot problems early. For men and women · Cholesterol. Your doctor will tell you how often to have this done based on your overall health and other things that can increase your risk for heart attack and stroke. · Blood pressure. Have your blood pressure checked during a routine doctor visit. Your doctor will tell you how often to check your blood pressure based on your age, your blood pressure results, and other factors. · Diabetes. Ask your doctor whether you should have tests for diabetes. · Vision. Experts recommend that you have yearly exams for glaucoma and other age-related eye problems. · Hearing. Tell your doctor if you notice any change in your hearing. You can have tests to find out how well you hear. · Colon cancer tests. Keep having colon cancer tests as your doctor recommends. You can have one of several types of tests. · Heart attack and stroke risk.  At least every 4 to 6 years, you should have your risk for heart attack and stroke assessed. Your doctor uses factors such as your age, blood pressure, cholesterol, and whether you smoke or have diabetes to show what your risk for a heart attack or stroke is over the next 10 years. · Osteoporosis. Talk to your doctor about whether you should have a bone density test to find out whether you have thinning bones. Also ask your doctor about whether you should take calcium and vitamin D supplements. For women · Pap test and pelvic exam. You may no longer need a Pap test. Talk with your doctor about whether to stop or continue to have Pap tests. · Breast exam and mammogram. Ask how often you should have a mammogram, which is an X-ray of your breasts. A mammogram can spot breast cancer before it can be felt and when it is easiest to treat. · Thyroid disease. Talk to your doctor about whether to have your thyroid checked as part of a regular physical exam. Women have an increased chance of a thyroid problem. For men · Prostate exam. Talk to your doctor about whether you should have a blood test (called a PSA test) for prostate cancer. Experts disagree on whether men should have this test. Some experts recommend that you discuss the benefits and risks of the test with your doctor. · Abdominal aortic aneurysm. Ask your doctor whether you should have a test to check for an aneurysm. You may need a test if you ever smoked or if your parent, brother, sister, or child has had an aneurysm. When should you call for help? Watch closely for changes in your health, and be sure to contact your doctor if you have any problems or symptoms that concern you. Where can you learn more? Go to http://fredis-haja.info/. Enter M413 in the search box to learn more about \"Well Visit, Over 65: Care Instructions. \" Current as of: March 28, 2018 Content Version: 11.9 © 0768-3090 "[employer] Incorporated.  Care instructions adapted under license by [address] (which disclaims liability or warranty for this information). If you have questions about a medical condition or this instruction, always ask your healthcare professional. [address] any warranty or liability for your use of this information.

## 2019-04-23 NOTE — PROGRESS NOTES
This is the Subsequent Medicare Annual Wellness Exam, performed 12 months or more after the Initial AWV or the last Subsequent AWV I have reviewed the patient's medical history in detail and updated the computerized patient record. History Past Medical History:  
Diagnosis Date  Arthritis  Asthma  Back problem  Burning with urination  Cough  Depression  GERD (gastroesophageal reflux disease)  Poor appetite  Trouble in sleeping  Wheezing Past Surgical History:  
Procedure Laterality Date  ABDOMEN SURGERY PROC UNLISTED    
 hernia repair 6/20/18  HX COLONOSCOPY    
 HX ORCHIECTOMY  1999  
 left, varicocele with complications  HX OTHER SURGICAL    
 epidural injection Current Outpatient Medications Medication Sig Dispense Refill  fluticasone-salmeterol (ADVAIR DISKUS) 100-50 mcg/dose diskus inhaler Take 1 Puff by inhalation every twelve (12) hours.  omalizumab (XOLAIR) 150 mg solr by SubCUTAneous route every fourteen (14) days.  montelukast (SINGULAIR) 10 mg tablet Take 1 Tab by mouth daily. 90 Tab 4  cetirizine-psuedoePHEDrine (ZYRTEC-D) 5-120 mg per tablet Take 1 Tab by mouth daily. 90 Tab 4  
 Omeprazole delayed release (PRILOSEC D/R) 20 mg tablet Take 1 Tab by mouth daily. (Patient taking differently: Take 20 mg by mouth as needed.) 90 Tab 4  
 levalbuterol tartrate (XOPENEX) 45 mcg/actuation inhaler Take 2 Puffs by inhalation every four (4) hours as needed for Wheezing. 1 Inhaler 4  
 levalbuterol (XOPENEX) 1.25 mg/3 mL nebu 3 mL by Nebulization route every six (6) hours as needed. 1 Package 2  
 aspirin 81 mg chewable tablet Take 1 Tab by mouth daily. 90 Tab 4  
 albuterol (PROVENTIL VENTOLIN) 2.5 mg /3 mL (0.083 %) nebulizer solution 3 mL by Nebulization route every four (4) hours as needed for Wheezing. 1 Package 1  
 FA/MV-MN/MIN AA JANETT/SAW PAL (SAW PALMETO-MULTIVIT-MIN-AA-FA PO) Take 1 Tab by mouth daily.  Cholecalciferol, Vitamin D3, (VITAMIN D) 2,000 unit Cap Take 1 Cap by mouth daily. 30 Cap 11 Allergies Allergen Reactions  Latex Hives and Itching  Cashew Nut Other (comments) \"UPSET STOMACH\"  Milk Diarrhea  Other Medication Other (comments) Pt allergic to cats with respiratory, skin reactions. Pt allergic to cashews with upset stomach  Vicodin [Hydrocodone-Acetaminophen] Other (comments)  
  hallucinations Family History Problem Relation Age of Onset  Hypertension Mother  Heart Disease Mother  Arthritis-rheumatoid Mother  Hypertension Father  Heart Attack Father 48  Cancer Brother 72  
     prostate  Cancer Brother 61  
     prostate  Colon Cancer Brother  Heart Attack Brother 50  Cancer Brother 66  
     prostate  Diabetes Brother  Heart Attack Sister 61  Colon Polyps Sister  Heart Attack Brother 54  
 Heart Attack Sister 72 Social History Tobacco Use  Smoking status: Never Smoker  Smokeless tobacco: Never Used Substance Use Topics  Alcohol use: Yes Alcohol/week: 4.8 oz Types: 8 Cans of beer per week Comment: occ Patient Active Problem List  
Diagnosis Code  Vitamin D deficiency E55.9  Cough R05  Allergic rhinitis J30.9  Severe persistent asthma without complication A69.35  Lumbosacral radiculopathy due to degenerative joint disease of spine M47.27  
 Cervical radiculopathy due to degenerative joint disease of spine M47.22  Elevated PSA R97.20  BPH with obstruction/lower urinary tract symptoms N40.1, N13.8  Family history of prostate cancer Z80.42  Elevated blood pressure R03.0  Hyperlipidemia E78.5  Diastolic dysfunction Z34.17  
 Essential hypertension I10  
 Other specified abnormal immunological findings in serum R76.8  Lumbar spondylosis M47.816  Depression F32.9  
 Cubital tunnel syndrome on right G56.21  
  Cubital tunnel syndrome, right G56.21 Depression Risk Factor Screening:  
 
3 most recent PHQ Screens 8/3/2018 Little interest or pleasure in doing things Not at all Feeling down, depressed, irritable, or hopeless Not at all Total Score PHQ 2 0 Trouble falling or staying asleep, or sleeping too much - Feeling tired or having little energy - Poor appetite, weight loss, or overeating - Feeling bad about yourself - or that you are a failure or have let yourself or your family down - Trouble concentrating on things such as school, work, reading, or watching TV - Moving or speaking so slowly that other people could have noticed; or the opposite being so fidgety that others notice - Thoughts of being better off dead, or hurting yourself in some way -  
PHQ 9 Score - How difficult have these problems made it for you to do your work, take care of your home and get along with others - Alcohol Risk Factor Screening: You do not drink alcohol or very rarely. Functional Ability and Level of Safety:  
Hearing Loss Hearing is good. Activities of Daily Living The home contains: no safety equipment. Patient does total self care Fall Risk Fall Risk Assessment, last 12 mths 4/23/2019 Able to walk? Yes Fall in past 12 months? No  
TUG 9.5 seconds Abuse Screen Patient is not abused Cognitive Screening Evaluation of Cognitive Function: 
Has your family/caregiver stated any concerns about your memory: no 
Normal 
 
Patient Care Team  
Patient Care Team: 
Luiza Olea MD as PCP - [facility]) Luiza Olea MD (Family Practice) Brent Bearden MD (Pulmonary Disease) Flora Townsend MD as Surgeon (Surgery) Qasim Beaver MD (Rheumatology) Melanie Castillo MD (Orthopedic Surgery) Assessment/Plan Education and counseling provided: 
Are appropriate based on today's review and evaluation Diagnoses and all orders for this visit: 
 
 1. Medicare annual wellness visit, subsequent Health Maintenance Due Topic Date Due  Shingrix Vaccine Age 50> (1 of 2) 08/02/1999  GLAUCOMA SCREENING Q2Y  12/01/2015  MEDICARE YEARLY EXAM  04/06/2019 646 Rohan Yuen 1 year

## 2019-04-23 NOTE — PROGRESS NOTES
Raheel York is a 70 y.o. male who was seen in clinic today (4/23/2019). Assessment & Plan:       ICD-10-CM ICD-9-CM    1. Racing heart beat R00.0 785.0 EKG, 12 LEAD, INITIAL      CARDIAC HOLTER MONITOR   2. Allergic rhinitis due to pollen J30.1 477.0 montelukast (SINGULAIR) 10 mg tablet   3. Moderate persistent asthma without complication I20.41 073.41 montelukast (SINGULAIR) 10 mg tablet   4. Vitamin D deficiency E55.9 268.9    5. Gastroesophageal reflux disease without esophagitis K21.9 530.81      Episodic racing heart beat suspicious for paroxysmal dysrhythmia. Advised to call 911 should he develop chest pain, shortness of breath, dizziness pending results of evaluation. Vit d: asymptomatic. Low dose replacement does not require monitoring. GERD: Well controlled, continue present management with prn PPI. Follow-up and Dispositions    · Return for plan to follow based on results. Subjective:   Raheel York was seen today for Vitamin D Deficiency; Asthma; GERD; Allergic Rhinitis; and Cholesterol Problem    Fluttering in chest x few weeks. Episodic. No triggers. No associated symptoms. Lasts 5 minutes. Resolves spontaneously. No time pattern. No change in exercise tolerance. No prior hx. Asthma: transferred care from Dr. Poncho Mcgarry (who has left the area) to a partner within the clinic, Dr. Girma Almaguer, who has assumed management of his asthma including Xolair. No acute concerns. States reassuring visit yesterday. Reports not available for review at the time of our visit. GERD on PPI as needed, which is 1-2x/week. Well controlled. history of vit d def on low dose OTC replacement.  asymptomatic      Results for orders placed or performed during the hospital encounter of 11/28/18   CBC W/O DIFF   Result Value Ref Range    WBC 2.9 (L) 4.6 - 13.2 K/uL    RBC 4.61 (L) 4.70 - 5.50 M/uL    HGB 14.8 13.0 - 16.0 g/dL    HCT 43.3 36.0 - 48.0 %    MCV 93.9 74.0 - 97.0 FL    MCH 32.1 24.0 - 34.0 PG    MCHC 34.2 31.0 - 37.0 g/dL    RDW 12.5 11.6 - 14.5 %    PLATELET 862 935 - 990 K/uL    MPV 10.5 9.2 - 59.9 FL   METABOLIC PANEL, COMPREHENSIVE   Result Value Ref Range    Sodium 136 136 - 145 mmol/L    Potassium 5.0 3.5 - 5.5 mmol/L    Chloride 100 100 - 108 mmol/L    CO2 31 21 - 32 mmol/L    Anion gap 5 3.0 - 18 mmol/L    Glucose 105 (H) 74 - 99 mg/dL    BUN 20 (H) 7.0 - 18 MG/DL    Creatinine 1.24 0.6 - 1.3 MG/DL    BUN/Creatinine ratio 16      GFR est AA >60 >60 ml/min/1.73m2    GFR est non-AA 58 (L) >60 ml/min/1.73m2    Calcium 9.0 8.5 - 10.1 MG/DL    Bilirubin, total 0.7 0.2 - 1.0 MG/DL    ALT (SGPT) 28 16 - 61 U/L    AST (SGOT) 22 15 - 37 U/L    Alk. phosphatase 65 45 - 117 U/L    Protein, total 8.0 6.4 - 8.2 g/dL    Albumin 3.7 3.4 - 5.0 g/dL    Globulin 4.3 (H) 2.0 - 4.0 g/dL    A-G Ratio 0.9 0.8 - 1.7     EKG, 12 LEAD, INITIAL   Result Value Ref Range    Ventricular Rate 82 BPM    Atrial Rate 82 BPM    P-R Interval 134 ms    QRS Duration 76 ms    Q-T Interval 376 ms    QTC Calculation (Bezet) 439 ms    Calculated P Axis 80 degrees    Calculated R Axis 69 degrees    Calculated T Axis 55 degrees    Diagnosis       Normal sinus rhythm  Normal ECG  Confirmed by Nancy Doe MD, Mrugesh ([id number]) on 11/29/2018 1:40:13 AM        Lab Results   Component Value Date/Time    Vitamin D 25-Hydroxy 35.6 09/01/2017 10:56 AM    Vitamin D 25-Hydroxy 53 06/29/2010 03:56 PM    Vitamin D 25-Hydroxy 19 (L) 04/21/2010 08:36 PM         Outpatient Medications Marked as Taking for the 4/23/19 encounter (Office Visit) with Mariann Hernandez MD   Medication Sig Dispense Refill    fluticasone-salmeterol (ADVAIR DISKUS) 100-50 mcg/dose diskus inhaler Take 1 Puff by inhalation every twelve (12) hours.  omalizumab (XOLAIR) 150 mg solr by SubCUTAneous route every fourteen (14) days.  montelukast (SINGULAIR) 10 mg tablet Take 1 Tab by mouth daily.  90 Tab 4    cetirizine-psuedoePHEDrine (ZYRTEC-D) 5-120 mg per tablet Take 1 Tab by mouth daily. 90 Tab 4    Omeprazole delayed release (PRILOSEC D/R) 20 mg tablet Take 1 Tab by mouth daily. (Patient taking differently: Take 20 mg by mouth as needed.) 90 Tab 4    levalbuterol tartrate (XOPENEX) 45 mcg/actuation inhaler Take 2 Puffs by inhalation every four (4) hours as needed for Wheezing. 1 Inhaler 4    levalbuterol (XOPENEX) 1.25 mg/3 mL nebu 3 mL by Nebulization route every six (6) hours as needed. 1 Package 2    aspirin 81 mg chewable tablet Take 1 Tab by mouth daily. 90 Tab 4    albuterol (PROVENTIL VENTOLIN) 2.5 mg /3 mL (0.083 %) nebulizer solution 3 mL by Nebulization route every four (4) hours as needed for Wheezing. 1 Package 1    FA/MV-MN/MIN AA JANETT/SAW PAL (SAW PALMETO-MULTIVIT-MIN-AA-FA PO) Take 1 Tab by mouth daily.  Cholecalciferol, Vitamin D3, (VITAMIN D) 2,000 unit Cap Take 1 Cap by mouth daily. 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 Lifetime risk NA . Statin initiated.  Diastolic dysfunction     8/63/0163: inpatient echo (chest pain) EF 04-10% diastolic dysfunction I. Dr. Stef Connell. CCB initiated 3/6/2016. Per Dr. Darlene Truong (pulm) re comorbid asthma/COPD 9/8/2017, avoid ACE I. Favor BB and ARB.  Essential hypertension     3/6/2016: ASCVD 10 year risk 14.9 %. Lifetime risk NA. Aspirin initiated. Switched from thiazide to CCB for context of diastolic dysfunction.       Elevated PSA    BPH with obstruction/lower urinary tract symptoms    Family history of prostate cancer    Lumbosacral radiculopathy due to degenerative joint disease of spine    Cervical radiculopathy due to degenerative joint disease of spine     MRI 10/17/2018 through [address]: Multilevel DJD and uncovertebral arthritis. 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 Severe central canal stenosis at C6-7 and C5-6 with moderate narrowing at C4-5 and mild narrowing at C3-4. Bilateral neuroforaminal narrowing at C5-6 and C6-7.  Allergic rhinitis    Severe persistent asthma without complication     9/8/5991: PFT consistent with Obstructive airway disease mild in nature. 9/13/2018: Jessica Anguiano MD: FEV1 of 2.25 or 67% predicted March 2018 PFT-IgE level of 324-on Xolair  -Positive SSA, BRENDA, RF, surveillance without tx by rheumatology      Vitamin D deficiency         Allergies   Allergen Reactions    Latex Hives and Itching    Cashew Nut Other (comments)     \"UPSET STOMACH\"    Milk Diarrhea    Other Medication Other (comments)     Pt allergic to cats with respiratory, skin reactions. Pt allergic to cashews with upset stomach    Vicodin [Hydrocodone-Acetaminophen] Other (comments)     hallucinations         Patient Care Team:  Jv Sanchez MD as PCP - General (Family Practice)  Jv Sanchez MD (Family Practice)  Princess Ibarra MD (Pulmonary Disease)  Niranjan Crabtree MD as Surgeon (Surgery)  Yolanda Kennedy MD (Rheumatology)  Isabel Barrera MD (Orthopedic Surgery)    The following sections were reviewed & updated as appropriate: PMH, PSH, FH, and SH. Review of Systems   Constitutional: Negative for fever and weight loss. Respiratory: Negative for shortness of breath. Cardiovascular: Negative for chest pain and palpitations. Gastrointestinal: Negative for diarrhea. Neurological: Negative for dizziness and loss of consciousness. Psychiatric/Behavioral: The patient has insomnia (chronic, unchanged). The patient is not nervous/anxious.             Objective: Visit Vitals  /74   Pulse 97   Temp 98.2 °F (36.8 °C)   Resp 14   Ht 5' 11\" (1.803 m)   Wt 167 lb (75.8 kg)   SpO2 97%   BMI 23.29 kg/m²      Physical Exam   Constitutional: He is oriented to person, place, and time. He appears well-developed and well-nourished. No distress. HENT:   Head: Normocephalic and atraumatic. Neck: Neck supple. Cardiovascular: Normal rate, regular rhythm and normal heart sounds. Occasional extrasystoles are present. Exam reveals no gallop and no friction rub. No murmur heard. Pulmonary/Chest: Effort normal and breath sounds normal. No respiratory distress. He has no wheezes. He has no rhonchi. He has no rales. Musculoskeletal: He exhibits no edema. Lymphadenopathy:     He has no cervical adenopathy. Neurological: He is alert and oriented to person, place, and time. Skin: Skin is warm and dry. Psychiatric: He has a normal mood and affect. His behavior is normal. Judgment and thought content normal.         Disclaimer: The patient understands our medical plan. Alternatives have been explained and offered. The risks, benefits and significant side effects of all medications have been reviewed. Anticipated time course and progression of condition reviewed. All questions have been addressed. He is encouraged to employ the information provided in the after visit summary, which was reviewed. Where applicable, he is instructed to call the clinic if he has not been notified either by phone or through [address] with the results of his tests or with an appointment plan for any referrals within 1 week(s). No news is not good news; it's no news. The patient  is to call if his condition worsens or fails to improve or if significant side effects are experienced. Aspects of this note may have been generated using voice recognition software. Despite editing, there may be unrecognized errors.        Beatriz Tafoya MD

## 2019-04-23 NOTE — PROGRESS NOTES
Chief Complaint   Patient presents with    Vitamin D Deficiency    Asthma    GERD    Allergic Rhinitis    Cholesterol Problem     Pt in office for Chronic Medical condition follow up. Refills have been pended. Pharmacy verified. 1. Have you been to the ER, urgent care clinic since your last visit? Hospitalized since your last visit? No    2. Have you seen or consulted any other health care providers outside of the [address] [place] [address] since your last visit? Include any pap smears or colon screening.  No

## 2019-04-25 ENCOUNTER — HOSPITAL ENCOUNTER (OUTPATIENT)
Dept: NON INVASIVE DIAGNOSTICS | Age: 69
Discharge: HOME | End: 2019-04-25
Attending: FAMILY MEDICINE
Payer: MEDICARE

## 2019-04-25 DIAGNOSIS — R00.0 RACING HEART BEAT: ICD-10-CM

## 2019-04-25 PROCEDURE — 93226 XTRNL ECG REC<48 HR SCAN A/R: CPT

## 2019-05-01 ENCOUNTER — HOSPITAL ENCOUNTER (OUTPATIENT)
Dept: INFUSION THERAPY | Age: 69
Discharge: HOME | End: 2019-05-01
Payer: MEDICARE

## 2019-05-01 VITALS
OXYGEN SATURATION: 99 % | SYSTOLIC BLOOD PRESSURE: 145 MMHG | DIASTOLIC BLOOD PRESSURE: 83 MMHG | HEART RATE: 92 BPM | RESPIRATION RATE: 18 BRPM | TEMPERATURE: 98.3 F

## 2019-05-01 PROCEDURE — 74011250636 HC RX REV CODE- 250/636: Performed by: INTERNAL MEDICINE

## 2019-05-01 PROCEDURE — 96372 THER/PROPH/DIAG INJ SC/IM: CPT

## 2019-05-01 RX ADMIN — OMALIZUMAB 150 MG: 150 INJECTION, SOLUTION SUBCUTANEOUS at 11:13

## 2019-05-01 RX ADMIN — OMALIZUMAB 150 MG: 150 INJECTION, SOLUTION SUBCUTANEOUS at 11:12

## 2019-05-01 NOTE — PROGRESS NOTES
[facility] Short Consult Note (Labs/Type & Cross/Portflush/Antibiotic/Non-Chemo injections) Date: May 1, 2019 Name: Paulino Walton MRN: [id number] : 1950 Treatment: Xolair Injection Mr. Furman Leventhal was assessed and education was provided. Denies any reaction from last injection Mr. Netta Farr vitals were reviewed and patient was observed for 5 minutes prior to treatment. Visit Vitals /83 (BP 1 Location: Left arm, BP Patient Position: Sitting) Pulse 92 Temp 98.3 °F (36.8 °C) Resp 18 SpO2 99% Xolair 300 mg SQ given in two equally divided injections: 
   
Xolair 150 mg SQ given in patient's right upper arm and band aid applied. 
   
Xolair 150 mg SQ given in patient's left upper arm and band aid applied to site. 
   
Mr. Rey tolerated injections, and had no complaints at this time. Mr. Furman Leventhal was discharged from [place] [address] in stable condition at 1115. He is to return on 5/15/2019 at 56 for his next appointment for Xolair. Armband removed and shredded . William Herzog RN May 1, 2019 
10:41 AM

## 2019-05-15 ENCOUNTER — HOSPITAL ENCOUNTER (OUTPATIENT)
Dept: INFUSION THERAPY | Age: 69
Discharge: HOME | End: 2019-05-15
Payer: MEDICARE

## 2019-05-15 VITALS
DIASTOLIC BLOOD PRESSURE: 91 MMHG | SYSTOLIC BLOOD PRESSURE: 151 MMHG | TEMPERATURE: 98.6 F | OXYGEN SATURATION: 98 % | HEART RATE: 105 BPM | RESPIRATION RATE: 18 BRPM

## 2019-05-15 PROCEDURE — 74011250636 HC RX REV CODE- 250/636: Performed by: INTERNAL MEDICINE

## 2019-05-15 PROCEDURE — 96372 THER/PROPH/DIAG INJ SC/IM: CPT

## 2019-05-15 RX ADMIN — OMALIZUMAB 150 MG: 150 INJECTION, SOLUTION SUBCUTANEOUS at 10:34

## 2019-05-15 RX ADMIN — OMALIZUMAB 150 MG: 150 INJECTION, SOLUTION SUBCUTANEOUS at 10:35

## 2019-05-15 NOTE — PROGRESS NOTES
[facility] Short Consult Note (Labs/Type & Cross/Portflush/Antibiotic/Non-Chemo injections) Date: May 15, 2019 Name: Wilfrido Dexter MRN: [id number] : 1950 Treatment: Xolair Injection Mr. Leon Zepeda was assessed and education was provided. Denies any reaction from last injection Mr. Suman Mason vitals were reviewed and patient was observed for 5 minutes prior to treatment. Visit Vitals BP (!) 151/91 (BP 1 Location: Left arm, BP Patient Position: Sitting) Pulse (!) 105 Temp 98.6 °F (37 °C) Resp 18 SpO2 98% Xolair 300 mg SQ given in two equally divided injections: 
   
Xolair 150 mg SQ given in patient's right upper arm and band aid applied. 
   
Xolair 150 mg SQ given in patient's left upper arm and band aid applied to site. 
   
Mr. Rey tolerated injections, and had no complaints at this time. Mr. Leon Zepeda was discharged from [place] [address] in stable condition at 1045. He is to return on 2019 at 56 for his next appointment for Xolair. Armband removed and shredded . Marianne Rhodes RN May 15, 2019 
10:41 AM

## 2019-05-29 ENCOUNTER — HOSPITAL ENCOUNTER (OUTPATIENT)
Dept: INFUSION THERAPY | Age: 69
Discharge: HOME | End: 2019-05-29
Payer: MEDICARE

## 2019-05-29 VITALS
SYSTOLIC BLOOD PRESSURE: 123 MMHG | RESPIRATION RATE: 18 BRPM | TEMPERATURE: 97.3 F | DIASTOLIC BLOOD PRESSURE: 79 MMHG | HEART RATE: 100 BPM | OXYGEN SATURATION: 99 %

## 2019-05-29 PROCEDURE — 74011250636 HC RX REV CODE- 250/636: Performed by: INTERNAL MEDICINE

## 2019-05-29 PROCEDURE — 96372 THER/PROPH/DIAG INJ SC/IM: CPT

## 2019-05-29 RX ADMIN — OMALIZUMAB 150 MG: 150 INJECTION, SOLUTION SUBCUTANEOUS at 10:33

## 2019-05-29 NOTE — PROGRESS NOTES
[facility] Short Consult Note                  (Labs/Type & Cross/Portflush/Antibiotic/Non-Chemo injections)      Date: May 29, 2019    Name: Chela Greenwood    MRN: [id number]         : 1950    Treatment: Yarelis Fan Injection      Mr. Carolyn Chandler was assessed and education was provided. Denies any reaction from last injection    Mr. Rtu Terrazas vitals were reviewed and patient was observed for 5 minutes prior to treatment. Visit Vitals  /79 (BP 1 Location: Left arm, BP Patient Position: Sitting)   Pulse 100   Temp 97.3 °F (36.3 °C)   Resp 18   SpO2 99%       Xolair 300 mg SQ given in two equally divided injections:      Xolair 150 mg SQ given in patient's right upper arm and band aid applied.      Xolair 150 mg SQ given in patient's left upper arm and band aid applied to site.      Mr. Rey tolerated injections, and had no complaints at this time. Mr. Carolyn Chandler was discharged from [place] [address] in stable condition at 1035. He is to return on 2019 at 1030 for his next appointment for Xolair. Armband removed and shredded .     Sharol Soulier, RN  May 29, 2019  10:41 AM

## 2019-06-03 ENCOUNTER — TELEPHONE (OUTPATIENT)
Dept: FAMILY MEDICINE CLINIC | Age: 69
End: 2019-06-03

## 2019-06-03 NOTE — PROGRESS NOTES
Please call him to ensure he's aware that I had referred him to cardiology to explore the abnormalities on his Holter study. Perhaps he didn't see my NERIt message.  MARE

## 2019-06-12 ENCOUNTER — HOSPITAL ENCOUNTER (OUTPATIENT)
Dept: INFUSION THERAPY | Age: 69
Discharge: HOME | End: 2019-06-12
Payer: MEDICARE

## 2019-06-12 VITALS
DIASTOLIC BLOOD PRESSURE: 80 MMHG | OXYGEN SATURATION: 97 % | SYSTOLIC BLOOD PRESSURE: 128 MMHG | HEART RATE: 81 BPM | RESPIRATION RATE: 18 BRPM | TEMPERATURE: 98.4 F

## 2019-06-12 PROCEDURE — 96372 THER/PROPH/DIAG INJ SC/IM: CPT

## 2019-06-12 PROCEDURE — 74011250636 HC RX REV CODE- 250/636: Performed by: INTERNAL MEDICINE

## 2019-06-12 RX ADMIN — OMALIZUMAB 150 MG: 150 INJECTION, SOLUTION SUBCUTANEOUS at 10:42

## 2019-06-12 RX ADMIN — OMALIZUMAB 150 MG: 150 INJECTION, SOLUTION SUBCUTANEOUS at 10:44

## 2019-06-12 NOTE — PROGRESS NOTES
[facility] Short Consult Note                  (Labs/Type & Cross/Portflush/Antibiotic/Non-Chemo injections)      Date: 2019    Name: Bandar Ashton    MRN: [id number]         : 1950    Treatment: [address] Injection      Mr. Gabbie Moore was assessed and education was provided. Denies any reaction from last injection    Mr. Umang Giron vitals were reviewed and patient was observed for 5 minutes prior to treatment. Visit Vitals  /80 (BP 1 Location: Left arm, BP Patient Position: Sitting)   Pulse 81   Temp 98.4 °F (36.9 °C)   Resp 18   SpO2 97%       Xolair 300 mg SQ given in two equally divided injections:      Xolair 150 mg SQ given in patient's right upper arm and band aid applied.      Xolair 150 mg SQ given in patient's left upper arm and band aid applied to site.      Mr. Rey tolerated injections, and had no complaints at this time. Mr. Gabbie Moore was discharged from [place] [address] in stable condition at 1045  He is to return on 2019 at 1100 for his next appointment for [address] Armband removed and shredded .     Chel Carnes RN  2019  10:41 AM

## 2019-06-26 ENCOUNTER — HOSPITAL ENCOUNTER (OUTPATIENT)
Dept: INFUSION THERAPY | Age: 69
Discharge: HOME | End: 2019-06-26
Payer: MEDICARE

## 2019-06-26 VITALS
OXYGEN SATURATION: 98 % | DIASTOLIC BLOOD PRESSURE: 76 MMHG | SYSTOLIC BLOOD PRESSURE: 120 MMHG | TEMPERATURE: 98.3 F | RESPIRATION RATE: 18 BRPM | HEART RATE: 80 BPM

## 2019-06-26 PROCEDURE — 96372 THER/PROPH/DIAG INJ SC/IM: CPT

## 2019-06-26 PROCEDURE — 74011250636 HC RX REV CODE- 250/636: Performed by: INTERNAL MEDICINE

## 2019-06-26 RX ADMIN — OMALIZUMAB 150 MG: 150 INJECTION, SOLUTION SUBCUTANEOUS at 11:06

## 2019-06-26 NOTE — PROGRESS NOTES
[facility] Progress Note    Date: 2019    Name: Oz Servin    MRN: [id number]         : 1950      Mr. Nallely Bruno arrived to [facility] at 1100. Mr. Nallely Bruno was assessed and education was provided. Mr. Eri Saeed vitals were reviewed. Visit Vitals  BP (P) 120/76 (BP 1 Location: Right arm, BP Patient Position: Prone)   Pulse 80   Temp 98.3 °F (36.8 °C)   Resp 18   SpO2 98%         150 mg xolair was administered as ordered SQ in patient's Right upper arm . 150 mg xolair was administered as ordered SQ in patient's left upper arm. Mr. Nallely Bruno tolerated well without complaints. Mr. Nallely Bruno was discharged from [place] [address] in stable condition at 1110. He is to return on 7/10/19 at 1030 for his next appointment.     Chito Espinosa RN  2019

## 2019-06-27 ENCOUNTER — OFFICE VISIT (OUTPATIENT)
Dept: CARDIOLOGY CLINIC | Age: 69
End: 2019-06-27

## 2019-06-27 VITALS
HEIGHT: 71 IN | DIASTOLIC BLOOD PRESSURE: 92 MMHG | BODY MASS INDEX: 23.94 KG/M2 | OXYGEN SATURATION: 90 % | WEIGHT: 171 LBS | HEART RATE: 80 BPM | SYSTOLIC BLOOD PRESSURE: 150 MMHG

## 2019-06-27 DIAGNOSIS — R00.0 RACING HEART BEAT: Primary | ICD-10-CM

## 2019-06-27 DIAGNOSIS — R07.9 CHEST PAIN, UNSPECIFIED TYPE: ICD-10-CM

## 2019-06-27 NOTE — PROGRESS NOTES
Sera Cowan    Chief Complaint   Patient presents with    Rapid Heart Rate     NEW PATIENT     Palpitations     racing        HPI    Sera Cowan is a 70 y.o. AAM with no known coronary disease here for evaluation of chest pain and racing palpitations. As you know patient takes good care of himself and does not have very many chronic medical conditions. And reviewed his history together quite thoroughly and he denies having hypertension no diabetes no problems or cholesterol he never smoked. As you know he does have a significant family history he is got several siblings and at least half of them have had premature coronary disease in their 46s. He says his father  at age 62 of what was believed to be a myocardial infarction in his mother at age 76. As you know he has been noticing episodes of chest discomfort somewhat off and on for several months (~ Feb). They happen 2-3 times a week and are mostly when he is laying down or sitting at rest.  It is a sudden onset tightness in his chest but there are no other associated symptoms no noticeable provoking factors and it just goes away on its own. Additionally he notices palpitations and this prompted a Holter monitor which I reviewed with him today. There were no significant tachycardia or bradycardia arrhythmias but he was noted to have quite a bit of ectopy. He is mostly having ventricular ectopy but also was noted to have a short run of SVT. As you know he does not really get dizzy he is never passed out the palpitations do not really limit the things he wants to do he just noticed a sensation of his heart pounding and flipping here and there. As you know he is very active he exercises between 3 and 5 times a week at the gym this includes running. He is never been limited by exertional symptoms he is never had chest pain in the setting. He tells me he has never seen a heart doctor in the past but he has had prior cardiac testing.   I independently obtained and reviewed these reports his last echocardiogram was in 2016 he had borderline normal LV function at about 50% but type I diastolic dysfunction. He also has had several stress test in the past last was a nuclear stress test in 2016 which was normal.    Cardiac RFs: +FH premature CAD    Past Medical History:   Diagnosis Date    Arthritis     Asthma     Back problem     Burning with urination     Cough     Depression     GERD (gastroesophageal reflux disease)     Poor appetite     Trouble in sleeping     Wheezing        Past Surgical History:   Procedure Laterality Date    ABDOMEN SURGERY PROC UNLISTED      hernia repair 6/20/18    HX COLONOSCOPY      HX ORCHIECTOMY  1999    left, varicocele with complications    HX OTHER SURGICAL      epidural injection        Current Outpatient Medications   Medication Sig Dispense Refill    montelukast (SINGULAIR) 10 mg tablet Take 1 Tab by mouth daily. 90 Tab 4    fluticasone-salmeterol (ADVAIR DISKUS) 100-50 mcg/dose diskus inhaler Take 1 Puff by inhalation every twelve (12) hours.  omalizumab (XOLAIR) 150 mg solr by SubCUTAneous route every fourteen (14) days.  cetirizine-psuedoePHEDrine (ZYRTEC-D) 5-120 mg per tablet Take 1 Tab by mouth daily. 90 Tab 4    Omeprazole delayed release (PRILOSEC D/R) 20 mg tablet Take 1 Tab by mouth daily. (Patient taking differently: Take 20 mg by mouth as needed.) 90 Tab 4    levalbuterol tartrate (XOPENEX) 45 mcg/actuation inhaler Take 2 Puffs by inhalation every four (4) hours as needed for Wheezing. 1 Inhaler 4    levalbuterol (XOPENEX) 1.25 mg/3 mL nebu 3 mL by Nebulization route every six (6) hours as needed. 1 Package 2    aspirin 81 mg chewable tablet Take 1 Tab by mouth daily. 90 Tab 4    albuterol (PROVENTIL VENTOLIN) 2.5 mg /3 mL (0.083 %) nebulizer solution 3 mL by Nebulization route every four (4) hours as needed for Wheezing.  1 Package 1    FA/MV-MN/MIN AA JANETT/SAW PAL (SAW PALMETO-MULTIVIT-MIN-AA-FA PO) Take 1 Tab by mouth daily.  Cholecalciferol, Vitamin D3, (VITAMIN D) 2,000 unit Cap Take 1 Cap by mouth daily. 30 Cap 11       Allergies   Allergen Reactions    Latex Hives and Itching    Cashew Nut Other (comments)     \"UPSET STOMACH\"    Milk Diarrhea    Other Medication Other (comments)     Pt allergic to cats with respiratory, skin reactions. Pt allergic to cashews with upset stomach    Vicodin [Hydrocodone-Acetaminophen] Other (comments)     hallucinations       Social History     Socioeconomic History    Marital status:      Spouse name: Not on file    Number of children: Not on file    Years of education: Not on file    Highest education level: Not on file   Occupational History    Not on file   Social Needs    Financial resource strain: Not on file    Food insecurity:     Worry: Not on file     Inability: Not on file    Transportation needs:     Medical: Not on file     Non-medical: Not on file   Tobacco Use    Smoking status: Never Smoker    Smokeless tobacco: Never Used   Substance and Sexual Activity    Alcohol use:  Yes     Alcohol/week: 4.8 oz     Types: 8 Cans of beer per week     Comment: occ    Drug use: No    Sexual activity: Yes     Partners: Female   Lifestyle    Physical activity:     Days per week: Not on file     Minutes per session: Not on file    Stress: Not on file   Relationships    Social connections:     Talks on phone: Not on file     Gets together: Not on file     Attends Restorationist service: Not on file     Active member of club or organization: Not on file     Attends meetings of clubs or organizations: Not on file     Relationship status: Not on file    Intimate partner violence:     Fear of current or ex partner: Not on file     Emotionally abused: Not on file     Physically abused: Not on file     Forced sexual activity: Not on file   Other Topics Concern    Not on file   Social History Narrative    Not on file Review of Systems    14 pt Review of Systems is negative unless otherwise mentioned in the HPI. No snoring, no hypoK, no thryoid probs    Wt Readings from Last 3 Encounters:   06/27/19 77.6 kg (171 lb)   04/23/19 75.8 kg (167 lb)   04/23/19 75.8 kg (167 lb)     Temp Readings from Last 3 Encounters:   06/26/19 98.3 °F (36.8 °C)   06/12/19 98.4 °F (36.9 °C)   05/29/19 97.3 °F (36.3 °C)     BP Readings from Last 3 Encounters:   06/27/19 (!) 150/92   06/26/19 120/76   06/12/19 128/80     Pulse Readings from Last 3 Encounters:   06/27/19 80   06/26/19 80   06/12/19 81     Physical Exam:    Visit Vitals  BP (!) 150/92   Pulse 80   Ht 5' 11\" (1.803 m)   Wt 77.6 kg (171 lb)   SpO2 90%   BMI 23.85 kg/m²      Physical Exam   Constitutional: He is oriented to person, place, and time. He appears well-developed and well-nourished. HENT:   Head: Normocephalic and atraumatic. Eyes: Pupils are equal, round, and reactive to light. EOM are normal. No scleral icterus. Neck: No JVD present. Cardiovascular: Normal rate, regular rhythm, normal heart sounds and intact distal pulses. Exam reveals no gallop and no friction rub. No murmur heard. Pulmonary/Chest: Effort normal and breath sounds normal. No respiratory distress. He has no wheezes. He has no rales. He exhibits no tenderness. Abdominal: Soft. Bowel sounds are normal.   Musculoskeletal: He exhibits no edema. Neurological: He is alert and oriented to person, place, and time. Skin: Skin is warm and dry. No rash noted. Psychiatric: He has a normal mood and affect.        EKG today shows: NSR, normal axis and intervals, no ST segment abnormalities    Lab Results   Component Value Date/Time    Cholesterol, total 240 (H) 02/29/2016 04:43 AM    HDL Cholesterol 81 (H) 02/29/2016 04:43 AM    LDL, calculated 141.4 (H) 02/29/2016 04:43 AM    VLDL, calculated 17.6 02/29/2016 04:43 AM    Triglyceride 88 02/29/2016 04:43 AM    CHOL/HDL Ratio 3.0 02/29/2016 04:43 AM     Lab Results   Component Value Date/Time    Sodium 136 11/28/2018 01:15 PM    Potassium 5.0 11/28/2018 01:15 PM    Chloride 100 11/28/2018 01:15 PM    CO2 31 11/28/2018 01:15 PM    Anion gap 5 11/28/2018 01:15 PM    Glucose 105 (H) 11/28/2018 01:15 PM    BUN 20 (H) 11/28/2018 01:15 PM    Creatinine 1.24 11/28/2018 01:15 PM    BUN/Creatinine ratio 16 11/28/2018 01:15 PM    GFR est AA >60 11/28/2018 01:15 PM    GFR est non-AA 58 (L) 11/28/2018 01:15 PM    Calcium 9.0 11/28/2018 01:15 PM    Bilirubin, total 0.7 11/28/2018 01:15 PM    AST (SGOT) 22 11/28/2018 01:15 PM    Alk. phosphatase 65 11/28/2018 01:15 PM    Protein, total 8.0 11/28/2018 01:15 PM    Albumin 3.7 11/28/2018 01:15 PM    Globulin 4.3 (H) 11/28/2018 01:15 PM    A-G Ratio 0.9 11/28/2018 01:15 PM    ALT (SGPT) 28 11/28/2018 01:15 PM     Lab Results   Component Value Date/Time    TSH 1.010 10/18/2012 01:30 PM    TSH 1.15 04/21/2010 08:36 PM       Monitor:  Conclusion:  1. The rhythm was Sinus. 2. NH and QRS are within normal limits. 3. (42) single SVEs, (8) paired, (1) run of Supraventricular Tachycardia (SVT)  4. (3800) single VEs, (2) paired, trigeminy, bigeminy noted. 5. Patient diary was submitted symptoms noted, no patient triggered events noted    Impression and Plan:  Carlos Alberto Matthews is a 70 y.o. with:    1.) Atypical CP  2.) +FH premature CAD  3.) PVCs and PACs  4.) Asthma, known  5.) Type 1 diastolic dysfunction by prior echo 2016    1.) Stress echocardiogram  2.) If negative, ectopy is benign and wouldn't treat palpitations unless really bothering him  3.) Encouraged continued exercise program  4.) If still considered about future ASCVD risk, did suggest heart scan in future for CAC score to reclassify potential future risk (can help support primary prevention efforts of ASA 81 + statin therapy). All questions answered. Will plan to call with results, if negative- happy to see him again at your discretion.     Thank you for allowing me to participate in the care of your patient, please do not hesitate to call with questions or concerns.     Kindest Regards,    Astrid Day, DO

## 2019-06-27 NOTE — PROGRESS NOTES
Ann Sommer presents today for   Chief Complaint   Patient presents with    Rapid Heart Rate     NEW PATIENT     Palpitations     racing        Ann Sommer preferred language for health care discussion is english/other. Is someone accompanying this pt? yes    Is the patient using any DME equipment during [address]? no    Depression Screening:  3 most recent PHQ Screens 8/3/2018   Little interest or pleasure in doing things Not at all   Feeling down, depressed, irritable, or hopeless Not at all   Total Score PHQ 2 0   Trouble falling or staying asleep, or sleeping too much -   Feeling tired or having little energy -   Poor appetite, weight loss, or overeating -   Feeling bad about yourself - or that you are a failure or have let yourself or your family down -   Trouble concentrating on things such as school, work, reading, or watching TV -   Moving or speaking so slowly that other people could have noticed; or the opposite being so fidgety that others notice -   Thoughts of being better off dead, or hurting yourself in some way -   PHQ 9 Score -   How difficult have these problems made it for you to do your work, take care of your home and get along with others -       Learning Assessment:  Learning Assessment 4/8/2015   PRIMARY LEARNER Patient   HIGHEST LEVEL OF EDUCATION - PRIMARY LEARNER  -   BARRIERS PRIMARY LEARNER -   CO-LEARNER CAREGIVER -   PRIMARY LANGUAGE ENGLISH   LEARNER PREFERENCE PRIMARY DEMONSTRATION     READING   ANSWERED BY patient   RELATIONSHIP SELF       Abuse Screening:  Abuse Screening Questionnaire 6/27/2019   Do you ever feel afraid of your partner? N   Are you in a relationship with someone who physically or mentally threatens you? N   Is it safe for you to go home? Y       Fall Risk  Fall Risk Assessment, last 12 mths 6/27/2019   Able to walk? Yes   Fall in past 12 months? No       Pt currently taking Anticoagulant therapy? no    Coordination of Care:  1.  Have you been to the ER, urgent care clinic since your last visit? Hospitalized since your last visit? no    2. Have you seen or consulted any other health care providers outside of the [address] [place] [address] since your last visit? Include any pap smears or colon screening.  no

## 2019-06-27 NOTE — PATIENT INSTRUCTIONS
A  will call you to schedule your cardiac testing. If you do not receive a phone call within 48 hours or miss it you may call; Central Scheduling [phone] We will call you with your results. Follow up in the future as needed.

## 2019-07-10 ENCOUNTER — HOSPITAL ENCOUNTER (OUTPATIENT)
Dept: INFUSION THERAPY | Age: 69
Discharge: HOME | End: 2019-07-10
Payer: MEDICARE

## 2019-07-10 VITALS
TEMPERATURE: 98.2 F | DIASTOLIC BLOOD PRESSURE: 83 MMHG | OXYGEN SATURATION: 98 % | HEART RATE: 94 BPM | RESPIRATION RATE: 18 BRPM | SYSTOLIC BLOOD PRESSURE: 141 MMHG

## 2019-07-10 PROCEDURE — 96372 THER/PROPH/DIAG INJ SC/IM: CPT

## 2019-07-10 PROCEDURE — 74011250636 HC RX REV CODE- 250/636: Performed by: INTERNAL MEDICINE

## 2019-07-10 RX ADMIN — OMALIZUMAB 150 MG: 150 INJECTION, SOLUTION SUBCUTANEOUS at 10:34

## 2019-07-10 RX ADMIN — OMALIZUMAB 150 MG: 150 INJECTION, SOLUTION SUBCUTANEOUS at 10:32

## 2019-07-10 NOTE — PROGRESS NOTES
[facility] Short Consult Note                  (Labs/Type & Cross/Portflush/Antibiotic/Non-Chemo injections)      Date: July 10, 2019    Name: Deejay Hoffman    MRN: [id number]         : 1950    Treatment: [address] Injection      Mr. Daniel Segura was assessed and education was provided. Denies any reaction from last injection    Mr. Chelsea Fisher vitals were reviewed and patient was observed for 5 minutes prior to treatment. Visit Vitals  /83 (BP 1 Location: Right arm, BP Patient Position: Sitting)   Pulse 94   Temp 98.2 °F (36.8 °C)   Resp 18   SpO2 98%       Xolair 300 mg SQ given in two equally divided injections:      Xolair 150 mg SQ given in patient's right upper arm and band aid applied.      Xolair 150 mg SQ given in patient's left upper arm and band aid applied to site.      Mr. Rey tolerated injections, and had no complaints at this time. Mr. Daniel Segura was discharged from [place] [address] in stable condition at 1040  He is to return on 2019 at 1100 for his next appointment for [address] Armband removed and shredded .     Jessica Killian RN  July 10, 2019

## 2019-07-19 ENCOUNTER — HOSPITAL ENCOUNTER (OUTPATIENT)
Dept: NON INVASIVE DIAGNOSTICS | Age: 69
Discharge: HOME | End: 2019-07-19
Attending: INTERNAL MEDICINE
Payer: MEDICARE

## 2019-07-19 VITALS
HEIGHT: 71 IN | WEIGHT: 171 LBS | DIASTOLIC BLOOD PRESSURE: 84 MMHG | SYSTOLIC BLOOD PRESSURE: 122 MMHG | BODY MASS INDEX: 23.94 KG/M2

## 2019-07-19 DIAGNOSIS — R07.9 CHEST PAIN, UNSPECIFIED TYPE: ICD-10-CM

## 2019-07-19 LAB
STRESS ANGINA INDEX: 0
STRESS BASELINE DIAS BP: 84 MMHG
STRESS BASELINE HR: 111 BPM
STRESS BASELINE SYS BP: 122 MMHG
STRESS ESTIMATED WORKLOAD: 13.4 METS
STRESS EXERCISE DUR MIN: NORMAL
STRESS PEAK DIAS BP: 70 MMHG
STRESS PEAK SYS BP: 180 MMHG
STRESS PERCENT HR ACHIEVED: 112 %
STRESS POST PEAK HR: 169 BPM
STRESS RATE PRESSURE PRODUCT: NORMAL BPM*MMHG
STRESS SR DUKE TREADMILL SCORE: 0
STRESS ST DEPRESSION: 0 MM
STRESS ST ELEVATION: 0 MM
STRESS STAGE 1 BP: NORMAL MMHG
STRESS STAGE 1 DURATION: 3 MIN:SEC
STRESS STAGE 1 HR: 127 BPM
STRESS STAGE 2 BP: NORMAL MMHG
STRESS STAGE 2 DURATION: 3 MIN:SEC
STRESS STAGE 2 HR: 137 BPM
STRESS STAGE 3 BP: NORMAL MMHG
STRESS STAGE 3 DURATION: 3 MIN:SEC
STRESS STAGE 3 HR: 150 BPM
STRESS STAGE 4 BP: NORMAL MMHG
STRESS STAGE 4 DURATION: NORMAL MIN:SEC
STRESS STAGE 4 HR: 169 BPM
STRESS STAGE RECOVERY 1 BP: NORMAL MMHG
STRESS STAGE RECOVERY 1 DURATION: 2 MIN:SEC
STRESS STAGE RECOVERY 1 HR: 131 BPM
STRESS STAGE RECOVERY 2 BP: NORMAL MMHG
STRESS STAGE RECOVERY 2 DURATION: 4 MIN:SEC
STRESS STAGE RECOVERY 2 HR: 129 BPM
STRESS TARGET HR: 151 BPM

## 2019-07-19 PROCEDURE — 93351 STRESS TTE COMPLETE: CPT

## 2019-07-24 ENCOUNTER — HOSPITAL ENCOUNTER (OUTPATIENT)
Dept: INFUSION THERAPY | Age: 69
Discharge: HOME | End: 2019-07-24
Payer: MEDICARE

## 2019-07-24 VITALS
TEMPERATURE: 98.7 F | SYSTOLIC BLOOD PRESSURE: 146 MMHG | OXYGEN SATURATION: 97 % | DIASTOLIC BLOOD PRESSURE: 77 MMHG | HEART RATE: 94 BPM | RESPIRATION RATE: 18 BRPM

## 2019-07-24 PROCEDURE — 96372 THER/PROPH/DIAG INJ SC/IM: CPT

## 2019-07-24 PROCEDURE — 74011250636 HC RX REV CODE- 250/636: Performed by: INTERNAL MEDICINE

## 2019-07-24 RX ADMIN — OMALIZUMAB 150 MG: 150 INJECTION, SOLUTION SUBCUTANEOUS at 10:06

## 2019-07-24 RX ADMIN — OMALIZUMAB 150 MG: 150 INJECTION, SOLUTION SUBCUTANEOUS at 10:04

## 2019-07-24 NOTE — PROGRESS NOTES
[facility] Short Consult Note                  (Labs/Type & Cross/Portflush/Antibiotic/Non-Chemo injections)      Date: 2019    Name: Eloina Maurer    MRN: [id number]         : 1950    Treatment: [address] Injection      Mr. APRIL PERRY was assessed and education was provided. Denies any reaction from last injection    Mr. Herman Garcia vitals were reviewed and patient was observed for 5 minutes prior to treatment. Visit Vitals  /77 (BP 1 Location: Right arm, BP Patient Position: Sitting)   Pulse 94   Temp 98.7 °F (37.1 °C)   Resp 18   SpO2 97%       Xolair 300 mg SQ given in two equally divided injections:      Xolair 150 mg SQ given in patient's right upper arm and band aid applied.      Xolair 150 mg SQ given in patient's left upper arm and band aid applied to site.      Mr. Rey tolerated injections, and had no complaints at this time. Mr. APRIL PERRY was discharged from [place] [address] in stable condition at 1015  He is to return on 2019 at 1100 for his next appointment for [address]. Armband removed and shredded .     Meena Ramos RN  2019

## 2019-07-26 NOTE — PROGRESS NOTES
Per your last office note'  Impression and Plan:  Jenna Summers is a 70 y.o. with:     1.) Atypical CP  2.) +FH premature CAD  3.) PVCs and PACs  4.) Asthma, known  5.) Type 1 diastolic dysfunction by prior echo 2016     1.) Stress echocardiogram  2.) If negative, ectopy is benign and wouldn't treat palpitations unless really bothering him  3.) Encouraged continued exercise program  4.) If still considered about future ASCVD risk, did suggest heart scan in future for CAC score to reclassify potential future risk (can help support primary prevention efforts of ASA 81 + statin therapy). All questions answered. Will plan to call with results, if negative- happy to see him again at your discretion.     Thank you for allowing me to participate in the care of your patient, please do not hesitate to call with questions or concerns.

## 2019-07-29 ENCOUNTER — TELEPHONE (OUTPATIENT)
Dept: CARDIOLOGY CLINIC | Age: 69
End: 2019-07-29

## 2019-07-29 NOTE — TELEPHONE ENCOUNTER
----- Message from Idalia Pope DO sent at 7/26/2019  2:09 PM EDT -----  Please let him know stress test was negative/ low risk.    ----- Message -----  From: Gagan Rangel LPN  Sent: 2/99/2493   8:24 AM EDT  To: Idalia Pope DO    Per your last office note'  Impression and Plan:  Zelda Mead is a 70 y.o. with:     1.) Atypical CP  2.) +FH premature CAD  3.) PVCs and PACs  4.) Asthma, known  5.) Type 1 diastolic dysfunction by prior echo 2016     1.) Stress echocardiogram  2.) If negative, ectopy is benign and wouldn't treat palpitations unless really bothering him  3.) Encouraged continued exercise program  4.) If still considered about future ASCVD risk, did suggest heart scan in future for CAC score to reclassify potential future risk (can help support primary prevention efforts of ASA 81 + statin therapy). All questions answered. Will plan to call with results, if negative- happy to see him again at your discretion.     Thank you for allowing me to participate in the care of your patient, please do not hesitate to call with questions or concerns.

## 2019-08-07 ENCOUNTER — HOSPITAL ENCOUNTER (OUTPATIENT)
Dept: INFUSION THERAPY | Age: 69
Discharge: HOME | End: 2019-08-07
Payer: MEDICARE

## 2019-08-07 VITALS
RESPIRATION RATE: 18 BRPM | OXYGEN SATURATION: 95 % | TEMPERATURE: 98.1 F | SYSTOLIC BLOOD PRESSURE: 132 MMHG | DIASTOLIC BLOOD PRESSURE: 84 MMHG

## 2019-08-07 PROCEDURE — 74011250636 HC RX REV CODE- 250/636: Performed by: INTERNAL MEDICINE

## 2019-08-07 PROCEDURE — 96372 THER/PROPH/DIAG INJ SC/IM: CPT

## 2019-08-07 RX ADMIN — OMALIZUMAB 150 MG: 150 INJECTION, SOLUTION SUBCUTANEOUS at 10:30

## 2019-08-07 RX ADMIN — OMALIZUMAB 150 MG: 150 INJECTION, SOLUTION SUBCUTANEOUS at 10:31

## 2019-08-07 NOTE — PROGRESS NOTES
[facility] Short Consult Note                  (Labs/Type & Cross/Portflush/Antibiotic/Non-Chemo injections)      Date: 2019    Name: Nelli Valenzuela    MRN: [id number]         : 1950    Treatment: [address] Injection      Mr. Joselyn Hernandez was assessed and education was provided. Denies any reaction from last injection    Mr. Rosas Gayle vitals were reviewed and patient was observed for 5 minutes prior to treatment. Visit Vitals  /84 (BP 1 Location: Right arm, BP Patient Position: Sitting)   Temp 98.1 °F (36.7 °C)   Resp 18   SpO2 95%       Xolair 300 mg SQ given in two equally divided injections:      Xolair 150 mg SQ given in patient's right upper arm and band aid applied.      Xolair 150 mg SQ given in patient's left upper arm and band aid applied to site.      Mr. Rey tolerated injections, and had no complaints at this time. Mr. Joselyn Hernandez was discharged from [place] [address] in stable condition at 1035 He is to return on 2019 at 1000 for his next appointment for [address] Armband removed and shredded .     Brian Dougherty RN  2019

## 2019-08-21 ENCOUNTER — HOSPITAL ENCOUNTER (OUTPATIENT)
Dept: INFUSION THERAPY | Age: 69
Discharge: HOME | End: 2019-08-21
Payer: MEDICARE

## 2019-08-21 VITALS
RESPIRATION RATE: 18 BRPM | DIASTOLIC BLOOD PRESSURE: 75 MMHG | TEMPERATURE: 98.8 F | OXYGEN SATURATION: 98 % | SYSTOLIC BLOOD PRESSURE: 122 MMHG

## 2019-08-21 PROCEDURE — 96372 THER/PROPH/DIAG INJ SC/IM: CPT

## 2019-08-21 PROCEDURE — 74011250636 HC RX REV CODE- 250/636: Performed by: INTERNAL MEDICINE

## 2019-08-21 RX ADMIN — OMALIZUMAB 150 MG: 150 INJECTION, SOLUTION SUBCUTANEOUS at 10:24

## 2019-08-21 NOTE — PROGRESS NOTES
[address] [facility] Progress Note    Date: 2019    Name: Wilfrido Dexter    MRN: [id number]         : 1950      Mr. Leon Zepeda arrived to [facility] at [phone]. Mr. Loen Zepeda was assessed and education was provided. Pt denies complaints. Mr. Suman Mason vitals were reviewed. Visit Vitals  /75 (BP 1 Location: Right arm, BP Patient Position: Sitting)   Temp 98.8 °F (37.1 °C)   Resp 18   SpO2 98%       Xolair 300mg was administered as ordered SQ in patient's right and left upper arms (given in 2 divided doses of 150mg each). Mr. Leon Zepeda tolerated well without complaints. Mr. Leon Zepeda was discharged from [place] [address] in stable condition at 1030. He is to return on 19 at 1030 for his next appointment.     Clau Shields RN  2019

## 2019-09-04 ENCOUNTER — HOSPITAL ENCOUNTER (OUTPATIENT)
Dept: INFUSION THERAPY | Age: 69
Discharge: HOME | End: 2019-09-04
Payer: MEDICARE

## 2019-09-04 VITALS
SYSTOLIC BLOOD PRESSURE: 133 MMHG | OXYGEN SATURATION: 99 % | HEART RATE: 94 BPM | TEMPERATURE: 97.6 F | DIASTOLIC BLOOD PRESSURE: 84 MMHG | RESPIRATION RATE: 18 BRPM

## 2019-09-04 PROCEDURE — 74011250636 HC RX REV CODE- 250/636: Performed by: INTERNAL MEDICINE

## 2019-09-04 PROCEDURE — 96372 THER/PROPH/DIAG INJ SC/IM: CPT

## 2019-09-04 RX ADMIN — OMALIZUMAB 150 MG: 150 INJECTION, SOLUTION SUBCUTANEOUS at 10:38

## 2019-09-04 NOTE — PROGRESS NOTES
[facility] Progress Note    Date: 2019    Name: Devang Conde    MRN: [id number]         : 1950      Mr. Joan Singh arrived to [facility] at 21 . Mr. Joan Singh was assessed and education was provided. Pt denies complaints. Mr. Js Summers vitals were reviewed. Visit Vitals  /84 (BP 1 Location: Left arm, BP Patient Position: Sitting)   Temp 97.6 °F (36.4 °C)   Resp 18   SpO2 99%       Xolair 300mg was administered as ordered SQ in patient's right and left upper arms (given in 2 divided doses of 150mg each). Mr. Joan Singh tolerated well without complaints. Mr. Joan Singh was discharged from [place] [address] in stable condition at 1050. He is to return on 19 at 21  for his next appointment.     Jazmyn Montana RN  2019

## 2019-09-18 ENCOUNTER — HOSPITAL ENCOUNTER (OUTPATIENT)
Dept: INFUSION THERAPY | Age: 69
Discharge: HOME | End: 2019-09-18
Payer: MEDICARE

## 2019-09-18 VITALS
SYSTOLIC BLOOD PRESSURE: 128 MMHG | TEMPERATURE: 98.3 F | OXYGEN SATURATION: 98 % | RESPIRATION RATE: 18 BRPM | HEART RATE: 93 BPM | DIASTOLIC BLOOD PRESSURE: 82 MMHG

## 2019-09-18 PROCEDURE — 96372 THER/PROPH/DIAG INJ SC/IM: CPT

## 2019-09-18 PROCEDURE — 74011250636 HC RX REV CODE- 250/636: Performed by: INTERNAL MEDICINE

## 2019-09-18 RX ADMIN — OMALIZUMAB 150 MG: 150 INJECTION, SOLUTION SUBCUTANEOUS at 10:38

## 2019-09-18 RX ADMIN — OMALIZUMAB 150 MG: 150 INJECTION, SOLUTION SUBCUTANEOUS at 10:40

## 2019-09-18 NOTE — PROGRESS NOTES
[facility] Progress Note    Date: 2019    Name: Levi Jones    MRN: [id number]         : 1950      Mr. Lesly Paige arrived to [facility] at 21 [phone]. Mr. Lesly Paige was assessed and education was provided. Pt denies complaints. Mr. Elie Post vitals were reviewed. Visit Vitals  /82 (BP 1 Location: Left arm, BP Patient Position: Sitting)   Pulse 93   Temp 98.3 °F (36.8 °C)   Resp 18   SpO2 98%       Xolair 300mg was administered as ordered SQ in patient's right and left upper arms (given in 2 divided doses of 150mg each). Mr. Lesly Paige tolerated well without complaints. Mr. Lesly Paige was discharged from [place] [address] in stable condition at 1040. He is to return on 10/2/19 at 1030 for his next appointment.     Kristi Rogers RN  2019

## 2019-10-02 ENCOUNTER — HOSPITAL ENCOUNTER (OUTPATIENT)
Dept: INFUSION THERAPY | Age: 69
Discharge: HOME | End: 2019-10-02
Payer: MEDICARE

## 2019-10-02 VITALS
HEART RATE: 96 BPM | TEMPERATURE: 97.8 F | SYSTOLIC BLOOD PRESSURE: 126 MMHG | OXYGEN SATURATION: 98 % | DIASTOLIC BLOOD PRESSURE: 83 MMHG | RESPIRATION RATE: 18 BRPM

## 2019-10-02 PROCEDURE — 96372 THER/PROPH/DIAG INJ SC/IM: CPT

## 2019-10-02 PROCEDURE — 74011250636 HC RX REV CODE- 250/636: Performed by: INTERNAL MEDICINE

## 2019-10-02 RX ADMIN — OMALIZUMAB 150 MG: 150 INJECTION, SOLUTION SUBCUTANEOUS at 10:40

## 2019-10-02 RX ADMIN — OMALIZUMAB 150 MG: 150 INJECTION, SOLUTION SUBCUTANEOUS at 10:38

## 2019-10-02 NOTE — PROGRESS NOTES
[facility] Short Consult Note                  (Labs/Type & Cross/Portflush/Antibiotic/Non-Chemo injections)      Date: 2019    Name: Kenneth Izaguirre    MRN: [id number]         : 1950    Treatment: [address] Injection      Mr. Thuan Garcia was assessed and education was provided. Denies any reaction from last injection. States he \" currently has a cold, sore throat and stuffy nose, but will survive. \"      Mr. Leonela Almodovar vitals were reviewed and patient was observed for 5 minutes prior to treatment. Visit Vitals  /83 (BP 1 Location: Left arm, BP Patient Position: Sitting)   Pulse 96   Temp 97.8 °F (36.6 °C)   Resp 18   SpO2 98%       Xolair 300 mg SQ given in two equally divided injections:      Xolair 150 mg SQ given in patient's right upper arm and band aid applied.      Xolair 150 mg SQ given in patient's left upper arm and band aid applied to site.      Mr. Rey tolerated injections, and had no complaints at this time. Mr. Thuan Garcia was discharged from [facility] in stable condition at 1045 He is to return on 10/16/2019 at 56 for his next appointment for [address] Armband removed and shredded .     Courtney Li RN  2019

## 2019-10-03 ENCOUNTER — OFFICE VISIT (OUTPATIENT)
Dept: FAMILY MEDICINE CLINIC | Age: 69
End: 2019-10-03

## 2019-10-03 VITALS
HEART RATE: 68 BPM | RESPIRATION RATE: 14 BRPM | WEIGHT: 167.6 LBS | BODY MASS INDEX: 23.46 KG/M2 | TEMPERATURE: 99.1 F | DIASTOLIC BLOOD PRESSURE: 70 MMHG | HEIGHT: 71 IN | OXYGEN SATURATION: 98 % | SYSTOLIC BLOOD PRESSURE: 124 MMHG

## 2019-10-03 DIAGNOSIS — B34.9 VIRAL SYNDROME: Primary | ICD-10-CM

## 2019-10-03 NOTE — PROGRESS NOTES
Patient c/o cold sx. C/o diarrhea, chills, fever, sweats, runny nose, itchy eyes and productive cough x 4 days. He denies any nausea or vomiting. 1. Have you been to the ER, urgent care clinic since your last visit? Hospitalized since your last visit? No  2. Have you seen or consulted any other health care providers outside of the [address] [place] [address] since your last visit? Include any pap smears or colon screening. No    Medication reconciliation has been completed with patient. Care team discussed/updated as well as pharmacy. Health Maintenance Due   Topic Date Due    Shingrix Vaccine Age 49> (1 of 2) 08/02/1999    GLAUCOMA SCREENING Q2Y  12/01/2015    Influenza Age 5 to Adult  08/01/2019     Health Maintenance reviewed - Will discuss flu vaccine with physician.

## 2019-10-03 NOTE — PATIENT INSTRUCTIONS
You may take acetaminophen (Tylenol) 1000 mg (2 extra strength tabs) up to 3x each day for pain. You may take ibuprofen 800 mg up to 3x each day for pain. These may be safely combined    Try over the counter phenylephrine (Sudafed PE), but if you find that you are still struggling with a runny nose and post nasal drainage, ask the staff for the \"good old fashioned Sudafed\" (pseudoephedrine) which is kept behind the counter. Look for Guaifenesin as an active ingredient to help loosen thick secretions. You may see the word \"expectorant\". Sinus congestion and pressure regimen:   1) Nasal decongestant, such as oxymetazoline (Afrin),   2) followed by saline irrigation (Neti Pot), using distilled water  3) followed by a nasal steroid, such as fluticasone (Flonase) or mometasone (Nasonex). Limit nasal decongestants to no more than 3 days to avoid rebound congestion. High dose vitamin C containing products such as Emergen-C or Airborne may help shorten the duration of viral illnesses.

## 2019-10-03 NOTE — PROGRESS NOTES
Shawn Nevarez is a 78 y.o. male who was seen in clinic today (10/3/2019). Assessment & Plan:       ICD-10-CM ICD-9-CM    1. Viral syndrome B34.9 079.99        Patient Instructions   You may take acetaminophen (Tylenol) 1000 mg (2 extra strength tabs) up to 3x each day for pain. You may take ibuprofen 800 mg up to 3x each day for pain. These may be safely combined    Try over the counter phenylephrine (Sudafed PE), but if you find that you are still struggling with a runny nose and post nasal drainage, ask the staff for the \"good old fashioned Sudafed\" (pseudoephedrine) which is kept behind the counter. Look for Guaifenesin as an active ingredient to help loosen thick secretions. You may see the word \"expectorant\". Sinus congestion and pressure regimen:   1) Nasal decongestant, such as oxymetazoline (Afrin),   2) followed by saline irrigation (Neti Pot), using distilled water  3) followed by a nasal steroid, such as fluticasone (Flonase) or mometasone (Nasonex). Limit nasal decongestants to no more than 3 days to avoid rebound congestion. High dose vitamin C containing products such as Emergen-C or Airborne may help shorten the duration of viral illnesses. Influenza vaccination was recommended in accordance with CDC guidelines. He declined. I encouraged him to reconsider and return should he change his mind. Follow-up and Dispositions    · Return if symptoms worsen or fail to improve.          Subjective:   Shawn Nevarez was seen today for Cold Symptoms    complains of diarrhea, chills, fever, sweats, runny nose, itchy eyes and productive cough x 4 days, significantly improved with only residual head congestion    Denies wheezing  Denies nausea/vomiting    Neb last week with weather change - no CHERELLE at all this week         Outpatient Medications Marked as Taking for the 10/3/19 encounter (Office Visit) with Kathi Aaron MD   Medication Sig Dispense Refill    montelukast (SINGULAIR) 10 mg tablet Take 1 Tab by mouth daily. 90 Tab 4    fluticasone-salmeterol (ADVAIR DISKUS) 100-50 mcg/dose diskus inhaler Take 1 Puff by inhalation every twelve (12) hours.  omalizumab (XOLAIR) 150 mg solr by SubCUTAneous route every fourteen (14) days.  cetirizine-psuedoePHEDrine (ZYRTEC-D) 5-120 mg per tablet Take 1 Tab by mouth daily. 90 Tab 4    Omeprazole delayed release (PRILOSEC D/R) 20 mg tablet Take 1 Tab by mouth daily. (Patient taking differently: Take 20 mg by mouth as needed.) 90 Tab 4    levalbuterol tartrate (XOPENEX) 45 mcg/actuation inhaler Take 2 Puffs by inhalation every four (4) hours as needed for Wheezing. 1 Inhaler 4    levalbuterol (XOPENEX) 1.25 mg/3 mL nebu 3 mL by Nebulization route every six (6) hours as needed. 1 Package 2    aspirin 81 mg chewable tablet Take 1 Tab by mouth daily. 90 Tab 4    FA/MV-MN/MIN AA JANETT/SAW PAL (SAW PALMETO-MULTIVIT-MIN-AA-FA PO) Take 1 Tab by mouth daily.  Cholecalciferol, Vitamin D3, (VITAMIN D) 2,000 unit Cap Take 1 Cap by mouth daily. 30 Cap 11       Patient Active Problem List    Diagnosis    Gastroesophageal reflux disease without esophagitis    Depression    Cubital tunnel syndrome on right    Lumbar spondylosis     [facility] 10/19/2018 lumbar spondylosis with disc bulging; posterior element hypertrophy contributing to neural foraminal narrowing at multiple levels; no herniations      Other specified abnormal immunological findings in serum     Pos SSA, BRENDA annual surveillance without tx An Galvez MD      Hyperlipidemia     3/6/2016: ASCVD 10 year risk 14.9 %. Lifetime risk NA . Statin initiated.  Diastolic dysfunction     9/66/5735: inpatient echo (chest pain) EF 37-63% diastolic dysfunction I. Dr. Gardenia Do. CCB initiated 3/6/2016. Per Dr. German Xie (pulm) re comorbid asthma/COPD 9/8/2017, avoid ACE I. Favor BB and ARB.          Essential hypertension     3/6/2016: ASCVD 10 year risk 14.9 %. Lifetime risk NA. Aspirin initiated. Switched from thiazide to CCB for context of diastolic dysfunction.  Elevated PSA    BPH with obstruction/lower urinary tract symptoms    Family history of prostate cancer    Lumbosacral radiculopathy due to degenerative joint disease of spine    Cervical radiculopathy due to degenerative joint disease of spine     MRI 10/17/2018 through [address]: Multilevel DJD and uncovertebral arthritis. 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 Severe central canal stenosis at C6-7 and C5-6 with moderate narrowing at C4-5 and mild narrowing at C3-4. Bilateral neuroforaminal narrowing at C5-6 and C6-7.  Allergic rhinitis    Severe persistent asthma without complication     8/9/6831: PFT consistent with Obstructive airway disease mild in nature. 9/13/2018: Dawood Ambrosio MD: FEV1 of 2.25 or 67% predicted March 2018 PFT-IgE level of 324-on Xolair  -Positive SSA, BRENDA, RF, surveillance without tx by rheumatology      Vitamin D deficiency         Allergies   Allergen Reactions    Latex Hives and Itching    Cashew Nut Other (comments)     \"UPSET STOMACH\"    Milk Diarrhea    Other Medication Other (comments)     Pt allergic to cats with respiratory, skin reactions. Pt allergic to cashews with upset stomach    Vicodin [Hydrocodone-Acetaminophen] Other (comments)     hallucinations         Patient Care Team:  Pili Guillaume MD as PCP - General (Family Practice)  Pili Guillaume MD (Family Practice)  Namrata Lawrence MD as Surgeon (Surgery)  Nahomi Oconnor MD (Rheumatology)  Sonny Castillo MD (Orthopedic Surgery)  Mc Perales MD (Pulmonary Disease)    The following sections were reviewed & updated as appropriate: PMH, PSH, FH, and SH.       ROS - per HPI       Objective:     Visit Vitals  /70   Pulse 68   Temp 99.1 °F (37.3 °C) (Oral)   Resp 14   Ht 5' 11\" (1.803 m)   Wt 167 lb 9.6 oz (76 kg)   SpO2 98%   BMI 23.38 kg/m²      Physical Exam   Constitutional: He is oriented to person, place, and time. He appears well-developed. No distress. Pleasant elderly black male   HENT:   Head: Normocephalic and atraumatic. Right Ear: Hearing, tympanic membrane, external ear and ear canal normal.   Left Ear: Hearing, tympanic membrane, external ear and ear canal normal.   Nose: Nose normal.   Mouth/Throat: Uvula is midline and oropharynx is clear and moist. No oropharyngeal exudate. Eyes: Conjunctivae are normal.   Neck: Neck supple. Cardiovascular: Normal rate, regular rhythm and normal heart sounds. No murmur heard. Pulmonary/Chest: Effort normal and breath sounds normal. No respiratory distress. He has no wheezes. He has no rales. Lymphadenopathy:     He has no cervical adenopathy. Neurological: He is alert and oriented to person, place, and time. Psychiatric: He has a normal mood and affect. His behavior is normal. Judgment and thought content normal.         Disclaimer: The patient understands our medical plan. Alternatives have been explained and offered. The risks, benefits and significant side effects of all medications have been reviewed. Anticipated time course and progression of condition reviewed. All questions have been addressed. He is encouraged to employ the information provided in the after visit summary, which was reviewed. Where applicable, he is instructed to call the clinic if he has not been notified either by phone or through [address] with the results of his tests or with an appointment plan for any referrals within 1 week(s). No news is not good news; it's no news. The patient  is to call if his condition worsens or fails to improve or if significant side effects are experienced. Aspects of this note may have been generated using voice recognition software. Despite editing, there may be unrecognized errors.        Berlin Mills MD

## 2019-10-14 ENCOUNTER — PATIENT MESSAGE (OUTPATIENT)
Dept: FAMILY MEDICINE CLINIC | Age: 69
End: 2019-10-14

## 2019-10-15 NOTE — TELEPHONE ENCOUNTER
Called Mr. Aretha Hendrix to fit him in on Friday at 9:30am. He said there was a new development since he messaged us yesterday. He said it turns out that the doctor called the family member and informed him that he doesn't have it. So Mr. Aretha Hendrix doesn't need this appointment now. Closing encounter.

## 2019-10-15 NOTE — TELEPHONE ENCOUNTER
From: Wilfrido Dexter  Sent: 10/14/2019 3:29 PM EDT  Subject: Appointment Request    Appointment Request From: Wilfrido Dexter    With Provider: Megan Camacho MD [facility]]    Preferred Date Range: Any    Preferred Times: Any time    Reason for visit: Request an Appointment    Comments:  Someone close to me has been diagnosed as having TB.

## 2019-10-16 ENCOUNTER — HOSPITAL ENCOUNTER (OUTPATIENT)
Dept: INFUSION THERAPY | Age: 69
Discharge: HOME | End: 2019-10-16
Payer: MEDICARE

## 2019-10-16 VITALS
SYSTOLIC BLOOD PRESSURE: 134 MMHG | RESPIRATION RATE: 16 BRPM | HEART RATE: 82 BPM | OXYGEN SATURATION: 98 % | TEMPERATURE: 98.5 F | DIASTOLIC BLOOD PRESSURE: 85 MMHG

## 2019-10-16 PROCEDURE — 74011250636 HC RX REV CODE- 250/636: Performed by: INTERNAL MEDICINE

## 2019-10-16 PROCEDURE — 96372 THER/PROPH/DIAG INJ SC/IM: CPT

## 2019-10-16 RX ADMIN — OMALIZUMAB 150 MG: 150 INJECTION, SOLUTION SUBCUTANEOUS at 10:38

## 2019-10-16 RX ADMIN — OMALIZUMAB 150 MG: 150 INJECTION, SOLUTION SUBCUTANEOUS at 10:39

## 2019-10-16 NOTE — PROGRESS NOTES
[facility] Short Consult Note                  (Labs/Type & Cross/Portflush/Antibiotic/Non-Chemo injections)      Date: 2019    Name: Tone Wakefield    MRN: [id number]         : 1950    Treatment: [address] Injection      Mr. Margie Montemayor was assessed and education was provided. Denies any reaction from last injection. Mr. Rickey Grimes vitals were reviewed and patient was observed for 5 minutes prior to treatment. Visit Vitals  /85 (BP 1 Location: Left arm, BP Patient Position: Sitting)   Pulse 82   Temp 98.5 °F (36.9 °C)   Resp 16   SpO2 98%       Xolair 300 mg SQ given in two equally divided injections:      Xolair 150 mg SQ given in patient's right upper arm and band aid applied.      Xolair 150 mg SQ given in patient's left upper arm and band aid applied to site.      Mr. Rey tolerated injections, and had no complaints at this time. Mr. Margie Montemayor was discharged from [place] [address] in stable condition at 1045 He is to return on 10/30/2019 at 56 for his next appointment for [address] Armband removed and shredded .     Dilia Salgado RN  2019

## 2019-10-30 ENCOUNTER — HOSPITAL ENCOUNTER (OUTPATIENT)
Dept: INFUSION THERAPY | Age: 69
Discharge: HOME | End: 2019-10-30
Payer: MEDICARE

## 2019-10-30 VITALS
DIASTOLIC BLOOD PRESSURE: 85 MMHG | SYSTOLIC BLOOD PRESSURE: 130 MMHG | OXYGEN SATURATION: 98 % | HEART RATE: 98 BPM | RESPIRATION RATE: 16 BRPM | TEMPERATURE: 98.8 F

## 2019-10-30 PROCEDURE — 96372 THER/PROPH/DIAG INJ SC/IM: CPT

## 2019-10-30 PROCEDURE — 74011250636 HC RX REV CODE- 250/636: Performed by: INTERNAL MEDICINE

## 2019-10-30 RX ADMIN — OMALIZUMAB 150 MG: 150 INJECTION, SOLUTION SUBCUTANEOUS at 10:40

## 2019-10-30 RX ADMIN — OMALIZUMAB 150 MG: 150 INJECTION, SOLUTION SUBCUTANEOUS at 10:38

## 2019-10-30 NOTE — PROGRESS NOTES
[facility] Short Consult Note                  (Labs/Type & Cross/Portflush/Antibiotic/Non-Chemo injections)      Date: 2019    Name: Oz Servin    MRN: [id number]         : 1950    Treatment: [address] Injection      Mr. Nallely Bruno was assessed and education was provided. Denies any reaction from last injection. Mr. Eri Saeed vitals were reviewed and patient was observed for 5 minutes prior to treatment. Visit Vitals  /85 (BP 1 Location: Left arm, BP Patient Position: Sitting)   Pulse 98   Temp 98.8 °F (37.1 °C)   Resp 16   SpO2 98%       Xolair 300 mg SQ given in two equally divided injections:      Xolair 150 mg SQ given in patient's right upper arm and band aid applied.      Xolair 150 mg SQ given in patient's left upper arm and band aid applied to site.      Mr. Rey tolerated injections, and had no complaints at this time. Mr. Nallely Bruno was discharged from [place] [address] in stable condition at 1045 He is to return on 2019 at 56 for his next appointment for [address] Armband removed and shredded .     Alicia Byrne RN  2019

## 2019-11-13 ENCOUNTER — HOSPITAL ENCOUNTER (OUTPATIENT)
Dept: INFUSION THERAPY | Age: 69
Discharge: HOME | End: 2019-11-13
Payer: MEDICARE

## 2019-11-13 VITALS
DIASTOLIC BLOOD PRESSURE: 86 MMHG | OXYGEN SATURATION: 96 % | SYSTOLIC BLOOD PRESSURE: 134 MMHG | RESPIRATION RATE: 16 BRPM | TEMPERATURE: 97 F | HEART RATE: 95 BPM

## 2019-11-13 DIAGNOSIS — J45.50 SEVERE PERSISTENT ASTHMA WITHOUT COMPLICATION: ICD-10-CM

## 2019-11-13 DIAGNOSIS — J45.50 SEVERE PERSISTENT ASTHMA WITHOUT COMPLICATION: Primary | ICD-10-CM

## 2019-11-13 PROCEDURE — 74011250636 HC RX REV CODE- 250/636: Performed by: INTERNAL MEDICINE

## 2019-11-13 PROCEDURE — 96372 THER/PROPH/DIAG INJ SC/IM: CPT

## 2019-11-13 RX ADMIN — OMALIZUMAB 150 MG: 150 INJECTION, SOLUTION SUBCUTANEOUS at 10:47

## 2019-11-13 NOTE — PROGRESS NOTES
[facility] Progress Note    Date: 2019    Name: Timothy Cotton    MRN: [id number]         : 1950    Xolair Injection    Mr. Kulkarni Monday arrived to [facility] at [id number]. Mr. Kulkarni Monday was assessed and education was provided. Pt denies complaints. Mr. Brian Villatoro vitals were reviewed. Visit Vitals  /86 (BP 1 Location: Left arm)   Pulse 95   Temp 97 °F (36.1 °C)   Resp 16   SpO2 96%       Xolair 300mg was administered as ordered SQ in patient's right and left upper arms (given in 2 divided doses of 150mg each). Mr. Kulkarni Monday tolerated well without complaints. Mr. Kulkarni Monday was discharged from [place] [address] in stable condition at 1055. He is to return on 19 at 1030 for his next appointment for Xolair.     Adriana David RN  2019

## 2019-11-27 ENCOUNTER — HOSPITAL ENCOUNTER (OUTPATIENT)
Dept: INFUSION THERAPY | Age: 69
Discharge: HOME | End: 2019-11-27
Payer: MEDICARE

## 2019-11-27 VITALS
OXYGEN SATURATION: 98 % | DIASTOLIC BLOOD PRESSURE: 92 MMHG | TEMPERATURE: 98 F | HEART RATE: 80 BPM | RESPIRATION RATE: 16 BRPM | SYSTOLIC BLOOD PRESSURE: 142 MMHG

## 2019-11-27 DIAGNOSIS — J45.50 SEVERE PERSISTENT ASTHMA WITHOUT COMPLICATION: ICD-10-CM

## 2019-11-27 DIAGNOSIS — J45.50 SEVERE PERSISTENT ASTHMA WITHOUT COMPLICATION: Primary | ICD-10-CM

## 2019-11-27 PROCEDURE — 74011250636 HC RX REV CODE- 250/636: Performed by: INTERNAL MEDICINE

## 2019-11-27 PROCEDURE — 96372 THER/PROPH/DIAG INJ SC/IM: CPT

## 2019-11-27 RX ADMIN — OMALIZUMAB 150 MG: 150 INJECTION, SOLUTION SUBCUTANEOUS at 10:41

## 2019-11-27 NOTE — PROGRESS NOTES
[facility] Progress Note    Date: 2019    Name: Ashlyn Eduardo    MRN: [id number]         : 1950    Xolair Injection    Mr. Bill Bansal arrived to [facility] at [id number]. Mr. Bill Bansal was assessed and education was provided. Pt denies complaints. Mr. Celena Bender vitals were reviewed. Visit Vitals  BP (!) 142/92   Pulse 80   Temp 98 °F (36.7 °C)   Resp 16   SpO2 98%       Xolair 300mg was administered as ordered SQ in patient's right and left upper arms (given in 2 divided doses of 150mg each). Mr. Bill Bansal tolerated well without complaints. Mr. Bill Bansal was discharged from [place] [address] in stable condition at 1050. He is to return on 19 at 56 for his next appointment for Xolair.     [facility], RN  2019

## 2019-12-11 ENCOUNTER — HOSPITAL ENCOUNTER (OUTPATIENT)
Dept: INFUSION THERAPY | Age: 69
Discharge: HOME | End: 2019-12-11
Payer: MEDICARE

## 2019-12-11 VITALS
RESPIRATION RATE: 16 BRPM | DIASTOLIC BLOOD PRESSURE: 89 MMHG | HEART RATE: 80 BPM | TEMPERATURE: 98.6 F | SYSTOLIC BLOOD PRESSURE: 149 MMHG | OXYGEN SATURATION: 98 %

## 2019-12-11 DIAGNOSIS — J45.50 SEVERE PERSISTENT ASTHMA WITHOUT COMPLICATION: ICD-10-CM

## 2019-12-11 DIAGNOSIS — J45.50 SEVERE PERSISTENT ASTHMA WITHOUT COMPLICATION: Primary | ICD-10-CM

## 2019-12-11 PROCEDURE — 96372 THER/PROPH/DIAG INJ SC/IM: CPT

## 2019-12-11 PROCEDURE — 74011250636 HC RX REV CODE- 250/636: Performed by: INTERNAL MEDICINE

## 2019-12-11 RX ADMIN — OMALIZUMAB 150 MG: 150 INJECTION, SOLUTION SUBCUTANEOUS at 10:37

## 2019-12-11 NOTE — PROGRESS NOTES
[facility] Progress Note    Date: 2019    Name: Rosa Zelaya    MRN: [id number]         : 1950    Xolair Injection    Mr. Aretha Hendrix arrived to [facility] at 56 in NAD. Mr. Aretha Hendrix was assessed and education was provided. Pt denies complaints. Mr. Cesilia Rees vitals were reviewed. Visit Vitals  /89   Pulse 80   Temp 98.6 °F (37 °C)   Resp 16   SpO2 98%     Xolair 300mg was administered as ordered SQ in patient's right and left upper arms (given in 2 divided doses of 150mg each). Mr. Aretha Hendrix tolerated well without complaints. Mr. Aretha Hendrix was discharged from [place] [address] in stable condition at 1040. He is to return on 19 at 1030 for his next appointment for Xolair.     Truman Padgett RN  2019

## 2019-12-25 DIAGNOSIS — J45.50 SEVERE PERSISTENT ASTHMA WITHOUT COMPLICATION: ICD-10-CM

## 2020-01-08 DIAGNOSIS — J45.50 SEVERE PERSISTENT ASTHMA WITHOUT COMPLICATION: ICD-10-CM

## 2020-01-22 ENCOUNTER — HOSPITAL ENCOUNTER (OUTPATIENT)
Dept: INFUSION THERAPY | Age: 70
Discharge: HOME | End: 2020-01-22
Payer: MEDICARE

## 2020-01-22 VITALS
HEART RATE: 86 BPM | SYSTOLIC BLOOD PRESSURE: 111 MMHG | TEMPERATURE: 97.1 F | DIASTOLIC BLOOD PRESSURE: 72 MMHG | OXYGEN SATURATION: 98 % | RESPIRATION RATE: 16 BRPM

## 2020-01-22 DIAGNOSIS — J45.50 SEVERE PERSISTENT ASTHMA WITHOUT COMPLICATION: ICD-10-CM

## 2020-01-22 DIAGNOSIS — J45.50 SEVERE PERSISTENT ASTHMA WITHOUT COMPLICATION: Primary | ICD-10-CM

## 2020-01-22 PROCEDURE — 96372 THER/PROPH/DIAG INJ SC/IM: CPT

## 2020-01-22 PROCEDURE — 74011250636 HC RX REV CODE- 250/636: Performed by: INTERNAL MEDICINE

## 2020-01-22 RX ORDER — CYCLOSPORINE 0.5 MG/ML
1 EMULSION OPHTHALMIC EVERY 12 HOURS
COMMUNITY

## 2020-01-22 RX ADMIN — OMALIZUMAB 150 MG: 150 INJECTION, SOLUTION SUBCUTANEOUS at 10:48

## 2020-01-22 RX ADMIN — OMALIZUMAB 150 MG: 150 INJECTION, SOLUTION SUBCUTANEOUS at 10:46

## 2020-01-22 NOTE — PROGRESS NOTES
[facility] Progress Note    Date: 2020    Name: Tere Kruse              MRN: [id number]              : 1950    Xolair Injection       Mr. Christi Anderson was assessed and education was provided. Mr. Sonia Hdz vitals were reviewed. Visit Vitals  /72 (BP 1 Location: Left arm, BP Patient Position: Sitting)   Pulse 86   Temp 97.1 °F (36.2 °C)   Resp 16   SpO2 98%     Xolair 300 mg SQ given in 2 equally dived doses of 150 mg per orders.     Xolair 150 mg SQ given in patient's Right abdomin and band aid applied to site.     Xolair 150 mg 150 mg SQ given in patient's left abdomin and band aid applied to site.        Mr. Rey tolerated injections, and had no complaints at this time. Mr. Christi Anderson was discharged from [place] [address] in stable condition at 1050. He is to return on 20 at 56 for his next appointment. Armband removed and shredded.     Gaviota Overton RN  2020  11:32 AM

## 2020-02-05 ENCOUNTER — HOSPITAL ENCOUNTER (OUTPATIENT)
Dept: INFUSION THERAPY | Age: 70
Discharge: HOME | End: 2020-02-05
Payer: MEDICARE

## 2020-02-05 VITALS
TEMPERATURE: 96.9 F | RESPIRATION RATE: 18 BRPM | DIASTOLIC BLOOD PRESSURE: 81 MMHG | HEART RATE: 86 BPM | SYSTOLIC BLOOD PRESSURE: 112 MMHG | OXYGEN SATURATION: 98 %

## 2020-02-05 DIAGNOSIS — J45.50 SEVERE PERSISTENT ASTHMA WITHOUT COMPLICATION: Primary | ICD-10-CM

## 2020-02-05 DIAGNOSIS — J45.50 SEVERE PERSISTENT ASTHMA WITHOUT COMPLICATION: ICD-10-CM

## 2020-02-05 PROCEDURE — 74011250636 HC RX REV CODE- 250/636: Performed by: INTERNAL MEDICINE

## 2020-02-05 PROCEDURE — 96372 THER/PROPH/DIAG INJ SC/IM: CPT

## 2020-02-05 RX ADMIN — OMALIZUMAB 150 MG: 150 INJECTION, SOLUTION SUBCUTANEOUS at 10:51

## 2020-02-05 NOTE — PROGRESS NOTES
[facility] Short Consult Note                  (Labs/Type & Cross/Portflush/Antibiotic/Non-Chemo injections)      Date: 2020    Name: Tere Kruse    MRN: [id number]         : 1950    Treatment: [address] Injection      Mr. Christi Anderson was assessed and education was provided. Denies any reaction from last injection. Yashira HaysSoniaart Hdz vitals were reviewed and patient was observed for 5 minutes prior to treatment. Visit Vitals  /81 (BP 1 Location: Left arm, BP Patient Position: Sitting)   Pulse 86   Temp 96.9 °F (36.1 °C)   Resp 18   SpO2 98%       Xolair 300 mg SQ given in two equally divided injections:      Xolair 150 mg SQ given in patient's right upper arm and band aid applied.      Xolair 150 mg SQ given in patient's left upper arm and band aid applied to site.      Mr. Rey tolerated injections, and had no complaints at this time. Mr. Christi Anderson was discharged from [place] [address] in stable condition at 1055. He is to return on 2020 at 56 for his next appointment for Xolair. Armband removed and shredded .     Elysia Estrada, RN  2020

## 2020-02-19 ENCOUNTER — HOSPITAL ENCOUNTER (OUTPATIENT)
Dept: INFUSION THERAPY | Age: 70
Discharge: HOME | End: 2020-02-19
Payer: MEDICARE

## 2020-02-19 VITALS
SYSTOLIC BLOOD PRESSURE: 117 MMHG | DIASTOLIC BLOOD PRESSURE: 79 MMHG | TEMPERATURE: 98.3 F | OXYGEN SATURATION: 97 % | HEART RATE: 100 BPM

## 2020-02-19 DIAGNOSIS — J45.50 SEVERE PERSISTENT ASTHMA WITHOUT COMPLICATION: Primary | ICD-10-CM

## 2020-02-19 DIAGNOSIS — J45.50 SEVERE PERSISTENT ASTHMA WITHOUT COMPLICATION: ICD-10-CM

## 2020-02-19 PROCEDURE — 74011250636 HC RX REV CODE- 250/636: Performed by: INTERNAL MEDICINE

## 2020-02-19 PROCEDURE — 96372 THER/PROPH/DIAG INJ SC/IM: CPT

## 2020-02-19 RX ADMIN — OMALIZUMAB 150 MG: 150 INJECTION, SOLUTION SUBCUTANEOUS at 10:48

## 2020-02-19 RX ADMIN — OMALIZUMAB 150 MG: 150 INJECTION, SOLUTION SUBCUTANEOUS at 10:46

## 2020-02-19 NOTE — PROGRESS NOTES
[facility] Short Consult Note                  (Labs/Type & Cross/Portflush/Antibiotic/Non-Chemo injections)      Date: 2020    Name: Eloina Maurer    MRN: [id number]         : 1950    Treatment: [facility]ne[facility] Injection      Mr. CHEN [facility] was assessed and education was provided. Denies any reaction from last injection. Mr. Herman Garcia vitals were reviewed and patient was observed for 5 minutes prior to treatment. Visit Vitals  /79 (BP 1 Location: Left arm, BP Patient Position: Sitting)   Pulse 100   Temp 98.3 °F (36.8 °C)   SpO2 97%       Xolair 300 mg SQ given in two equally divided injections:      Xolair 150 mg SQ given in patient's right upper arm and band aid applied.      Xolair 150 mg SQ given in patient's left upper arm and band aid applied to site.      Mr. Rey tolerated injections, and had no complaints at this time. Mr. CHEN [facility] was discharged from [place] [address] in stable condition at 1055. He is to return on 2020 at 1030 for his next appointment for Xolair. Armband removed and shredded .     Genie Kirk RN  2020

## 2020-03-04 ENCOUNTER — HOSPITAL ENCOUNTER (OUTPATIENT)
Dept: INFUSION THERAPY | Age: 70
Discharge: HOME | End: 2020-03-04
Payer: MEDICARE

## 2020-03-04 VITALS
HEART RATE: 89 BPM | RESPIRATION RATE: 18 BRPM | SYSTOLIC BLOOD PRESSURE: 135 MMHG | TEMPERATURE: 98.8 F | DIASTOLIC BLOOD PRESSURE: 97 MMHG | OXYGEN SATURATION: 95 %

## 2020-03-04 DIAGNOSIS — J45.50 SEVERE PERSISTENT ASTHMA WITHOUT COMPLICATION: Primary | ICD-10-CM

## 2020-03-04 DIAGNOSIS — J45.50 SEVERE PERSISTENT ASTHMA WITHOUT COMPLICATION: ICD-10-CM

## 2020-03-04 PROCEDURE — 74011250636 HC RX REV CODE- 250/636: Performed by: INTERNAL MEDICINE

## 2020-03-04 PROCEDURE — 96372 THER/PROPH/DIAG INJ SC/IM: CPT

## 2020-03-04 RX ADMIN — OMALIZUMAB 150 MG: 150 INJECTION, SOLUTION SUBCUTANEOUS at 10:49

## 2020-03-04 RX ADMIN — OMALIZUMAB 150 MG: 150 INJECTION, SOLUTION SUBCUTANEOUS at 10:48

## 2020-03-04 NOTE — PROGRESS NOTES
[facility] Short Consult Note                  (Labs/Type & Cross/Portflush/Antibiotic/Non-Chemo injections)      Date: 2020    Name: Alena Nieto    MRN: [id number]         : 1950    Treatment: Georgetta Angel Injection      Mr. Sridhar Arroyo was assessed and education was provided. Denies any reaction from last injection. Mr. Kinza Santillan vitals were reviewed and patient was observed for 5 minutes prior to treatment. Visit Vitals  BP (!) 135/97 (BP 1 Location: Left arm, BP Patient Position: Sitting)   Pulse 89   Temp 98.8 °F (37.1 °C)   Resp 18   SpO2 95%       Xolair 300 mg SQ given in two equally divided injections:      Xolair 150 mg SQ given in patient's right upper arm and band aid applied.      Xolair 150 mg SQ given in patient's left upper arm and band aid applied to site.      Mr. Rey tolerated injections, and had no complaints at this time. Mr. Sridhar Arroyo was discharged from [facility] in stable condition at 1055. He is to return on 2020 at 56 for his next appointment for Xolair. Armband removed and shredded .     Frank Rucker RN  2020

## 2020-03-18 ENCOUNTER — HOSPITAL ENCOUNTER (OUTPATIENT)
Dept: INFUSION THERAPY | Age: 70
Discharge: HOME | End: 2020-03-18
Payer: MEDICARE

## 2020-03-18 VITALS
RESPIRATION RATE: 16 BRPM | OXYGEN SATURATION: 97 % | TEMPERATURE: 96.6 F | HEART RATE: 88 BPM | SYSTOLIC BLOOD PRESSURE: 134 MMHG | DIASTOLIC BLOOD PRESSURE: 85 MMHG

## 2020-03-18 DIAGNOSIS — J45.50 SEVERE PERSISTENT ASTHMA WITHOUT COMPLICATION: Primary | ICD-10-CM

## 2020-03-18 DIAGNOSIS — J45.50 SEVERE PERSISTENT ASTHMA WITHOUT COMPLICATION: ICD-10-CM

## 2020-03-18 PROCEDURE — 96372 THER/PROPH/DIAG INJ SC/IM: CPT

## 2020-03-18 PROCEDURE — 74011250636 HC RX REV CODE- 250/636: Performed by: INTERNAL MEDICINE

## 2020-03-18 RX ADMIN — OMALIZUMAB 150 MG: 150 INJECTION, SOLUTION SUBCUTANEOUS at 10:40

## 2020-03-18 NOTE — PROGRESS NOTES
[facility] Short Consult Note                  (Labs/Type & Cross/Portflush/Antibiotic/Non-Chemo injections)      Date: 2020    Name: Lauren Squires    MRN: [id number]         : 1950    Treatment: [place] Injection    Mr. Alo Baker was assessed and education was provided. Denies any reaction from last injection. Mr. Kassi Nino vitals were reviewed and patient was observed for 5 minutes prior to treatment. Visit Vitals  /85 (BP 1 Location: Left arm, BP Patient Position: Sitting)   Pulse 88   Temp 96.6 °F (35.9 °C)   Resp 16   SpO2 97%       Xolair 300 mg SQ given in two equally divided injections:      Xolair 150 mg SQ given in patient's right upper arm and band aid applied.      Xolair 150 mg SQ given in patient's left upper arm and band aid applied to site.      Mr. Rey tolerated injections, and had no complaints at this time. Mr. Alo Baker was discharged from [place] [address] in stable condition at 1040. He is to return on 2020 at 56 for his next appointment for Xolair. Armband removed and shredded .     Neha Castañeda RN  2020

## 2020-04-01 ENCOUNTER — HOSPITAL ENCOUNTER (OUTPATIENT)
Dept: INFUSION THERAPY | Age: 70
Discharge: HOME | End: 2020-04-01
Payer: MEDICARE

## 2020-04-01 VITALS
DIASTOLIC BLOOD PRESSURE: 92 MMHG | TEMPERATURE: 98.3 F | RESPIRATION RATE: 16 BRPM | OXYGEN SATURATION: 97 % | SYSTOLIC BLOOD PRESSURE: 154 MMHG | HEART RATE: 86 BPM

## 2020-04-01 DIAGNOSIS — J45.50 SEVERE PERSISTENT ASTHMA WITHOUT COMPLICATION: Primary | ICD-10-CM

## 2020-04-01 DIAGNOSIS — J45.50 SEVERE PERSISTENT ASTHMA WITHOUT COMPLICATION: ICD-10-CM

## 2020-04-01 PROCEDURE — 74011250636 HC RX REV CODE- 250/636: Performed by: INTERNAL MEDICINE

## 2020-04-01 PROCEDURE — 96372 THER/PROPH/DIAG INJ SC/IM: CPT

## 2020-04-01 RX ADMIN — OMALIZUMAB 150 MG: 150 INJECTION, SOLUTION SUBCUTANEOUS at 11:10

## 2020-04-01 RX ADMIN — OMALIZUMAB 150 MG: 150 INJECTION, SOLUTION SUBCUTANEOUS at 11:12

## 2020-04-01 NOTE — PROGRESS NOTES
[facility] Short Consult Note                  (Labs/Type & Cross/Portflush/Antibiotic/Non-Chemo injections)      Date: 2020    Name: Felicia Farmer    MRN: [id number]         : 1950    Treatment: [place] Injection    Mr. Davon Yost was assessed and education was provided. Denies any reaction from last injection. Mr. Madisyn Hylton vitals were reviewed and patient was observed for 5 minutes prior to treatment. Visit Vitals  BP (!) 154/92 (BP 1 Location: Left arm, BP Patient Position: Sitting)   Pulse 86   Temp 98.3 °F (36.8 °C)   Resp 16   SpO2 97%       Xolair 300 mg SQ given in two equally divided injections:      Xolair 150 mg SQ given in patient's right upper arm and band aid applied.      Xolair 150 mg SQ given in patient's left upper arm and band aid applied to site.      Mr. Rey tolerated injections, and had no complaints at this time. Mr. Davon Yost was discharged from [place] [address] in stable condition at 1115. He is to return on 04/15/2020 at 56 for his next appointment for Xolair. Armband removed and shredded .     Gordo Dorantes RN  2020

## 2020-04-15 DIAGNOSIS — J45.50 SEVERE PERSISTENT ASTHMA WITHOUT COMPLICATION: ICD-10-CM

## 2020-04-29 DIAGNOSIS — J45.50 SEVERE PERSISTENT ASTHMA WITHOUT COMPLICATION: ICD-10-CM

## 2020-05-06 ENCOUNTER — HOSPITAL ENCOUNTER (OUTPATIENT)
Dept: INFUSION THERAPY | Age: 70
Discharge: HOME | End: 2020-05-06
Payer: MEDICARE

## 2020-05-06 VITALS
SYSTOLIC BLOOD PRESSURE: 127 MMHG | DIASTOLIC BLOOD PRESSURE: 78 MMHG | OXYGEN SATURATION: 100 % | TEMPERATURE: 97 F | RESPIRATION RATE: 16 BRPM | HEART RATE: 96 BPM

## 2020-05-06 DIAGNOSIS — J45.40 MODERATE PERSISTENT ASTHMA WITHOUT COMPLICATION: ICD-10-CM

## 2020-05-06 DIAGNOSIS — J30.1 ALLERGIC RHINITIS DUE TO POLLEN: ICD-10-CM

## 2020-05-06 DIAGNOSIS — J45.50 SEVERE PERSISTENT ASTHMA WITHOUT COMPLICATION: Primary | ICD-10-CM

## 2020-05-06 PROCEDURE — 96372 THER/PROPH/DIAG INJ SC/IM: CPT

## 2020-05-06 PROCEDURE — 74011250636 HC RX REV CODE- 250/636: Performed by: INTERNAL MEDICINE

## 2020-05-06 RX ADMIN — OMALIZUMAB 150 MG: 150 INJECTION, SOLUTION SUBCUTANEOUS at 10:41

## 2020-05-06 RX ADMIN — OMALIZUMAB 150 MG: 150 INJECTION, SOLUTION SUBCUTANEOUS at 10:43

## 2020-05-06 NOTE — PROGRESS NOTES
[facility] Short Consult Note                  (Labs/Type & Cross/Portflush/Antibiotic/Non-Chemo injections)      Date: May 6, 2020    Name: Miryam Barrientos    MRN: [id number]         : 1950    Treatment: [place] Injection    Mr. Laron Dandy was assessed and education was provided. Denies any reaction from last injection. Dr. Monica Ochoa office contacted for order renewal, office will fax order to [facility] today. Mr. Richard Jeffries vitals were reviewed and patient was observed for 5 minutes prior to treatment. Visit Vitals  /78 (BP 1 Location: Left arm, BP Patient Position: Sitting)   Pulse 96   Temp 97 °F (36.1 °C)   Resp 16   SpO2 100%       Xolair 300 mg SQ given in two equally divided injections:      Xolair 150 mg SQ given in patient's right upper arm and band aid applied.      Xolair 150 mg SQ given in patient's left upper arm and band aid applied to site.      Mr. Rey tolerated injections, and had no complaints at this time. Mr. Laron Dandy was discharged from [place] [address] in stable condition at 1045. He is to return on 2020 at 1030 for his next appointment for Xolair. Armband removed and shredded .     Yanira Dickey RN  May 6, 2020

## 2020-05-07 NOTE — TELEPHONE ENCOUNTER
This is part of his asthma regimen, for which he sees pulmonary. Medication needs to be managed by them to ensure proper surveillance of control.  MARE

## 2020-05-07 NOTE — TELEPHONE ENCOUNTER
Patient was last seen in the office for allergic rhinitis on 4/23/2019. Can his follow up be pushed back for refills or will he need VV now, please advise.

## 2020-05-08 NOTE — TELEPHONE ENCOUNTER
As below, it's part of his asthma regimen as well. Please direct him to schedule follow up with pulm and I'll provide interval supply.  MARE

## 2020-05-11 RX ORDER — MONTELUKAST SODIUM 10 MG/1
TABLET ORAL
Qty: 90 TAB | Refills: 3 | OUTPATIENT
Start: 2020-05-11

## 2020-05-11 NOTE — TELEPHONE ENCOUNTER
Called patient  verified he has been made aware he should contact Pulmonary to have his medication refilled and was told Dr. Alicia Corrales will send in a interval supply for him is needed. He says he will contact that office and will call us back if he needs to. Refills not needed at this time.

## 2020-05-20 ENCOUNTER — HOSPITAL ENCOUNTER (OUTPATIENT)
Dept: INFUSION THERAPY | Age: 70
Discharge: HOME | End: 2020-05-20
Payer: MEDICARE

## 2020-05-20 VITALS
DIASTOLIC BLOOD PRESSURE: 79 MMHG | RESPIRATION RATE: 16 BRPM | OXYGEN SATURATION: 98 % | SYSTOLIC BLOOD PRESSURE: 127 MMHG | HEART RATE: 97 BPM | TEMPERATURE: 97.7 F

## 2020-05-20 DIAGNOSIS — J45.50 SEVERE PERSISTENT ASTHMA WITHOUT COMPLICATION: ICD-10-CM

## 2020-05-20 DIAGNOSIS — J45.50 SEVERE PERSISTENT ASTHMA WITHOUT COMPLICATION: Primary | ICD-10-CM

## 2020-05-20 PROCEDURE — 96372 THER/PROPH/DIAG INJ SC/IM: CPT

## 2020-05-20 PROCEDURE — 74011250636 HC RX REV CODE- 250/636: Performed by: INTERNAL MEDICINE

## 2020-05-20 RX ADMIN — OMALIZUMAB 150 MG: 150 INJECTION, SOLUTION SUBCUTANEOUS at 10:54

## 2020-05-20 RX ADMIN — OMALIZUMAB 150 MG: 150 INJECTION, SOLUTION SUBCUTANEOUS at 10:52

## 2020-05-20 NOTE — PROGRESS NOTES
[facility] Short Consult Note                  (Labs/Type & Cross/Portflush/Antibiotic/Non-Chemo injections)      Date: May 20, 2020    Name: Lauren Squires    MRN: [id number]         : 1950    Treatment: [address] Injection    Mr. Alo Baker was assessed and education was provided. Denies any reaction from last injection. Mr. Kassi Nino vitals were reviewed and patient was observed for 5 minutes prior to treatment. Visit Vitals  /79 (BP 1 Location: Left arm, BP Patient Position: Sitting)   Pulse 97   Temp 97.7 °F (36.5 °C)   Resp 16   SpO2 98%       Xolair 300 mg SQ given in two equally divided injections:      Xolair 150 mg SQ given in patient's right upper arm and band aid applied.      Xolair 150 mg SQ given in patient's left upper arm and band aid applied to site.      Mr. Rey tolerated injections, and had no complaints at this time. Mr. Alo Baker was discharged from [place] [address] in stable condition at 1100. He is to return on 2020 at 1030 for his next appointment for Xolair. Armband removed and shredded .     Ofelia Zaidi RN  May 20, 2020

## 2020-05-26 ENCOUNTER — VIRTUAL VISIT (OUTPATIENT)
Dept: FAMILY MEDICINE CLINIC | Age: 70
End: 2020-05-26

## 2020-05-26 DIAGNOSIS — Z71.89 ADVANCED DIRECTIVES, COUNSELING/DISCUSSION: ICD-10-CM

## 2020-05-26 DIAGNOSIS — Z13.31 SCREENING FOR DEPRESSION: ICD-10-CM

## 2020-05-26 DIAGNOSIS — Z12.11 SCREEN FOR COLON CANCER: ICD-10-CM

## 2020-05-26 DIAGNOSIS — Z00.00 MEDICARE ANNUAL WELLNESS VISIT, SUBSEQUENT: Primary | ICD-10-CM

## 2020-05-26 DIAGNOSIS — Z12.5 SPECIAL SCREENING FOR MALIGNANT NEOPLASM OF PROSTATE: ICD-10-CM

## 2020-05-26 DIAGNOSIS — Z13.39 SCREENING FOR ALCOHOLISM: ICD-10-CM

## 2020-05-26 NOTE — PATIENT INSTRUCTIONS
Medicare Wellness Visit, Male The best way to live healthy is to have a lifestyle where you eat a well-balanced diet, exercise regularly, limit alcohol use, and quit all forms of tobacco/nicotine, if applicable. Regular preventive services are another way to keep healthy. Preventive services (vaccines, screening tests, monitoring & exams) can help personalize your care plan, which helps you manage your own care. Screening tests can find health problems at the earliest stages, when they are easiest to treat. [facility] follows the current, evidence-based guidelines published by the [facility] ([facility]) when recommending preventive services for our patients. Because we follow these guidelines, sometimes recommendations change over time as research supports it. (For example, a prostate screening blood test is no longer routinely recommended for men with no symptoms). Of course, you and your doctor may decide to screen more often for some diseases, based on your risk and co-morbidities (chronic disease you are already diagnosed with). Preventive services for you include: - Medicare offers their members a free annual wellness visit, which is time for you and your primary care provider to discuss and plan for your preventive service needs. Take advantage of this benefit every year! 
-All adults over age 72 should receive the recommended pneumonia vaccines. Current USPSTF guidelines recommend a series of two vaccines for the best pneumonia protection.  
-All adults should have a flu vaccine yearly and tetanus vaccine every 10 years. 
-All adults age 48 and older should receive the shingles vaccines (series of two vaccines).       
-All adults age 38-68 who are overweight should have a diabetes screening test once every three years.  
-Other screening tests & preventive services for persons with diabetes include: an eye exam to screen for diabetic retinopathy, a kidney function test, a foot exam, and stricter control over your cholesterol.  
-Cardiovascular screening for adults with routine risk involves an electrocardiogram (ECG) at intervals determined by the provider.  
-Colorectal cancer screening should be done for adults age 54-65 with no increased risk factors for colorectal cancer. There are a number of acceptable methods of screening for this type of cancer. Each test has its own benefits and drawbacks. Discuss with your provider what is most appropriate for you during your annual wellness visit. The different tests include: colonoscopy (considered the best screening method), a fecal occult blood test, a fecal DNA test, and sigmoidoscopy. 
-All adults born between [facility] should be screened once for Hepatitis C. 
-An Abdominal Aortic Aneurysm (AAA) Screening is recommended for men age 73-68 who has ever smoked in their lifetime. Here is a list of your current Health Maintenance items (your personalized list of preventive services) with a due date: 
Health Maintenance Due Topic Date Due  Shingles Vaccine (1 of 2) 08/02/1999  Glaucoma Screening   12/01/2015 [address] [place] [address] Annual Well Visit  04/23/2020  Colonoscopy  06/18/2020 Prostate Cancer Screening: Care Instructions Your Care Instructions The prostate gland is an organ found just below a man's bladder. It is the size and shape of a walnut. It surrounds the tube that carries urine from the bladder out of the body through the penis. This tube is called the urethra. Prostate cancer is the abnormal growth of cells in the prostate. It is the second most common type of cancer in men. (Skin cancer is the most common.) Most cases of prostate cancer occur in men older than 72. The disease runs in families. And it's more common in -American men. When it's found and treated early, prostate cancer may be cured.  But it is not always treated. This is because prostate cancer may not shorten your life, especially if you are older and the cancer is growing slowly. Follow-up care is a key part of your treatment and safety. Be sure to make and go to all appointments, and call your doctor if you are having problems. It's also a good idea to know your test results and keep a list of the medicines you take. What are the screening tests for prostate cancer? The main screening test for prostate cancer is the prostate-specific antigen (PSA) test. This is a blood test that measures how much PSA is in your blood. A high level may mean that you have an enlargement, an infection, or cancer. Along with the PSA test, you may have a digital rectal exam. The digital (finger) rectal exam checks for anything abnormal in your prostate. To do the exam, the doctor puts a lubricated, gloved finger into your rectum. If these tests suggest cancer, you may need a prostate biopsy. How is prostate cancer diagnosed? In a biopsy, the doctor takes small tissue samples from your prostate gland. Another doctor then looks at the tissue under a microscope to see if there are cancer cells, signs of infection, or other problems. The results help diagnose prostate cancer. What are the pros and cons of screening? Neither a PSA test nor a digital rectal exam can tell you for sure that you do or do not have cancer. But they can help you decide if you need more tests, such as a prostate biopsy. Screening tests may be useful because most men with prostate cancer don't have symptoms. It can be hard to know if you have cancer until it is more advanced. And then it's harder to treat. But having a PSA test can also cause harm. The test may show high levels of PSA that aren't caused by cancer. So you could have a prostate biopsy you didn't need. Or the PSA test might be normal when there is cancer, so a cancer might not be found early.  The test can also find cancers that would never have caused a problem during your lifetime. So you might have treatment that was not needed. Prostate cancer usually develops late in life and grows slowly. For many men, it does not shorten their lives. Some experts advise screening only for men who are at high risk. Talk with your doctor to see if screening is right for you. Where can you learn more? Go to http://fredis-haja.info/ Enter R550 in the search box to learn more about \"Prostate Cancer Screening: Care Instructions. \" Current as of: August 21, 2019Content Version: 12.4 © 7744-2243 [employer]. Care instructions adapted under license by [employer] (which disclaims liability or warranty for this information). If you have questions about a medical condition or this instruction, always ask your healthcare professional. [address] any warranty or liability for your use of this information. Colon Cancer Screening: Care Instructions Your Care Instructions Colorectal cancer occurs in the colon or rectum. That's the lower part of your digestive system. It is the second-leading cause of cancer deaths in the [place]. It often starts with small growths called polyps in the colon or rectum. Polyps are usually found with screening tests. Depending on the type of test, any polyps found may be removed during the tests. Colorectal cancer usually does not cause symptoms at first. But regular tests can help find it early, before it spreads and becomes harder to treat. Experts advise routine tests for colon cancer for people starting at age 48. And they advise people with a higher risk of colon cancer to get tested sooner. Talk with your doctor about when you should start testing. Discuss which tests you need. Follow-up care is a key part of your treatment and safety.  Be sure to make and go to all appointments, and call your doctor if you are having problems. It's also a good idea to know your test results and keep a list of the medicines you take. What are the main screening tests for colon cancer? · Stool tests. These include the fecal immunochemical test (FIT) and the fecal occult blood test (FOBT). These tests check stool samples for signs of cancer. If your test is positive, you will need to have a colonoscopy. · Sigmoidoscopy. This test lets your doctor look at the lining of your rectum and the lowest part of your colon. Your doctor uses a lighted tube called a sigmoidoscope. This test can't find cancers or polyps in the upper part of your colon. In some cases, polyps that are found can be removed. But if your doctor finds polyps, you will need to have a colonoscopy to check the upper part of your colon. · Colonoscopy. This test lets your doctor look at the lining of your rectum and your entire colon. The doctor uses a thin, flexible tool called a colonoscope. It can also be used to remove polyps or get a tissue sample (biopsy). What tests do you need? The following guidelines are for people age 48 and over who are not at high risk for colorectal cancer. You may have at least one of these tests as directed by your doctor. · Fecal immunochemical test (FIT) or fecal occult blood test (FOBT) every year · Sigmoidoscopy every 5 years · Colonoscopy every 10 years If you are age 68 to 80, you can work with your doctor to decide if screening is a good option. If you are age 80 or older, your doctor will likely advise that screening is not helpful. Talk with your doctor about when you need to be tested. And discuss which tests are right for you. Your doctor may recommend earlier or more frequent testing if you: 
· Have had colorectal cancer before. · Have had colon polyps. · Have symptoms of colorectal cancer. These include blood in your stool and changes in your bowel habits.  
· Have a parent, brother or sister, or child with colon polyps or colorectal cancer. · Have a bowel disease. This includes ulcerative colitis and Crohn's disease. · Have a rare polyp syndrome that runs in families, such as familial adenomatous polyposis (FAP). · Have had radiation treatments to the belly or pelvis. When should you call for help? Watch closely for changes in your health, and be sure to contact your doctor if: 
  · You have any changes in your bowel habits.  
  · You have any problems. Where can you learn more? Go to http://fredis-haja.info/. Enter M541 in the search box to learn more about \"Colon Cancer Screening: Care Instructions. \" Current as of: March 28, 2018 Content Version: 11.8 © 4854-6843 [employer]. Care instructions adapted under license by [employer] (which disclaims liability or warranty for this information). If you have questions about a medical condition or this instruction, always ask your healthcare professional. [address] any warranty or liability for your use of this information.

## 2020-05-26 NOTE — PROGRESS NOTES
This is the Subsequent Medicare Annual Wellness Exam, performed 12 months or more after the Initial AWV or the last Subsequent AWV    Consent: Desire Myles, who was seen by synchronous (real-time) audio-video technology, and/or his healthcare decision maker, is aware that this patient-initiated, Telehealth encounter on 5/26/2020 is a billable service. While AWVs are fully covered by Medicare, any services rendered on this date that are not included in an AWV are subject to additional billing, with coverage as determined by his insurance carrier. He is aware that he may receive a bill for any such additional services and has provided verbal consent to proceed: Yes. I have reviewed the patient's medical history in detail and updated the computerized patient record.      History     Patient Active Problem List   Diagnosis Code    Vitamin D deficiency E55.9    Allergic rhinitis J30.9    Severe persistent asthma without complication H70.74    Lumbosacral radiculopathy due to degenerative joint disease of spine M47.27    Cervical radiculopathy due to degenerative joint disease of spine M47.22    Elevated PSA R97.20    BPH with obstruction/lower urinary tract symptoms N40.1, N13.8    Family history of prostate cancer Z80.42    Hyperlipidemia K45.0    Diastolic dysfunction D50.59    Essential hypertension I10    Other specified abnormal immunological findings in serum R76.8    Lumbar spondylosis M47.816    Depression F32.9    Cubital tunnel syndrome on right G56.21    Gastroesophageal reflux disease without esophagitis K21.9     Past Medical History:   Diagnosis Date    Arthritis     Asthma     Back problem     Burning with urination     Cough     Depression     GERD (gastroesophageal reflux disease)     Poor appetite     Trouble in sleeping     Wheezing       Past Surgical History:   Procedure Laterality Date    ABDOMEN SURGERY PROC UNLISTED      hernia repair 6/20/18    HX COLONOSCOPY      HX ORCHIECTOMY  1999    left, varicocele with complications    HX OTHER SURGICAL      epidural injection      Current Outpatient Medications   Medication Sig Dispense Refill    epinephrine (EPIPEN 2-LUZ MARIA INJECTION)       cycloSPORINE (RESTASIS) 0.05 % dpet Administer 1 Drop to both eyes every twelve (12) hours.  montelukast (SINGULAIR) 10 mg tablet Take 1 Tab by mouth daily. 90 Tab 4    fluticasone-salmeterol (ADVAIR DISKUS) 100-50 mcg/dose diskus inhaler Take 1 Puff by inhalation every twelve (12) hours.  omalizumab (XOLAIR) 150 mg solr by SubCUTAneous route every fourteen (14) days.  cetirizine-psuedoePHEDrine (ZYRTEC-D) 5-120 mg per tablet Take 1 Tab by mouth daily. 90 Tab 4    Omeprazole delayed release (PRILOSEC D/R) 20 mg tablet Take 1 Tab by mouth daily. (Patient taking differently: Take 20 mg by mouth as needed.) 90 Tab 4    levalbuterol tartrate (XOPENEX) 45 mcg/actuation inhaler Take 2 Puffs by inhalation every four (4) hours as needed for Wheezing. 1 Inhaler 4    levalbuterol (XOPENEX) 1.25 mg/3 mL nebu 3 mL by Nebulization route every six (6) hours as needed. 1 Package 2    aspirin 81 mg chewable tablet Take 1 Tab by mouth daily. 90 Tab 4    albuterol (PROVENTIL VENTOLIN) 2.5 mg /3 mL (0.083 %) nebulizer solution 3 mL by Nebulization route every four (4) hours as needed for Wheezing. 1 Package 1    FA/MV-MN/MIN AA JANETT/SAW PAL (SAW PALMETO-MULTIVIT-MIN-AA-FA PO) Take 1 Tab by mouth daily.  Cholecalciferol, Vitamin D3, (VITAMIN D) 2,000 unit Cap Take 1 Cap by mouth daily. 30 Cap 11     Allergies   Allergen Reactions    Latex Hives and Itching    Cashew Nut Other (comments)     \"UPSET STOMACH\"    Milk Diarrhea    Other Medication Other (comments)     Pt allergic to cats with respiratory, skin reactions.   Pt allergic to cashews with upset stomach    Vicodin [Hydrocodone-Acetaminophen] Other (comments)     hallucinations       Family History   Problem Relation Age of Onset  Hypertension Mother     Heart Disease Mother     Arthritis-rheumatoid Mother     Hypertension Father     Heart Attack Father 48    Cancer Brother 72        prostate    Cancer Brother 61        prostate    Colon Cancer Brother     Heart Attack Brother 48    Cancer Brother 66        prostate    Diabetes Brother     Heart Attack Sister 61    Colon Polyps Sister     Heart Attack Brother 54    Heart Attack Sister 72     Social History     Tobacco Use    Smoking status: Never Smoker    Smokeless tobacco: Never Used   Substance Use Topics    Alcohol use: Yes     Alcohol/week: 8.0 standard drinks     Types: 8 Cans of beer per week     Comment: occ       Depression Risk Factor Screening:     3 most recent PHQ Screens 5/26/2020   Little interest or pleasure in doing things Not at all   Feeling down, depressed, irritable, or hopeless Not at all   Total Score PHQ 2 0   Trouble falling or staying asleep, or sleeping too much -   Feeling tired or having little energy -   Poor appetite, weight loss, or overeating -   Feeling bad about yourself - or that you are a failure or have let yourself or your family down -   Trouble concentrating on things such as school, work, reading, or watching TV -   Moving or speaking so slowly that other people could have noticed; or the opposite being so fidgety that others notice -   Thoughts of being better off dead, or hurting yourself in some way -   PHQ 9 Score -   How difficult have these problems made it for you to do your work, take care of your home and get along with others -       Alcohol Risk Factor Screening (MALE > 65): Do you average more 1 drink per night or more than 7 drinks a week: No    In the past three months have you have had more than 4 drinks containing alcohol on one occasion: No      Functional Ability and Level of Safety:   Hearing: Hearing is good. Activities of Daily Living:   The home contains: grab bars  Patient does total self care    Ambulation: with no difficulty    Fall Risk:  Fall Risk Assessment, last 12 mths 5/26/2020   Able to walk? Yes   Fall in past 12 months? No       Abuse Screen:  Patient is not abused    Cognitive Screening   Has your family/caregiver stated any concerns about your memory: no  Cognitive Screening: Normal - obs    Patient Care Team   Patient Care Team:  Jarocho Farley MD as PCP - General (Family Practice)  Jarocho Farley MD as PCP - [facility] Empaneled Provider  Jarocho Farley MD (Family Practice)  Jose Alexander MD as Surgeon (Surgery)  Anais Lay MD (Rheumatology)  Angelic Mosley MD (Orthopedic Surgery)  Donavan Wu MD (Pulmonary Disease)    Assessment/Plan   Education and counseling provided:  Are appropriate based on today's review and evaluation    Diagnoses and all orders for this visit:    1. Medicare annual wellness visit, subsequent    2. Special screening for malignant neoplasm of prostate  -     PSA SCREENING (SCREENING); Future    3. Screening for alcoholism  -     [place] ANNUAL ALCOHOL SCREEN 15 MIN    4. Screening for depression  -     DEPRESSION SCREEN ANNUAL    5. Screen for colon cancer  -     REFERRAL FOR COLONOSCOPY    6.  Advanced directives, counseling/discussion  -     ADVANCE CARE PLANNING FIRST [facility] Due   Topic Date Due    Shingrix Vaccine Age 49> (1 of 2) 08/02/1999    GLAUCOMA SCREENING Q2Y  12/01/2015    Medicare Yearly Exam  04/23/2020    Colonoscopy  06/18/2020   had recent eye exam and surgery to remove film behind lens    Lab Results   Component Value Date/Time    Prostate Specific Ag 2.3 04/05/2018 10:25 AM    Prostate Specific Ag 1.9 04/04/2017 10:47 AM    Prostate Specific Ag 2.32 02/04/2015 02:25 PM    Prostate Specific Ag 3.1 01/08/2015 02:08 PM    Prostate Specific Ag 1.8 10/18/2012 01:30 PM    Prostate Specific Ag 3.5 04/21/2010 08:36 PM       Robbie Aguilera is a 78 y.o. male who was evaluated by an audio-video encounter for concerns as above. Patient identification was verified prior to start of the visit. A caregiver was present when appropriate. Due to this being a TeleHealth encounter (During RBOJC-11 public health emergency), evaluation of the following organ systems was limited: Vitals/Constitutional/EENT/Resp/CV/GI//MS/Neuro/Skin/Heme-Lymph-Imm. Pursuant to the emergency declaration under the [facility], [facility] waiver authority and the [employer] Act, this Virtual Visit was conducted, with patient's (and/or legal guardian's) consent, to reduce the patient's risk of exposure to COVID-19 and provide necessary medical care. Services were provided through a synchronous discussion virtually to substitute for in-person clinic visit. I was at home. The patient was in the office.       Db Martin MD

## 2020-05-26 NOTE — ACP (ADVANCE CARE PLANNING)
Advance Care Planning       Advance Care Planning (ACP) Physician/NP/PA (Provider) Conversation        Date of ACP Conversation: 5/26/2020    Conversation Conducted with:   Patient with [address] Maker:    Current Designated Health Care Decision Maker:   Primary Decision Maker: Garett Loaiza - Spouse - [phone]    Secondary Decision Maker: Syl Jiménez - Son - [phone]  (If there is a [address] named in the \"Healthcare Decision Makers\" box in the ACP activity, but it is not visible above, be sure to open that field and then select the health care decision maker relationship (ie \"primary\") in the blank space to the right of the name.)        Care Preferences:    Hospitalization: \"If your health worsens and it becomes clear that your chance of recovery is unlikely, what would your preference be regarding hospitalization? \"  If the patient would want hospitalization, answer \"yes\". If the patient would prefer comfort-focused treatment without hospitalization, answer \"no\". no      Ventilation: \"If you were in your present state of health and suddenly became very ill and were unable to breathe on your own, what would your preference be about the use of a ventilator (breathing machine) if it was available to you? \"    If patient would desire the use of a ventilator (breathing machine), answer \"yes\", if not answer \"no\":no      Resuscitation:  \"CPR works best to restart the heart when there is a sudden event, like a heart attack, in someone who is otherwise healthy. Unfortunately, CPR does not typically restart the heart for people who have serious health conditions or who are very sick. \"    \"In the event your heart stopped as a result of an underlying serious health condition, would you want attempts to be made to restart your heart (answer \"yes\" for attempt to resuscitate) or would you prefer a natural death (answer \"no\" for do not attempt to resuscitate)? \"   no    NOTE: If the patient has a valid advance directive AND provides care preference(s) that are inconsistent with that prior directive, advise the patient to consider either: creating a new advance directive that complies with state-specific requirements; or, if that is not possible, orally revoking that prior directive in accordance with state-specific requirements, which must be documented in the EHR.     Conversation Outcomes / Follow-Up Plan:   Upload ACP documents through [facility]      Length of Voluntary ACP Conversation in minutes:  12 minutes      Becca Danielson MD

## 2020-06-03 ENCOUNTER — HOSPITAL ENCOUNTER (OUTPATIENT)
Dept: INFUSION THERAPY | Age: 70
Discharge: HOME | End: 2020-06-03
Payer: MEDICARE

## 2020-06-03 VITALS
RESPIRATION RATE: 16 BRPM | HEART RATE: 80 BPM | SYSTOLIC BLOOD PRESSURE: 117 MMHG | DIASTOLIC BLOOD PRESSURE: 66 MMHG | TEMPERATURE: 98.5 F | OXYGEN SATURATION: 98 %

## 2020-06-03 DIAGNOSIS — J45.50 SEVERE PERSISTENT ASTHMA WITHOUT COMPLICATION: ICD-10-CM

## 2020-06-03 DIAGNOSIS — J45.50 SEVERE PERSISTENT ASTHMA WITHOUT COMPLICATION: Primary | ICD-10-CM

## 2020-06-03 PROCEDURE — 96372 THER/PROPH/DIAG INJ SC/IM: CPT

## 2020-06-03 PROCEDURE — 74011250636 HC RX REV CODE- 250/636: Performed by: INTERNAL MEDICINE

## 2020-06-03 RX ADMIN — OMALIZUMAB 150 MG: 150 INJECTION, SOLUTION SUBCUTANEOUS at 10:44

## 2020-06-03 NOTE — PROGRESS NOTES
[facility] Short Consult Note                  (Labs/Type & Cross/Portflush/Antibiotic/Non-Chemo injections)      Date: Danielle 3, 2020    Name: Lilia Alfonso    MRN: [id number]         : 1950    Treatment: Arvid Hasten Injection    Mr. Christen Simpson was assessed and education was provided. Denies any reaction from last injection. Mr. Sea Montesinos vitals were reviewed and patient was observed for 5 minutes prior to treatment. Visit Vitals  /66 (BP 1 Location: Left arm, BP Patient Position: Sitting)   Pulse 80   Temp 98.5 °F (36.9 °C)   Resp 16   SpO2 98%       Xolair 300 mg SQ given in two equally divided injections:      Xolair 150 mg SQ given in patient's right upper arm and band aid applied.      Xolair 150 mg SQ given in patient's left upper arm and band aid applied to site.      Mr. Rey tolerated injections, and had no complaints at this time. Mr. Christen Simpson was discharged from [place] [address] in stable condition at 1100. He is to return on 2020 at 1030 for his next appointment for Xolair. Armband removed and shredded .     Sydnie Reaves RN  Danielle 3, 2020

## 2020-06-17 ENCOUNTER — HOSPITAL ENCOUNTER (OUTPATIENT)
Dept: INFUSION THERAPY | Age: 70
Discharge: HOME | End: 2020-06-17
Payer: MEDICARE

## 2020-06-17 VITALS
DIASTOLIC BLOOD PRESSURE: 74 MMHG | OXYGEN SATURATION: 98 % | HEART RATE: 87 BPM | RESPIRATION RATE: 16 BRPM | SYSTOLIC BLOOD PRESSURE: 122 MMHG | TEMPERATURE: 96.8 F

## 2020-06-17 DIAGNOSIS — J45.50 SEVERE PERSISTENT ASTHMA WITHOUT COMPLICATION: ICD-10-CM

## 2020-06-17 DIAGNOSIS — J45.50 SEVERE PERSISTENT ASTHMA WITHOUT COMPLICATION: Primary | ICD-10-CM

## 2020-06-17 PROCEDURE — 96372 THER/PROPH/DIAG INJ SC/IM: CPT

## 2020-06-17 PROCEDURE — 74011250636 HC RX REV CODE- 250/636: Performed by: INTERNAL MEDICINE

## 2020-06-17 RX ADMIN — OMALIZUMAB 150 MG: 150 INJECTION, SOLUTION SUBCUTANEOUS at 10:40

## 2020-06-17 RX ADMIN — OMALIZUMAB 150 MG: 150 INJECTION, SOLUTION SUBCUTANEOUS at 10:39

## 2020-06-17 NOTE — PROGRESS NOTES
[address] [facility] Short Consult Note        Date: 2020    Name: Felicia Farmer    MRN: [id number]         : 1950    Treatment: Holly Ferries Injection      Mr. Davon Yost was assessed and education was provided. Denies any reaction from last injection. Mr. Madisyn Hylton vitals were reviewed and patient was observed for 5 minutes prior to treatment. Visit Vitals  /74 (BP 1 Location: Left arm, BP Patient Position: Sitting)   Pulse 87   Temp 96.8 °F (36 °C)   Resp 16   SpO2 98%       Xolair 300 mg SQ given in two equally divided injections:      Xolair 150 mg SQ given in patient's right upper arm and band aid applied.      Xolair 150 mg SQ given in patient's left upper arm and band aid applied to site.      Mr. Rey tolerated injections, and had no complaints at this time. Mr. Davon Yost was discharged from [place] [address] in stable condition at 1055. He is to return on2020 at 1030 for his next appointment for Xolair. Armband removed and shredded .     Shay Ray RN  2020

## 2020-06-25 ENCOUNTER — HOSPITAL ENCOUNTER (OUTPATIENT)
Dept: LAB | Age: 70
Discharge: HOME | End: 2020-06-25
Payer: MEDICARE

## 2020-06-25 DIAGNOSIS — R97.20 ELEVATED PSA, LESS THAN 10 NG/ML: Primary | ICD-10-CM

## 2020-06-25 LAB — PSA SERPL-MCNC: 5.8 NG/ML (ref 0–4)

## 2020-06-25 PROCEDURE — 84153 ASSAY OF PSA TOTAL: CPT

## 2020-06-25 PROCEDURE — 36415 COLL VENOUS BLD VENIPUNCTURE: CPT

## 2020-06-25 PROCEDURE — 84154 ASSAY OF PSA FREE: CPT

## 2020-06-25 NOTE — PROGRESS NOTES
Mellisa, please call the lab to add PSA fractionation (free and total) to the specimen they have there.  MARE

## 2020-06-26 NOTE — PROGRESS NOTES
Spoke with Arely Whelan at [facility] to request add-on lab for PSA, Total & Free. She verbalized understanding.  Order has been faxed to [phone] per [facility] request.

## 2020-06-27 LAB
PSA FREE MFR SERPL: 16.5 %
PSA FREE SERPL-MCNC: 0.86 NG/ML
PSA SERPL-MCNC: 5.2 NG/ML (ref 0–4)

## 2020-06-30 NOTE — PROGRESS NOTES
Fede Méndez, as you can see, the results here are not normal. I am certainly prepared to discuss the potential significance (emphasizing \"potential\") or can simply refer you to a urologist for that conversation as well as potential options. Please let me know your preference.  MARE

## 2020-07-01 ENCOUNTER — HOSPITAL ENCOUNTER (OUTPATIENT)
Dept: INFUSION THERAPY | Age: 70
Discharge: HOME | End: 2020-07-01
Payer: MEDICARE

## 2020-07-01 VITALS
SYSTOLIC BLOOD PRESSURE: 132 MMHG | OXYGEN SATURATION: 98 % | TEMPERATURE: 97.2 F | DIASTOLIC BLOOD PRESSURE: 98 MMHG | HEART RATE: 88 BPM | RESPIRATION RATE: 16 BRPM

## 2020-07-01 DIAGNOSIS — J45.50 SEVERE PERSISTENT ASTHMA WITHOUT COMPLICATION: ICD-10-CM

## 2020-07-01 DIAGNOSIS — J45.50 SEVERE PERSISTENT ASTHMA WITHOUT COMPLICATION: Primary | ICD-10-CM

## 2020-07-01 PROCEDURE — 74011250636 HC RX REV CODE- 250/636: Performed by: INTERNAL MEDICINE

## 2020-07-01 PROCEDURE — 96372 THER/PROPH/DIAG INJ SC/IM: CPT

## 2020-07-01 RX ADMIN — OMALIZUMAB 150 MG: 150 INJECTION, SOLUTION SUBCUTANEOUS at 10:43

## 2020-07-01 NOTE — PROGRESS NOTES
[facility] Short Consult Note        Date: 2020    Name: Don Silveira    MRN: [id number]         : 1950    Treatment: [address] Injection      Mr. Ricardo Hubbard was assessed and education was provided. Denies any reaction from last injection. Mr. Elisabeth Stephens vitals were reviewed and patient was observed for 5 minutes prior to treatment. Visit Vitals  BP (!) 132/98 (BP 1 Location: Left arm, BP Patient Position: Sitting)   Pulse 88   Temp 97.2 °F (36.2 °C)   Resp 16   SpO2 98%       Xolair 300 mg SQ given in two equally divided injections:      Xolair 150 mg SQ given in patient's right upper arm and band aid applied.      Xolair 150 mg SQ given in patient's left upper arm and band aid applied to site.      Mr. Rey tolerated injections, and had no complaints at this time. Mr. Ricardo Hubbard was discharged from [place] [address] in stable condition at 1045. He is to return on 7/15/2020 at 56 for his next appointment for Xolair. Armband removed and shredded .     Christine Jacobo RN  2020

## 2020-07-02 ENCOUNTER — PATIENT MESSAGE (OUTPATIENT)
Dept: FAMILY MEDICINE CLINIC | Age: 70
End: 2020-07-02

## 2020-07-02 DIAGNOSIS — N40.1 BPH WITH OBSTRUCTION/LOWER URINARY TRACT SYMPTOMS: Primary | ICD-10-CM

## 2020-07-02 DIAGNOSIS — R97.20 ELEVATED PSA: ICD-10-CM

## 2020-07-02 DIAGNOSIS — Z80.42 FAMILY HISTORY OF PROSTATE CANCER: ICD-10-CM

## 2020-07-02 DIAGNOSIS — R97.20 ELEVATED PSA, LESS THAN 10 NG/ML: ICD-10-CM

## 2020-07-02 DIAGNOSIS — N13.8 BPH WITH OBSTRUCTION/LOWER URINARY TRACT SYMPTOMS: Primary | ICD-10-CM

## 2020-07-02 NOTE — PROGRESS NOTES
Patient did call the office back and has been made aware Dr. Abena Knowles has sent him a my chart message on his results patient says he will check this as soon as he gets home, told feel free to respond via my chart or call the office back.

## 2020-07-02 NOTE — PROGRESS NOTES
Peter Teixeira, please call him to draw attention to my [employer] message. While the results do not require immediate attention, I can work in a phone encounter between 1-3 this afternoon if he wishes.  MARE

## 2020-07-02 NOTE — PROGRESS NOTES
Called patient no answer left message asking that he review his mychart message and/or call the office back, office number left.

## 2020-07-02 NOTE — TELEPHONE ENCOUNTER
From: Charley Doss  To: Nat Cadet MD  Sent: 7/2/2020 2:50 PM EDT  Subject: Referral Request    Thanks for your careful attention.  Please send a urologist referral. LWms

## 2020-07-15 ENCOUNTER — HOSPITAL ENCOUNTER (OUTPATIENT)
Dept: INFUSION THERAPY | Age: 70
Discharge: HOME | End: 2020-07-15
Payer: MEDICARE

## 2020-07-15 VITALS
RESPIRATION RATE: 16 BRPM | HEART RATE: 86 BPM | OXYGEN SATURATION: 98 % | DIASTOLIC BLOOD PRESSURE: 78 MMHG | SYSTOLIC BLOOD PRESSURE: 142 MMHG | TEMPERATURE: 98.2 F

## 2020-07-15 DIAGNOSIS — J45.50 SEVERE PERSISTENT ASTHMA WITHOUT COMPLICATION: ICD-10-CM

## 2020-07-15 DIAGNOSIS — J45.50 SEVERE PERSISTENT ASTHMA WITHOUT COMPLICATION: Primary | ICD-10-CM

## 2020-07-15 PROCEDURE — 74011250636 HC RX REV CODE- 250/636: Performed by: INTERNAL MEDICINE

## 2020-07-15 PROCEDURE — 96372 THER/PROPH/DIAG INJ SC/IM: CPT

## 2020-07-15 RX ADMIN — OMALIZUMAB 150 MG: 150 INJECTION, SOLUTION SUBCUTANEOUS at 14:03

## 2020-07-15 RX ADMIN — OMALIZUMAB 150 MG: 150 INJECTION, SOLUTION SUBCUTANEOUS at 14:02

## 2020-07-15 NOTE — PROGRESS NOTES
[facility] Short Consult Note        Date: July 15, 2020    Name: Alena Nieto    MRN: [id number]         : 1950    Treatment: [address] Injection      Mr. Sridhar Arroyo was assessed and education was provided. Denies any reaction from last injection. Mr. Kinza Santillan vitals were reviewed and patient was observed for 5 minutes prior to treatment. Visit Vitals  /78 (BP 1 Location: Left arm, BP Patient Position: Sitting)   Pulse 86   Temp 98.2 °F (36.8 °C)   Resp 16   SpO2 98%       Xolair 300 mg SQ given in two equally divided injections:      Xolair 150 mg SQ given in patient's right upper arm and band aid applied.      Xolair 150 mg SQ given in patient's left upper arm and band aid applied to site.      Mr. Rey tolerated injections, and had no complaints at this time. Mr. Sridhar Arroyo was discharged from [place] [address] in stable condition at 1410. He is to return on 2020 at 21 [phone] for his next appointment for Xolair. Armband removed and shredded .     Anette Truong RN  July 15, 2020

## 2020-07-29 ENCOUNTER — HOSPITAL ENCOUNTER (OUTPATIENT)
Dept: INFUSION THERAPY | Age: 70
Discharge: HOME | End: 2020-07-29
Payer: MEDICARE

## 2020-07-29 VITALS
SYSTOLIC BLOOD PRESSURE: 111 MMHG | OXYGEN SATURATION: 98 % | DIASTOLIC BLOOD PRESSURE: 79 MMHG | RESPIRATION RATE: 18 BRPM | TEMPERATURE: 98.4 F | HEART RATE: 80 BPM

## 2020-07-29 DIAGNOSIS — J45.50 SEVERE PERSISTENT ASTHMA WITHOUT COMPLICATION: Primary | ICD-10-CM

## 2020-07-29 DIAGNOSIS — J45.50 SEVERE PERSISTENT ASTHMA WITHOUT COMPLICATION: ICD-10-CM

## 2020-07-29 PROCEDURE — 74011250636 HC RX REV CODE- 250/636: Performed by: INTERNAL MEDICINE

## 2020-07-29 PROCEDURE — 96372 THER/PROPH/DIAG INJ SC/IM: CPT

## 2020-07-29 RX ADMIN — OMALIZUMAB 150 MG: 150 INJECTION, SOLUTION SUBCUTANEOUS at 10:48

## 2020-07-29 RX ADMIN — OMALIZUMAB 150 MG: 150 INJECTION, SOLUTION SUBCUTANEOUS at 10:50

## 2020-07-29 NOTE — PROGRESS NOTES
[facility] Short Consult Note        Date: 2020    Name: Tere Kruse    MRN: [id number]         : 1950    Treatment: [address] Injection      Mr. Christi Anderson was assessed and education was provided. Denies any reaction from last injection. Mr. Sonia Hdz vitals were reviewed and patient was observed for 5 minutes prior to treatment. Visit Vitals  /79 (BP 1 Location: Left arm, BP Patient Position: Sitting)   Pulse 80   Temp 98.4 °F (36.9 °C)   Resp 18   SpO2 98%       Xolair 300 mg SQ given in two equally divided injections:      Xolair 150 mg SQ given in patient's right upper arm and band aid applied.      Xolair 150 mg SQ given in patient's left upper arm and band aid applied to site.      Mr. Rey tolerated injections, and had no complaints at this time. Mr. Christi Anderson was discharged from [place] [address] in stable condition at 1055. He is to return on 2020 at 56 for his next appointment for Xolair. Armband removed and shredded .     Ari Torrse RN  2020

## 2020-08-12 ENCOUNTER — HOSPITAL ENCOUNTER (OUTPATIENT)
Dept: INFUSION THERAPY | Age: 70
End: 2020-08-12

## 2020-08-12 DIAGNOSIS — J45.50 SEVERE PERSISTENT ASTHMA WITHOUT COMPLICATION: ICD-10-CM

## 2020-08-26 ENCOUNTER — APPOINTMENT (OUTPATIENT)
Dept: INFUSION THERAPY | Age: 70
End: 2020-08-26

## 2020-08-26 DIAGNOSIS — J45.50 SEVERE PERSISTENT ASTHMA WITHOUT COMPLICATION: ICD-10-CM

## 2020-09-09 ENCOUNTER — APPOINTMENT (OUTPATIENT)
Dept: INFUSION THERAPY | Age: 70
End: 2020-09-09

## 2020-09-09 DIAGNOSIS — J45.50 SEVERE PERSISTENT ASTHMA WITHOUT COMPLICATION: ICD-10-CM

## 2020-09-23 ENCOUNTER — APPOINTMENT (OUTPATIENT)
Dept: INFUSION THERAPY | Age: 70
End: 2020-09-23

## 2020-09-23 DIAGNOSIS — J45.50 SEVERE PERSISTENT ASTHMA WITHOUT COMPLICATION: ICD-10-CM

## 2020-10-01 ENCOUNTER — TELEPHONE (OUTPATIENT)
Dept: FAMILY MEDICINE CLINIC | Age: 70
End: 2020-10-01

## 2020-10-01 ENCOUNTER — HOSPITAL ENCOUNTER (OUTPATIENT)
Dept: INFUSION THERAPY | Age: 70
Discharge: HOME | End: 2020-10-01
Payer: MEDICARE

## 2020-10-01 VITALS
OXYGEN SATURATION: 98 % | RESPIRATION RATE: 16 BRPM | TEMPERATURE: 98.7 F | HEART RATE: 80 BPM | DIASTOLIC BLOOD PRESSURE: 90 MMHG | SYSTOLIC BLOOD PRESSURE: 146 MMHG

## 2020-10-01 DIAGNOSIS — J45.50 SEVERE PERSISTENT ASTHMA WITHOUT COMPLICATION: Primary | ICD-10-CM

## 2020-10-01 PROCEDURE — 74011250636 HC RX REV CODE- 250/636: Performed by: INTERNAL MEDICINE

## 2020-10-01 PROCEDURE — 96372 THER/PROPH/DIAG INJ SC/IM: CPT

## 2020-10-01 RX ADMIN — OMALIZUMAB 150 MG: 150 INJECTION, SOLUTION SUBCUTANEOUS at 09:31

## 2020-10-01 RX ADMIN — OMALIZUMAB 150 MG: 150 INJECTION, SOLUTION SUBCUTANEOUS at 09:32

## 2020-10-01 NOTE — TELEPHONE ENCOUNTER
Jayden Asher is calling to request a prescription for Flexeril. He said Dr. Sanford Alfaro has prescribed this before.  He can be reached back at [phone]

## 2020-10-01 NOTE — PROGRESS NOTES
[facility] Short Consult Note        Date: 2020    Name: Paul Amador    MRN: [id number]         : 1950    Treatment: Erica Dolin Injection    Mr. Aurelio Marks was assessed and education was provided. Denies any reaction from last injection. Pt endorses neck and back pain. Mr. Jayy Mejia vitals were reviewed and patient was observed for 5 minutes prior to treatment. Visit Vitals  BP (!) 146/90 (BP 1 Location: Left arm, BP Patient Position: Sitting)   Pulse 80   Temp 98.7 °F (37.1 °C)   Resp 16   SpO2 98%       Xolair 300 mg SQ given in two equally divided injections:      Xolair 150 mg SQ given in patient's right upper arm and band aid applied.      Xolair 150 mg SQ given in patient's left upper arm and band aid applied to site.      Mr. Rey tolerated injections, and had no complaints at this time. Mr. Aurelio Marks was discharged from [facility] in stable condition at 1055. He is to return on  at 56 for his next appointment for Xolair. Armband removed and shredded .     Charlene Rios RN  2020

## 2020-10-05 NOTE — TELEPHONE ENCOUNTER
This was last prescribed for patient on 2017 when patient was seen for f/u on [facility]. Appt is required. Called patient  verified told I was returning his call about refills on his Flexeril patient states he was seen at the ED on Friday at [facility] and no longer needs the refills.

## 2020-10-06 ENCOUNTER — VIRTUAL VISIT (OUTPATIENT)
Dept: FAMILY MEDICINE CLINIC | Age: 70
End: 2020-10-06
Payer: MEDICARE

## 2020-10-06 DIAGNOSIS — R41.3 MEMORY LOSS: Primary | ICD-10-CM

## 2020-10-06 DIAGNOSIS — G47.00 INSOMNIA, UNSPECIFIED TYPE: ICD-10-CM

## 2020-10-06 DIAGNOSIS — F45.8 OTHER SOMATOFORM DISORDERS: ICD-10-CM

## 2020-10-06 PROCEDURE — G9717 DOC PT DX DEP/BP F/U NT REQ: HCPCS | Performed by: FAMILY MEDICINE

## 2020-10-06 PROCEDURE — 1101F PT FALLS ASSESS-DOCD LE1/YR: CPT | Performed by: FAMILY MEDICINE

## 2020-10-06 PROCEDURE — 99214 OFFICE O/P EST MOD 30 MIN: CPT | Performed by: FAMILY MEDICINE

## 2020-10-06 PROCEDURE — G8427 DOCREV CUR MEDS BY ELIG CLIN: HCPCS | Performed by: FAMILY MEDICINE

## 2020-10-06 PROCEDURE — 3017F COLORECTAL CA SCREEN DOC REV: CPT | Performed by: FAMILY MEDICINE

## 2020-10-06 PROCEDURE — G8756 NO BP MEASURE DOC: HCPCS | Performed by: FAMILY MEDICINE

## 2020-10-06 RX ORDER — TRAZODONE HYDROCHLORIDE 50 MG/1
50 TABLET ORAL
Qty: 30 TAB | Refills: 1 | Status: SHIPPED | OUTPATIENT
Start: 2020-10-06 | End: 2020-11-17

## 2020-10-06 NOTE — PATIENT INSTRUCTIONS
Look for the CBT-I dona. Learning About Sleeping Well What does sleeping well mean? Sleeping well means getting enough sleep. How much sleep is enough varies among people. The number of hours you sleep is not as important as how you feel when you wake up. If you do not feel refreshed, you probably need more sleep. Another sign of not getting enough sleep is feeling tired during the day. The average total nightly sleep time is 7½ to 8 hours. Healthy adults may need a little more or a little less than this. Why is getting enough sleep important? Getting enough quality sleep is a basic part of good health. When your sleep suffers, your mood and your thoughts can suffer too. You may find yourself feeling more grumpy or stressed. Not getting enough sleep also can lead to serious problems, including injury, accidents, anxiety, and depression. What might cause poor sleeping? Many things can cause sleep problems, including: · Stress. Stress can be caused by fear about a single event, such as giving a speech. Or you may have ongoing stress, such as worry about work or school. · Depression, anxiety, and other mental or emotional conditions. · Changes in your sleep habits or surroundings. This includes changes that happen where you sleep, such as noise, light, or sleeping in a different bed. It also includes changes in your sleep pattern, such as having jet lag or working a late shift. · Health problems, such as pain, breathing problems, and restless legs syndrome. · Lack of regular exercise. How can you help yourself? Here are some tips that may help you sleep more soundly and wake up feeling more refreshed. Your sleeping area · Use your bedroom only for sleeping and sex. A bit of light reading may help you fall asleep. But if it doesn't, do your reading elsewhere in the house. Don't watch TV in bed. · Be sure your bed is big enough to stretch out comfortably, especially if you have a sleep partner. · Keep your bedroom quiet, dark, and cool. Use curtains, blinds, or a sleep mask to block out light. To block out noise, use earplugs, soothing music, or a \"white noise\" machine. Your evening and bedtime routine · Create a relaxing bedtime routine. You might want to take a warm shower or bath, listen to soothing music, or drink a cup of noncaffeinated tea. · Go to bed at the same time every night. And get up at the same time every morning, even if you feel tired. What to avoid · Limit caffeine (coffee, tea, caffeinated sodas) during the day, and don't have any for at least 4 to 6 hours before bedtime. · Don't drink alcohol before bedtime. Alcohol can cause you to wake up more often during the night. · Don't smoke or use tobacco, especially in the evening. Nicotine can keep you awake. · Don't take naps during the day, especially close to bedtime. · Don't lie in bed awake for too long. If you can't fall asleep, or if you wake up in the middle of the night and can't get back to sleep within 15 minutes or so, get out of bed and go to another room until you feel sleepy. · Don't take medicine right before bed that may keep you awake or make you feel hyper or energized. Your doctor can tell you if your medicine may do this and if you can take it earlier in the day. If you can't sleep · Imagine yourself in a peaceful, pleasant scene. Focus on the details and feelings of being in a place that is relaxing. · Get up and do a quiet or boring activity until you feel sleepy. · Don't drink any liquids after 6 p.m. if you wake up often because you have to go to the bathroom. Where can you learn more? Go to http://www.gray.com/ Enter W112 in the search box to learn more about \"Learning About Sleeping Well. \" Current as of: January 31, 2020               Content Version: 12.6 © 8824-4271 [employer].   
Care instructions adapted under license by [employer] (which disclaims liability or warranty for this information). If you have questions about a medical condition or this instruction, always ask your healthcare professional. [address] any warranty or liability for your use of this information.

## 2020-10-06 NOTE — PROGRESS NOTES
Patient says he has some concerns with is short term memory he says he has forgotten doctors appts. He says one appt he had gotten a text the same morning and still forgot. He also c/o getting loss when he was on his way home one day. 1. Have you been to the ER, urgent care clinic since your last visit? Hospitalized since your last visit? Patient says he was seen at [facility] on lasgt thursday and ED on last Friday for muscle spasm  2. Have you seen or consulted any other health care providers outside of the [address] [place] [address] since your last visit? Include any pap smears or colon screening. No    Medication reconciliation has been completed with patient. Care team discussed/updated as well as pharmacy.     Health Maintenance Due   Topic Date Due    Shingrix Vaccine Age 49> (1 of 2) 08/02/1999    GLAUCOMA SCREENING Q2Y  12/01/2015    Colonoscopy  06/18/2020    Flu Vaccine (1) 09/01/2020    DTaP/Tdap/Td series (2 - Td) 09/09/2020

## 2020-10-06 NOTE — PROGRESS NOTES
Meagan Fermin is a 69 y.o. male who was seen by synchronous (real-time) audio-video technology on 10/6/2020 for No chief complaint on file. Assessment & Plan:   Diagnoses and all orders for this visit:    1. Memory loss  -     CBC WITH AUTOMATED DIFF; Future  -     METABOLIC PANEL, COMPREHENSIVE; Future  -     T PALLIDUM AB; Future  -     VITAMIN B12 & FOLATE; Future  -     FERRITIN; Future  -     IRON PROFILE; Future  -     TSH W/ REFLX FREE T4 IF ABNORMAL; Future  -     HIV 1/2 AG/AB, 4TH GENERATION,W RFLX CONFIRM; Future    2. Insomnia, unspecified type  -     traZODone (DESYREL) 50 mg tablet; Take 1 Tab by mouth nightly. 3. Other somatoform disorders   -     FERRITIN; Future  -     IRON PROFILE; Future    memory issues likely due to sleep deprivation and work related stress with so many COVID funerals  Encouraged to continue with peer support  CBT-I dona and sleep hygeine    Follow-up and Dispositions    · Return in about 6 weeks (around 11/17/2020) for insomnia and memory loss follow up, subject to change based on results. 712  Subjective:   Re[facility] care [facility] for elevated PSA. Biopsy planned in Nov.    short term memory problems in past 2 months: has forgotten doctors appts. Missed a month of infusion treatments. He says one appt he had gotten a text the same morning and still forgot. Got lost on his way home one day. Sleep: 3-4 hours/night - \"lots of things on my mind\" workload, lots of funerals including friends. Both falling asleep and staying asleep. \"I sleep when I'm exhausted. \" for 10 hours.   Herbal teas have been helpful with initiation  OTC sleep agent not helpful    3 most recent PHQ Screens 10/6/2020   Little interest or pleasure in doing things Not at all   Feeling down, depressed, irritable, or hopeless Several days   Total Score PHQ 2 1   Trouble falling or staying asleep, or sleeping too much Not at all   Feeling tired or having little energy Not at all Poor appetite, weight loss, or overeating Not at all   Feeling bad about yourself - or that you are a failure or have let yourself or your family down Not at all   Trouble concentrating on things such as school, work, reading, or watching TV Not at all   Moving or speaking so slowly that other people could have noticed; or the opposite being so fidgety that others notice Not at all   Thoughts of being better off dead, or hurting yourself in some way Not at all   PHQ 9 Score 1   How difficult have these problems made it for you to do your work, take care of your home and get along with others Not difficult at all     Lab Results   Component Value Date/Time    WBC 2.9 (L) 11/28/2018 01:15 PM    HGB 14.8 11/28/2018 01:15 PM    HCT 43.3 11/28/2018 01:15 PM    PLATELET 278 09/53/5251 01:15 PM    MCV 93.9 11/28/2018 01:15 PM     Lab Results   Component Value Date/Time    Sodium 136 11/28/2018 01:15 PM    Potassium 5.0 11/28/2018 01:15 PM    Chloride 100 11/28/2018 01:15 PM    CO2 31 11/28/2018 01:15 PM    Anion gap 5 11/28/2018 01:15 PM    Glucose 105 (H) 11/28/2018 01:15 PM    BUN 20 (H) 11/28/2018 01:15 PM    Creatinine 1.24 11/28/2018 01:15 PM    BUN/Creatinine ratio 16 11/28/2018 01:15 PM    GFR est AA >60 11/28/2018 01:15 PM    GFR est non-AA 58 (L) 11/28/2018 01:15 PM    Calcium 9.0 11/28/2018 01:15 PM    Bilirubin, total 0.7 11/28/2018 01:15 PM    Alk. phosphatase 65 11/28/2018 01:15 PM    Protein, total 8.0 11/28/2018 01:15 PM    Albumin 3.7 11/28/2018 01:15 PM    Globulin 4.3 (H) 11/28/2018 01:15 PM    A-G Ratio 0.9 11/28/2018 01:15 PM    ALT (SGPT) 28 11/28/2018 01:15 PM    AST (SGOT) 22 11/28/2018 01:15 PM     No results found for: B12LT, FOL, RBCF         Prior to Admission medications    Medication Sig Start Date End Date Taking? Authorizing Provider   cycloSPORINE (RESTASIS) 0.05 % dpet Administer 1 Drop to both eyes every twelve (12) hours.    Yes Provider, Historical   montelukast (SINGULAIR) 10 mg tablet Take 1 Tab by mouth daily. 4/23/19  Yes Nikita Ruby MD   fluticasone-salmeterol (ADVAIR DISKUS) 100-50 mcg/dose diskus inhaler Take 1 Puff by inhalation every twelve (12) hours. Yes Provider, Historical   omalizumab Alley Knife) 150 mg solr by SubCUTAneous route every fourteen (14) days. Yes Provider, Historical   cetirizine-psuedoePHEDrine (ZYRTEC-D) 5-120 mg per tablet Take 1 Tab by mouth daily. 9/1/17  Yes Nikita Ruby MD   Omeprazole delayed release (PRILOSEC D/R) 20 mg tablet Take 1 Tab by mouth daily. Patient taking differently: Take 20 mg by mouth as needed. 9/1/17  Yes Nikita Ruby MD   levalbuterol tartrate Hayde Romeo) 45 mcg/actuation inhaler Take 2 Puffs by inhalation every four (4) hours as needed for Wheezing. 1/31/17  Yes Nikita Ruby MD   aspirin 81 mg chewable tablet Take 1 Tab by mouth daily. 3/3/16  Yes Nikita Ruby MD   FA/MV-MN/MIN AA JANETT/SAW PAL (SAW PALMETO-MULTIVIT-MIN-AA-FA PO) Take 1 Tab by mouth daily. Yes Provider, Historical   Cholecalciferol, Vitamin D3, (VITAMIN D) 2,000 unit Cap Take 1 Cap by mouth daily. 1/19/11  Yes Tony Zacarias MD   levoFLOXacin (Levaquin) 750 mg tablet Take 1 Tab by mouth daily. Take one tab one hour prior to procedure. 10/1/20 10/6/20  FABIAN Callaway   epinephrine (EPIPEN 2-ROMEO INJECTION)  8/26/19   Provider, Historical   levalbuterol (XOPENEX) 1.25 mg/3 mL nebu 3 mL by Nebulization route every six (6) hours as needed. 7/29/16   Nikita Ruby MD   albuterol (PROVENTIL VENTOLIN) 2.5 mg /3 mL (0.083 %) nebulizer solution 3 mL by Nebulization route every four (4) hours as needed for Wheezing. 4/1/15   Nikita Ruby MD         ROS    Objective:   No flowsheet data found.    General: alert, cooperative, no distress   Mental  status: normal mood, behavior, speech, dress, motor activity, and thought processes, able to follow commands   HENT: NCAT   Neck: no visualized mass   Resp: no respiratory distress   Neuro: no gross deficits   Skin: no discoloration or lesions of concern on visible areas   Psychiatric: normal affect, consistent with stated mood, no evidence of hallucinations     Additional exam findings: We discussed the expected course, resolution and complications of the diagnosis(es) in detail. Medication risks, benefits, costs, interactions, and alternatives were discussed as indicated. I advised him to contact the office if his condition worsens, changes or fails to improve as anticipated. He expressed understanding with the diagnosis(es) and plan. Rachael Chanel, who was evaluated through a patient-initiated, synchronous (real-time) audio-video encounter, and/or his healthcare decision maker, is aware that it is a billable service, with coverage as determined by his insurance carrier. He provided verbal consent to proceed: Yes, and patient identification was verified. It was conducted pursuant to the emergency declaration under the [facility], [address] [place] [address] authority and the [employer] and [employer] General Act. A caregiver was present when appropriate. Ability to conduct physical exam was limited. I was at home. The patient was in his parked car.       Lyndon Sanz MD

## 2020-10-07 ENCOUNTER — APPOINTMENT (OUTPATIENT)
Dept: INFUSION THERAPY | Age: 70
End: 2020-10-07

## 2020-10-07 DIAGNOSIS — J45.50 SEVERE PERSISTENT ASTHMA WITHOUT COMPLICATION: ICD-10-CM

## 2020-10-15 ENCOUNTER — HOSPITAL ENCOUNTER (OUTPATIENT)
Dept: INFUSION THERAPY | Age: 70
Discharge: HOME | End: 2020-10-15
Payer: MEDICARE

## 2020-10-15 VITALS
TEMPERATURE: 97.8 F | DIASTOLIC BLOOD PRESSURE: 83 MMHG | OXYGEN SATURATION: 98 % | SYSTOLIC BLOOD PRESSURE: 130 MMHG | RESPIRATION RATE: 16 BRPM | HEART RATE: 80 BPM

## 2020-10-15 DIAGNOSIS — J45.50 SEVERE PERSISTENT ASTHMA WITHOUT COMPLICATION: Primary | ICD-10-CM

## 2020-10-15 PROCEDURE — 74011250636 HC RX REV CODE- 250/636: Performed by: INTERNAL MEDICINE

## 2020-10-15 PROCEDURE — 96372 THER/PROPH/DIAG INJ SC/IM: CPT

## 2020-10-15 RX ORDER — CYCLOBENZAPRINE HCL 5 MG
5 TABLET ORAL
COMMUNITY
End: 2020-11-16

## 2020-10-15 RX ADMIN — OMALIZUMAB 150 MG: 150 INJECTION, SOLUTION SUBCUTANEOUS at 09:36

## 2020-10-15 NOTE — PROGRESS NOTES
[facility] Short Consult Note        Date: October 15, 2020    Name: Jeffrey Dobson    MRN: [id number]         : 1950    Treatment: Sydna Punch Injection    Mr. Yasmin Keller was assessed and education was provided. Denies any reaction from last injection. Pt endorses neck spasms for which he has been prescribed a muscle relaxer flexeril. Pt states these are helping his spasms. Mr. Anika Katz vitals were reviewed and patient was observed for 5 minutes prior to treatment. Visit Vitals  /83 (BP 1 Location: Left arm, BP Patient Position: Sitting)   Pulse 80   Temp 97.8 °F (36.6 °C)   Resp 16   SpO2 98%       Xolair 300 mg SQ given in two equally divided injections:      Xolair 150 mg SQ given in patient's right upper arm and band aid applied.      Xolair 150 mg SQ given in patient's left upper arm and band aid applied to site.      Mr. Rey tolerated injections, and had no complaints at this time. Mr. Yasmin Keller was discharged from [place] [address] in stable condition at 1055. He is to return on 10/30/2020 at 56 for his next appointment for Xolair. Armband removed and shredded .     Marley Mejia RN  October 15, 2020

## 2020-10-21 DIAGNOSIS — J45.50 SEVERE PERSISTENT ASTHMA WITHOUT COMPLICATION: ICD-10-CM

## 2020-10-29 ENCOUNTER — HOSPITAL ENCOUNTER (OUTPATIENT)
Dept: INFUSION THERAPY | Age: 70
Discharge: HOME | End: 2020-10-29
Payer: MEDICARE

## 2020-10-29 VITALS
SYSTOLIC BLOOD PRESSURE: 128 MMHG | DIASTOLIC BLOOD PRESSURE: 76 MMHG | HEART RATE: 86 BPM | OXYGEN SATURATION: 98 % | RESPIRATION RATE: 16 BRPM | TEMPERATURE: 97.3 F

## 2020-10-29 DIAGNOSIS — J45.50 SEVERE PERSISTENT ASTHMA WITHOUT COMPLICATION: Primary | ICD-10-CM

## 2020-10-29 PROCEDURE — 96372 THER/PROPH/DIAG INJ SC/IM: CPT

## 2020-10-29 PROCEDURE — 74011250636 HC RX REV CODE- 250/636: Performed by: INTERNAL MEDICINE

## 2020-10-29 RX ADMIN — OMALIZUMAB 150 MG: 150 INJECTION, SOLUTION SUBCUTANEOUS at 09:35

## 2020-10-29 RX ADMIN — OMALIZUMAB 150 MG: 150 INJECTION, SOLUTION SUBCUTANEOUS at 09:37

## 2020-10-29 NOTE — PROGRESS NOTES
[facility] Short Consult Note        Date: 2020    Name: Sharan Gupta    MRN: [id number]         : 1950    Treatment: Queen [place]ster Injection    Mr. Mayco Jones was assessed and education was provided. Denies any reaction from last injection. Mr. Katiuska Carrington vitals were reviewed and patient was observed for 5 minutes prior to treatment. Patient Vitals for the past 12 hrs:   Temp Pulse Resp BP SpO2   10/29/20 0934 97.3 °F (36.3 °C) 86 16 128/76 98 %       Xolair 300 mg SQ given in two equally divided injections:      Xolair 150 mg SQ given in patient's right upper arm and band aid applied.      Xolair 150 mg SQ given in patient's left upper arm and band aid applied to site.      Mr. Rey tolerated injections, and had no complaints at this time. Mr. Mayco Jones was discharged from [place] [address] in stable condition at [address] He is to return on 2020 at 0930 for his next appointment for [facility]. Armband removed and shredded .     Vlad Root RN  2020

## 2020-11-04 DIAGNOSIS — J45.50 SEVERE PERSISTENT ASTHMA WITHOUT COMPLICATION: ICD-10-CM

## 2020-11-12 ENCOUNTER — HOSPITAL ENCOUNTER (OUTPATIENT)
Dept: INFUSION THERAPY | Age: 70
Discharge: HOME | End: 2020-11-12
Payer: MEDICARE

## 2020-11-12 VITALS
RESPIRATION RATE: 16 BRPM | HEART RATE: 76 BPM | SYSTOLIC BLOOD PRESSURE: 119 MMHG | DIASTOLIC BLOOD PRESSURE: 74 MMHG | TEMPERATURE: 98 F | OXYGEN SATURATION: 98 %

## 2020-11-12 DIAGNOSIS — J45.50 SEVERE PERSISTENT ASTHMA WITHOUT COMPLICATION: Primary | ICD-10-CM

## 2020-11-12 PROCEDURE — 96372 THER/PROPH/DIAG INJ SC/IM: CPT

## 2020-11-12 PROCEDURE — 74011250636 HC RX REV CODE- 250/636: Performed by: INTERNAL MEDICINE

## 2020-11-12 RX ADMIN — OMALIZUMAB 150 MG: 150 INJECTION, SOLUTION SUBCUTANEOUS at 09:51

## 2020-11-12 NOTE — PROGRESS NOTES
[facility] Short Consult Note        Date: 2020    Name: Gideon Funez    MRN: [id number]         : 1950    Treatment: [address] Injection    Mr. Jayden Asher was assessed and education was provided. Denies any reaction from last injection. Pt endorses neck spasms for which he has been prescribed a muscle relaxer flexeril. Pt states these are helping his spasms. Mr. Bella Coyle vitals were reviewed and patient was observed for 5 minutes prior to treatment. Visit Vitals  /74 (BP 1 Location: Left arm, BP Patient Position: Sitting)   Pulse 76   Temp 98 °F (36.7 °C)   Resp 16   SpO2 98%       Xolair 300 mg SQ given in two equally divided injections:      Xolair 150 mg SQ given in patient's right upper arm and band aid applied.      Xolair 150 mg SQ given in patient's left upper arm and band aid applied to site.      Mr. Rey tolerated injections, and had no complaints at this time. Mr. Jayden Asher was discharged from [place] [address] in stable condition at 1055. He is to return on 2020 at 1030 for his next appointment for Xolair. Armband removed and shredded .     Meenakshi Valles RN  2020

## 2020-11-13 ENCOUNTER — HOSPITAL ENCOUNTER (OUTPATIENT)
Dept: LAB | Age: 70
Discharge: HOME | End: 2020-11-13
Payer: MEDICARE

## 2020-11-13 DIAGNOSIS — R41.3 MEMORY LOSS: ICD-10-CM

## 2020-11-13 LAB
ALBUMIN SERPL-MCNC: 3.8 G/DL (ref 3.4–5)
ALBUMIN/GLOB SERPL: 0.9 {RATIO} (ref 0.8–1.7)
ALP SERPL-CCNC: 75 U/L (ref 45–117)
ALT SERPL-CCNC: 82 U/L (ref 16–61)
ANION GAP SERPL CALC-SCNC: 8 MMOL/L (ref 3–18)
AST SERPL-CCNC: 67 U/L (ref 10–38)
BASOPHILS # BLD: 0 K/UL (ref 0–0.06)
BASOPHILS NFR BLD: 1 % (ref 0–3)
BILIRUB SERPL-MCNC: 0.4 MG/DL (ref 0.2–1)
BUN SERPL-MCNC: 16 MG/DL (ref 7–18)
BUN/CREAT SERPL: 13 (ref 12–20)
CALCIUM SERPL-MCNC: 9.2 MG/DL (ref 8.5–10.1)
CHLORIDE SERPL-SCNC: 106 MMOL/L (ref 100–111)
CO2 SERPL-SCNC: 27 MMOL/L (ref 21–32)
CREAT SERPL-MCNC: 1.28 MG/DL (ref 0.6–1.3)
DIFFERENTIAL METHOD BLD: ABNORMAL
EOSINOPHIL # BLD: 0 K/UL (ref 0–0.4)
EOSINOPHIL NFR BLD: 1 % (ref 0–5)
ERYTHROCYTE [DISTWIDTH] IN BLOOD BY AUTOMATED COUNT: 12.6 % (ref 11.6–14.5)
FOLATE SERPL-MCNC: 12.5 NG/ML (ref 3.1–17.5)
GLOBULIN SER CALC-MCNC: 4.1 G/DL (ref 2–4)
GLUCOSE SERPL-MCNC: 100 MG/DL (ref 74–99)
HCT VFR BLD AUTO: 39.9 % (ref 36–48)
HGB BLD-MCNC: 13.8 G/DL (ref 13–16)
LYMPHOCYTES # BLD: 1.3 K/UL (ref 0.8–3.5)
LYMPHOCYTES NFR BLD: 49 % (ref 20–51)
MCH RBC QN AUTO: 32.3 PG (ref 24–34)
MCHC RBC AUTO-ENTMCNC: 34.6 G/DL (ref 31–37)
MCV RBC AUTO: 93.4 FL (ref 74–97)
MONOCYTES # BLD: 0.2 K/UL (ref 0–1)
MONOCYTES NFR BLD: 10 % (ref 2–9)
NEUTS BAND NFR BLD MANUAL: 1 % (ref 0–5)
NEUTS SEG # BLD: 0.9 K/UL (ref 1.8–8)
NEUTS SEG NFR BLD: 38 % (ref 42–75)
PLATELET # BLD AUTO: 233 K/UL (ref 135–420)
PLATELET COMMENTS,PCOM: ABNORMAL
PMV BLD AUTO: 10.8 FL (ref 9.2–11.8)
POTASSIUM SERPL-SCNC: 4.4 MMOL/L (ref 3.5–5.5)
PROT SERPL-MCNC: 7.9 G/DL (ref 6.4–8.2)
RBC # BLD AUTO: 4.27 M/UL (ref 4.7–5.5)
RBC MORPH BLD: ABNORMAL
SODIUM SERPL-SCNC: 141 MMOL/L (ref 136–145)
TSH SERPL-ACNC: 0.36 UIU/ML (ref 0.36–3.74)
VIT B12 SERPL-MCNC: 606 PG/ML (ref 211–911)
WBC # BLD AUTO: 2.4 K/UL (ref 4.6–13.2)

## 2020-11-13 PROCEDURE — 36415 COLL VENOUS BLD VENIPUNCTURE: CPT

## 2020-11-13 PROCEDURE — 80053 COMPREHEN METABOLIC PANEL: CPT

## 2020-11-13 PROCEDURE — 84443 ASSAY THYROID STIM HORMONE: CPT

## 2020-11-13 PROCEDURE — 86780 TREPONEMA PALLIDUM: CPT

## 2020-11-13 PROCEDURE — 87389 HIV-1 AG W/HIV-1&-2 AB AG IA: CPT

## 2020-11-13 PROCEDURE — 85025 COMPLETE CBC W/AUTO DIFF WBC: CPT

## 2020-11-13 PROCEDURE — 82607 VITAMIN B-12: CPT

## 2020-11-16 LAB
HIV 1+2 AB+HIV1 P24 AG SERPL QL IA: NONREACTIVE
HIV12 RESULT COMMENT, HHIVC: NORMAL
T PALLIDUM AB SER QL IA: NONREACTIVE

## 2020-11-17 ENCOUNTER — VIRTUAL VISIT (OUTPATIENT)
Dept: FAMILY MEDICINE CLINIC | Age: 70
End: 2020-11-17
Payer: MEDICARE

## 2020-11-17 ENCOUNTER — PATIENT MESSAGE (OUTPATIENT)
Dept: FAMILY MEDICINE CLINIC | Age: 70
End: 2020-11-17

## 2020-11-17 DIAGNOSIS — G47.00 INSOMNIA, UNSPECIFIED TYPE: Primary | ICD-10-CM

## 2020-11-17 DIAGNOSIS — R74.01 ELEVATED LIVER TRANSAMINASE LEVEL: ICD-10-CM

## 2020-11-17 PROCEDURE — 99442 PR PHYS/QHP TELEPHONE EVALUATION 11-20 MIN: CPT | Performed by: FAMILY MEDICINE

## 2020-11-17 RX ORDER — TRAZODONE HYDROCHLORIDE 100 MG/1
50 TABLET ORAL
Qty: 90 TAB | Refills: 4 | Status: SHIPPED | OUTPATIENT
Start: 2020-11-17 | End: 2021-03-26 | Stop reason: ALTCHOICE

## 2020-11-17 NOTE — PROGRESS NOTES
Patient being seen for follow up on his insomnia and memory, no concerns. 1. Have you been to the ER, urgent care clinic since your last visit? Hospitalized since your last visit? No  2. Have you seen or consulted any other health care providers outside of the [address] [place] [address] since your last visit? Include any pap smears or colon screening. No    Medication reconciliation has been completed with patient. Care team discussed/updated as well as pharmacy. Health Maintenance Due   Topic Date Due    Shingrix Vaccine Age 49> (1 of 2) 08/02/1999    GLAUCOMA SCREENING Q2Y  12/01/2015    Colorectal Cancer Screening Combo  06/18/2020    Flu Vaccine (1) 09/01/2020    DTaP/Tdap/Td series (2 - Td) 09/09/2020       Health Maintenance reviewed - Patient has not gotten flu vaccine, advised to schedule here for flu clinic or walk in to pharmacy.

## 2020-11-17 NOTE — PROGRESS NOTES
Karen Hill is a 69 y.o. male who was seen by synchronous (real-time) audio only technology on 11/17/2020 for Insomnia and Memory Loss        Assessment & Plan:   Diagnoses and all orders for this visit:    1. Insomnia, unspecified type  -     traZODone (DESYREL) 100 mg tablet; Take 0.5 Tabs by mouth nightly. 2. Elevated liver transaminase level      Modest improvement. Remains uncontrolled. Increasing dose. Support given. Etiology of elevated liver enz unclear. Few other elevated values a few years ago had appeared to normalize after stopping Vit e. Neg hep viral testing    Recommend imaging. Requesting to defer until after holidays which is reasonable. Would     awaitng prostate path results    Follow-up and Dispositions    · Return in about 3 weeks (around 12/8/2020) for insomnia follow up, may cancel if doing well, plan evaluation of liver early 2021. 712  Subjective:     FU memory issues likely due to sleep deprivation and work related stress with so many COVID funerals  Encouraged to continue with peer support  CBT-I dona and sleep hygiene  Added trazodone    LFTS:  Tylenol - approx 1-2 doses every 2-3 weeks  Vit e x years - none x years    Lab Results   Component Value Date/Time    Hepatitis A, IgM NEGATIVE  02/28/2016 01:55 PM    Hepatitis B surface Ag <0.10 02/28/2016 01:55 PM    Hepatitis B core, IgM NEGATIVE  02/28/2016 01:55 PM    Hep B surface Ag screen Negative 01/30/2013 01:03 PM    Hepatitis Be Antigen Negative 01/30/2013 01:03 PM    Hep B Core Ab, IgM Negative 01/30/2013 01:03 PM    Hep B Core Ab, total Negative 01/30/2013 01:03 PM    Hepatitis Be Antibody Negative 01/30/2013 01:03 PM    HCV Ab <0.1 01/30/2013 01:03 PM    Hepatitis C virus Ab <0.02 02/28/2016 01:55 PM         Prostate bx yesterday. Path pending.     Results for orders placed or performed during the hospital encounter of 11/13/20   CBC WITH AUTOMATED DIFF   Result Value Ref Range    WBC 2.4 (L) 4.6 - 13.2 K/uL    RBC 4.27 (L) 4.70 - 5.50 M/uL    HGB 13.8 13.0 - 16.0 g/dL    HCT 39.9 36.0 - 48.0 %    MCV 93.4 74.0 - 97.0 FL    MCH 32.3 24.0 - 34.0 PG    MCHC 34.6 31.0 - 37.0 g/dL    RDW 12.6 11.6 - 14.5 %    PLATELET 443 733 - 286 K/uL    MPV 10.8 9.2 - 11.8 FL    NEUTROPHILS 38 (L) 42 - 75 %    BAND NEUTROPHILS 1 0 - 5 %    LYMPHOCYTES 49 20 - 51 %    MONOCYTES 10 (H) 2 - 9 %    EOSINOPHILS 1 0 - 5 %    BASOPHILS 1 0 - 3 %    ABS. NEUTROPHILS 0.9 (L) 1.8 - 8.0 K/UL    ABS. LYMPHOCYTES 1.3 0.8 - 3.5 K/UL    ABS. MONOCYTES 0.2 0 - 1.0 K/UL    ABS. EOSINOPHILS 0.0 0.0 - 0.4 K/UL    ABS. BASOPHILS 0.0 0.0 - 0.06 K/UL    DF MANUAL      PLATELET COMMENTS ADEQUATE PLATELETS      RBC COMMENTS NORMOCYTIC, NORMOCHROMIC     METABOLIC PANEL, COMPREHENSIVE   Result Value Ref Range    Sodium 141 136 - 145 mmol/L    Potassium 4.4 3.5 - 5.5 mmol/L    Chloride 106 100 - 111 mmol/L    CO2 27 21 - 32 mmol/L    Anion gap 8 3.0 - 18 mmol/L    Glucose 100 (H) 74 - 99 mg/dL    BUN 16 7.0 - 18 MG/DL    Creatinine 1.28 0.6 - 1.3 MG/DL    BUN/Creatinine ratio 13 12 - 20      GFR est AA >60 >60 ml/min/1.73m2    GFR est non-AA 55 (L) >60 ml/min/1.73m2    Calcium 9.2 8.5 - 10.1 MG/DL    Bilirubin, total 0.4 0.2 - 1.0 MG/DL    ALT (SGPT) 82 (H) 16 - 61 U/L    AST (SGOT) 67 (H) 10 - 38 U/L    Alk. phosphatase 75 45 - 117 U/L    Protein, total 7.9 6.4 - 8.2 g/dL    Albumin 3.8 3.4 - 5.0 g/dL    Globulin 4.1 (H) 2.0 - 4.0 g/dL    A-G Ratio 0.9 0.8 - 1.7     T PALLIDUM AB   Result Value Ref Range    T. pallidum interpretation.  NONREACTIVE NR     VITAMIN B12 & FOLATE   Result Value Ref Range    Vitamin B12 606 211 - 911 pg/mL    Folate 12.5 3.10 - 17.50 ng/mL   TSH W/ REFLX FREE T4 IF ABNORMAL   Result Value Ref Range    TSH 0.36 0.36 - 3.74 uIU/mL   HIV 1/2 AG/AB, 4TH GENERATION,W RFLX CONFIRM   Result Value Ref Range    HIV 1/2 Interpretation NONREACTIVE NR      HIV 1/2 result comment SEE NOTE         Prior to Admission medications    Medication Sig Start Date End Date Taking? Authorizing Provider   traZODone (DESYREL) 50 mg tablet Take 1 Tab by mouth nightly. 10/6/20  Yes Anamaria Sykes MD   cycloSPORINE (RESTASIS) 0.05 % dpet Administer 1 Drop to both eyes every twelve (12) hours. Yes Provider, Historical   montelukast (SINGULAIR) 10 mg tablet Take 1 Tab by mouth daily. 4/23/19  Yes Anamaria Sykes MD   fluticasone-salmeterol (ADVAIR DISKUS) 100-50 mcg/dose diskus inhaler Take 1 Puff by inhalation every twelve (12) hours. Yes Provider, Historical   omalizumab Kaylyn Elms) 150 mg solr by SubCUTAneous route every fourteen (14) days. Yes Provider, Historical   cetirizine-psuedoePHEDrine (ZYRTEC-D) 5-120 mg per tablet Take 1 Tab by mouth daily. 9/1/17  Yes Anamaria Sykes MD   Omeprazole delayed release (PRILOSEC D/R) 20 mg tablet Take 1 Tab by mouth daily. Patient taking differently: Take 20 mg by mouth as needed. 9/1/17  Yes Anamaria Sykes MD   levalbuterol tartrate Lidia Nenita) 45 mcg/actuation inhaler Take 2 Puffs by inhalation every four (4) hours as needed for Wheezing. 1/31/17  Yes Anamaria Sykes MD   FA/MV-MN/MIN AA JANETT/SAW PAL (SAW PALMETO-MULTIVIT-MIN-AA-FA PO) Take 1 Tab by mouth daily. Yes Provider, Historical   Cholecalciferol, Vitamin D3, (VITAMIN D) 2,000 unit Cap Take 1 Cap by mouth daily. 1/19/11  Yes Adair Zambrano MD   cefTRIAXone (ROCEPHIN) 1 gram injection 1 g by IntraMUSCular route once for 1 dose. 11/16/20 11/16/20  Mati Hdez MD   epinephrine (EPIPEN 2-LUZ MARIA INJECTION)  8/26/19   Provider, Historical   levalbuterol (XOPENEX) 1.25 mg/3 mL nebu 3 mL by Nebulization route every six (6) hours as needed. 7/29/16   Anamaria Sykes MD   aspirin 81 mg chewable tablet Take 1 Tab by mouth daily. 3/3/16   Anamaria Sykes MD   albuterol (PROVENTIL VENTOLIN) 2.5 mg /3 mL (0.083 %) nebulizer solution 3 mL by Nebulization route every four (4) hours as needed for Wheezing.  4/1/15   Anamaria Sykes MD     {History SmartLink choices - disappears if left unselected (Optional):83444        ROS    No flowsheet data found. Abdirahman Sellers, who was evaluated through a patient-initiated, synchronous (real-time) audio only encounter, and/or her healthcare decision maker, is aware that it is a billable service, with coverage as determined by his insurance carrier. He provided verbal consent to proceed: Yes. He has not had a related appointment within my department in the past 7 days or scheduled within the next 24 hours.       Total Time: minutes: 11-20 minutes    Sonia Rice MD

## 2020-11-18 DIAGNOSIS — J45.50 SEVERE PERSISTENT ASTHMA WITHOUT COMPLICATION: ICD-10-CM

## 2020-12-02 ENCOUNTER — HOSPITAL ENCOUNTER (OUTPATIENT)
Dept: INFUSION THERAPY | Age: 70
Discharge: HOME | End: 2020-12-02
Payer: MEDICARE

## 2020-12-02 VITALS
RESPIRATION RATE: 16 BRPM | DIASTOLIC BLOOD PRESSURE: 82 MMHG | TEMPERATURE: 97.2 F | OXYGEN SATURATION: 98 % | SYSTOLIC BLOOD PRESSURE: 122 MMHG | HEART RATE: 74 BPM

## 2020-12-02 DIAGNOSIS — J45.50 SEVERE PERSISTENT ASTHMA WITHOUT COMPLICATION: Primary | ICD-10-CM

## 2020-12-02 DIAGNOSIS — J45.50 SEVERE PERSISTENT ASTHMA WITHOUT COMPLICATION: ICD-10-CM

## 2020-12-02 PROCEDURE — 96372 THER/PROPH/DIAG INJ SC/IM: CPT

## 2020-12-02 PROCEDURE — 74011250636 HC RX REV CODE- 250/636: Performed by: INTERNAL MEDICINE

## 2020-12-02 RX ADMIN — OMALIZUMAB 150 MG: 150 INJECTION, SOLUTION SUBCUTANEOUS at 14:11

## 2020-12-02 RX ADMIN — OMALIZUMAB 150 MG: 150 INJECTION, SOLUTION SUBCUTANEOUS at 14:10

## 2020-12-02 NOTE — PROGRESS NOTES
[facility] Short Consult Note        Date: 2020    Name: Morales Hawkins    MRN: [id number]         : 1950    Treatment: Debara Rummer Injection      Mr. Orlin Goldmann was assessed and education was provided. Denies any reaction from last injection. Mr. Az Guillen vitals were reviewed and patient was observed for 5 minutes prior to treatment. Visit Vitals  /82 (BP 1 Location: Left arm, BP Patient Position: Sitting)   Pulse 74   Temp 97.2 °F (36.2 °C)   Resp 16   SpO2 98%       Xolair 300 mg SQ given in two equally divided injections:      Xolair 150 mg SQ given in patient's right upper arm and band aid applied.      Xolair 150 mg SQ given in patient's left upper arm and band aid applied to site.      Mr. Rey tolerated injections, and had no complaints at this time. Mr. Orlin Goldmann was discharged from [place] [address] in stable condition at 1415. He is to return on 2020 at 1030 for his next appointment for Xolair. Armband removed and shredded .     Grace Cesar RN  2020

## 2020-12-16 ENCOUNTER — HOSPITAL ENCOUNTER (OUTPATIENT)
Dept: INFUSION THERAPY | Age: 70
Discharge: HOME | End: 2020-12-16
Payer: MEDICARE

## 2020-12-16 VITALS
TEMPERATURE: 96 F | OXYGEN SATURATION: 98 % | DIASTOLIC BLOOD PRESSURE: 85 MMHG | HEART RATE: 88 BPM | RESPIRATION RATE: 16 BRPM | SYSTOLIC BLOOD PRESSURE: 153 MMHG

## 2020-12-16 DIAGNOSIS — J45.50 SEVERE PERSISTENT ASTHMA WITHOUT COMPLICATION: Primary | ICD-10-CM

## 2020-12-16 DIAGNOSIS — J45.50 SEVERE PERSISTENT ASTHMA WITHOUT COMPLICATION: ICD-10-CM

## 2020-12-16 PROCEDURE — 96372 THER/PROPH/DIAG INJ SC/IM: CPT

## 2020-12-16 PROCEDURE — 74011250636 HC RX REV CODE- 250/636: Performed by: INTERNAL MEDICINE

## 2020-12-16 RX ADMIN — OMALIZUMAB 150 MG: 150 INJECTION, SOLUTION SUBCUTANEOUS at 09:27

## 2020-12-16 RX ADMIN — OMALIZUMAB 150 MG: 150 INJECTION, SOLUTION SUBCUTANEOUS at 09:29

## 2020-12-16 NOTE — PROGRESS NOTES
[facility] Short Consult Note        Date: 2020    Name: Jeffrey Dobson    MRN: [id number]         : 1950    Treatment: Sydna Punch Injection      Mr. Yasmin Keller was assessed and education was provided. Denies any reaction from last injection. Mr. Anika Katz vitals were reviewed and patient was observed for 5 minutes prior to treatment. Visit Vitals  BP (!) 153/85 (BP 1 Location: Left arm)   Pulse 88   Temp (!) 96 °F (35.6 °C)   Resp 16   SpO2 98%       Xolair 300 mg SQ given in two equally divided injections:      Xolair 150 mg SQ given in patient's right upper arm and band aid applied.      Xolair 150 mg SQ given in patient's left upper arm and band aid applied to site.      Mr. Rey tolerated injections, and had no complaints at this time. Mr. Yasmin Keller was discharged from [place] [address] in stable condition at 0930  He is to return on 2020 at 80 for his next appointment for Sydna Punch. Armband removed and shredded .     Chris Bauer RN  2020

## 2020-12-30 ENCOUNTER — HOSPITAL ENCOUNTER (OUTPATIENT)
Dept: INFUSION THERAPY | Age: 70
Discharge: HOME | End: 2020-12-30
Payer: MEDICARE

## 2020-12-30 VITALS
SYSTOLIC BLOOD PRESSURE: 141 MMHG | RESPIRATION RATE: 16 BRPM | DIASTOLIC BLOOD PRESSURE: 88 MMHG | OXYGEN SATURATION: 100 % | HEART RATE: 97 BPM | TEMPERATURE: 96.4 F

## 2020-12-30 DIAGNOSIS — J45.50 SEVERE PERSISTENT ASTHMA WITHOUT COMPLICATION: Primary | ICD-10-CM

## 2020-12-30 DIAGNOSIS — J45.50 SEVERE PERSISTENT ASTHMA WITHOUT COMPLICATION: ICD-10-CM

## 2020-12-30 PROCEDURE — 96372 THER/PROPH/DIAG INJ SC/IM: CPT

## 2020-12-30 PROCEDURE — 74011250636 HC RX REV CODE- 250/636: Performed by: INTERNAL MEDICINE

## 2020-12-30 RX ADMIN — OMALIZUMAB 150 MG: 150 INJECTION, SOLUTION SUBCUTANEOUS at 09:38

## 2020-12-30 NOTE — PROGRESS NOTES
[facility] Short Consult Note        Date: 2020    Name: Cindi Ramsey    MRN: [id number]         : 1950    Treatment: Xolair Injection    Pt arrived ambulatory @ 409 for todays Xolair appointment. Mr. Noemy Abraham was assessed and education was provided. Denies any reaction from last injection. Mr. Mayra Schaeffer vitals were reviewed and patient was observed for 5 minutes prior to treatment. Visit Vitals  BP (!) 141/88 (BP 1 Location: Left arm)   Pulse 97   Temp (!) 96.4 °F (35.8 °C)   Resp 16   SpO2 100%       Xolair 300 mg SQ given in two equally divided injections:      Xolair 150 mg SQ given in patient's right upper arm and band aid applied.      Xolair 150 mg SQ given in patient's left upper arm and band aid applied to site.      Mr. Rey tolerated injections, and had no complaints at this time. Mr. Noemy Abraham was discharged from [place] [address] in stable condition at [address]  He is to return on 2021 at 80 for his next appointment for [address] [place] [address]. Armband removed and shredded .     Renata Gilliland RN  2020

## 2021-01-13 ENCOUNTER — HOSPITAL ENCOUNTER (OUTPATIENT)
Dept: INFUSION THERAPY | Age: 71
Discharge: HOME | End: 2021-01-13
Payer: MEDICARE

## 2021-01-13 VITALS
TEMPERATURE: 97 F | RESPIRATION RATE: 16 BRPM | OXYGEN SATURATION: 98 % | SYSTOLIC BLOOD PRESSURE: 124 MMHG | DIASTOLIC BLOOD PRESSURE: 76 MMHG | HEART RATE: 88 BPM

## 2021-01-13 DIAGNOSIS — J45.50 SEVERE PERSISTENT ASTHMA WITHOUT COMPLICATION: ICD-10-CM

## 2021-01-13 DIAGNOSIS — J45.50 SEVERE PERSISTENT ASTHMA WITHOUT COMPLICATION: Primary | ICD-10-CM

## 2021-01-13 PROCEDURE — 74011250636 HC RX REV CODE- 250/636: Performed by: INTERNAL MEDICINE

## 2021-01-13 PROCEDURE — 96372 THER/PROPH/DIAG INJ SC/IM: CPT

## 2021-01-13 RX ADMIN — OMALIZUMAB 150 MG: 150 INJECTION, SOLUTION SUBCUTANEOUS at 09:40

## 2021-01-27 ENCOUNTER — HOSPITAL ENCOUNTER (OUTPATIENT)
Dept: INFUSION THERAPY | Age: 71
End: 2021-01-27

## 2021-01-27 DIAGNOSIS — J45.50 SEVERE PERSISTENT ASTHMA WITHOUT COMPLICATION: ICD-10-CM

## 2021-02-02 ENCOUNTER — TELEPHONE (OUTPATIENT)
Dept: FAMILY MEDICINE CLINIC | Age: 71
End: 2021-02-02

## 2021-02-02 NOTE — TELEPHONE ENCOUNTER
No pre-op eval is required by surgeon only a form for patient to stop his anticoagulant treatment prior to the procedure. Form has been placed in Dr. Swartz [place] office.

## 2021-02-02 NOTE — TELEPHONE ENCOUNTER
Received a form from [address] for patient to have Lumbar Transforaminal Epidural with local anesthesia scheduled on 2/19/21. Per Kenneth Zambrano no pre-op exam is required for this procedure. Does patient need an appt. Please advise.

## 2021-02-03 ENCOUNTER — HOSPITAL ENCOUNTER (OUTPATIENT)
Dept: INFUSION THERAPY | Age: 71
Discharge: HOME | End: 2021-02-03
Payer: MEDICARE

## 2021-02-03 VITALS — HEART RATE: 85 BPM | OXYGEN SATURATION: 98 % | RESPIRATION RATE: 16 BRPM | TEMPERATURE: 97.1 F

## 2021-02-03 DIAGNOSIS — J45.50 SEVERE PERSISTENT ASTHMA WITHOUT COMPLICATION: Primary | ICD-10-CM

## 2021-02-03 PROCEDURE — 74011250636 HC RX REV CODE- 250/636: Performed by: INTERNAL MEDICINE

## 2021-02-03 PROCEDURE — 96372 THER/PROPH/DIAG INJ SC/IM: CPT

## 2021-02-03 RX ADMIN — OMALIZUMAB 150 MG: 150 INJECTION, SOLUTION SUBCUTANEOUS at 09:37

## 2021-02-03 RX ADMIN — OMALIZUMAB 150 MG: 150 INJECTION, SOLUTION SUBCUTANEOUS at 09:36

## 2021-02-03 NOTE — PROGRESS NOTES
[facility] OPIC Short Consult Note        Date: February 3, 2021    Name: Ngoc Rhoades    MRN: [id number]         : 1950    Treatment: Xolair Injection    Pt arrived ambulatory @ 0930 for todays Xolair appointment. Mr. Chen Munoz was assessed and education was provided. Denies any reaction from last injection. Mr. Nahid Reyes vitals were reviewed and patient was observed for 5 minutes prior to treatment. Visit Vitals  Pulse 85   Temp 97.1 °F (36.2 °C)   Resp 16   SpO2 98%       Xolair 300 mg SQ given in two equally divided injections:      Xolair 150 mg SQ given in patient's right upper arm and band aid applied.      Xolair 150 mg SQ given in patient's left upper arm and band aid applied to site.      Mr. Rey tolerated injections, and had no complaints at this time. Mr. Chen Munoz was discharged from [place] [address] in stable condition at [address]  He is to return on 2021 at 0930 for his next appointment for [address] [place] [address]. Armband removed and shredded .     Pati Oppenheim, RN  February 3, 2021

## 2021-02-10 ENCOUNTER — HOSPITAL ENCOUNTER (OUTPATIENT)
Dept: INFUSION THERAPY | Age: 71
End: 2021-02-10

## 2021-02-10 DIAGNOSIS — J45.50 SEVERE PERSISTENT ASTHMA WITHOUT COMPLICATION: ICD-10-CM

## 2021-02-17 ENCOUNTER — HOSPITAL ENCOUNTER (OUTPATIENT)
Dept: INFUSION THERAPY | Age: 71
Discharge: HOME | End: 2021-02-17
Payer: MEDICARE

## 2021-02-17 VITALS
SYSTOLIC BLOOD PRESSURE: 116 MMHG | RESPIRATION RATE: 16 BRPM | DIASTOLIC BLOOD PRESSURE: 85 MMHG | HEART RATE: 97 BPM | OXYGEN SATURATION: 98 % | TEMPERATURE: 95 F

## 2021-02-17 DIAGNOSIS — J45.50 SEVERE PERSISTENT ASTHMA WITHOUT COMPLICATION: Primary | ICD-10-CM

## 2021-02-17 PROCEDURE — 96372 THER/PROPH/DIAG INJ SC/IM: CPT

## 2021-02-17 PROCEDURE — 74011250636 HC RX REV CODE- 250/636: Performed by: INTERNAL MEDICINE

## 2021-02-17 RX ADMIN — OMALIZUMAB 150 MG: 150 INJECTION, SOLUTION SUBCUTANEOUS at 09:37

## 2021-02-17 NOTE — PROGRESS NOTES
[facility] Short Consult Note        Date: 2021    Name: Omar Akers    MRN: [id number]         : 1950    Treatment: Xolair Injection    Pt arrived ambulatory @ 0930 for todays Xolair appointment. Mr. Agnieszka Gonzales was assessed and education was provided. Denies any reaction from last injection. Mr. Bernadette Kirk vitals were reviewed and patient was observed for 5 minutes prior to treatment. Visit Vitals  /85 (BP 1 Location: Left arm, BP Patient Position: Sitting)   Pulse 97   Temp (!) 95 °F (35 °C)   Resp 16   SpO2 98%       Xolair 300 mg SQ given in two equally divided injections:      Xolair 150 mg SQ given in patient's right abd and band aid applied.      Xolair 150 mg SQ given in patient's left abd and band aid applied to site.      Mr. Rey tolerated injections, and had no complaints at this time. Mr. Agnieszka Gonzales was discharged from [place] [address] in stable condition at [address]  He is to return on 2021 at 0930 for his next appointment for [address] [place], [place] [address]quy[address] [place]. Armband removed and shredded .     Veronica Mathur RN  2021

## 2021-02-24 ENCOUNTER — APPOINTMENT (OUTPATIENT)
Dept: INFUSION THERAPY | Age: 71
End: 2021-02-24
Payer: MEDICARE

## 2021-02-24 DIAGNOSIS — J45.50 SEVERE PERSISTENT ASTHMA WITHOUT COMPLICATION: ICD-10-CM

## 2021-03-03 ENCOUNTER — HOSPITAL ENCOUNTER (OUTPATIENT)
Dept: INFUSION THERAPY | Age: 71
Discharge: HOME | End: 2021-03-03
Payer: MEDICARE

## 2021-03-03 VITALS
OXYGEN SATURATION: 98 % | TEMPERATURE: 96.3 F | HEART RATE: 97 BPM | SYSTOLIC BLOOD PRESSURE: 122 MMHG | DIASTOLIC BLOOD PRESSURE: 88 MMHG | RESPIRATION RATE: 16 BRPM

## 2021-03-03 DIAGNOSIS — J45.50 SEVERE PERSISTENT ASTHMA WITHOUT COMPLICATION: Primary | ICD-10-CM

## 2021-03-03 PROCEDURE — 96372 THER/PROPH/DIAG INJ SC/IM: CPT

## 2021-03-03 PROCEDURE — 74011250636 HC RX REV CODE- 250/636: Performed by: INTERNAL MEDICINE

## 2021-03-03 RX ADMIN — OMALIZUMAB 150 MG: 150 INJECTION, SOLUTION SUBCUTANEOUS at 09:34

## 2021-03-03 NOTE — PROGRESS NOTES
[facility] Short Consult Note        Date: March 3, 2021    Name: Malgorzata Ramos    MRN: [id number]         : 1950    Treatment: Xolair Injection    Pt arrived ambulatory @ 0930 for todays Xolair appointment. Mr. Marie Michaels was assessed and education was provided. Denies any reaction from last injection. Mr. Tomi Jones vitals were reviewed and patient was observed for 5 minutes prior to treatment. Visit Vitals  /88 (BP 1 Location: Left arm)   Pulse 97   Temp (!) 96.3 °F (35.7 °C)   Resp 16   SpO2 98%       Xolair 300 mg SQ given by Jonas Lockhart RN in two equally divided injections:      Xolair 150 mg SQ given in patient's right abd and band aid applied.      Xolair 150 mg SQ given in patient's left abd and band aid applied to site.      Mr. Rey tolerated injections, and had no complaints at this time. Mr. Marie Michaels was discharged from [place] [address] in stable condition at [address]  He is to return on 2021 at 0930 for his next appointment for [address] [place] [address]. Armband removed and shredded .     Maged Ndiaye RN  March 3, 2021

## 2021-03-10 ENCOUNTER — APPOINTMENT (OUTPATIENT)
Dept: INFUSION THERAPY | Age: 71
End: 2021-03-10
Payer: MEDICARE

## 2021-03-10 DIAGNOSIS — J45.50 SEVERE PERSISTENT ASTHMA WITHOUT COMPLICATION: ICD-10-CM

## 2021-03-17 ENCOUNTER — HOSPITAL ENCOUNTER (OUTPATIENT)
Dept: INFUSION THERAPY | Age: 71
End: 2021-03-17

## 2021-03-19 ENCOUNTER — HOSPITAL ENCOUNTER (OUTPATIENT)
Dept: INFUSION THERAPY | Age: 71
Discharge: HOME | End: 2021-03-19
Payer: MEDICARE

## 2021-03-19 VITALS
DIASTOLIC BLOOD PRESSURE: 71 MMHG | HEART RATE: 98 BPM | TEMPERATURE: 97.1 F | RESPIRATION RATE: 16 BRPM | OXYGEN SATURATION: 98 % | SYSTOLIC BLOOD PRESSURE: 116 MMHG

## 2021-03-19 DIAGNOSIS — J45.50 SEVERE PERSISTENT ASTHMA WITHOUT COMPLICATION: Primary | ICD-10-CM

## 2021-03-19 PROCEDURE — 96372 THER/PROPH/DIAG INJ SC/IM: CPT

## 2021-03-19 PROCEDURE — 74011250636 HC RX REV CODE- 250/636: Performed by: INTERNAL MEDICINE

## 2021-03-19 RX ADMIN — OMALIZUMAB 150 MG: 150 INJECTION, SOLUTION SUBCUTANEOUS at 09:45

## 2021-03-19 RX ADMIN — OMALIZUMAB 150 MG: 150 INJECTION, SOLUTION SUBCUTANEOUS at 09:43

## 2021-03-19 NOTE — PROGRESS NOTES
[facility] Short Consult Note        Date: 2021    Name: Desire Myles    MRN: [id number]         : 1950    Treatment: Xolair Injection    Pt arrived ambulatory @ 688 for todays Xolair appointment. Mr. Karmen Olivares was assessed and education was provided. Denies any reaction from last injection. Mr. Dwayne Lundborg vitals were reviewed and patient was observed for 5 minutes prior to treatment. Visit Vitals  /71   Pulse 98   Temp 97.1 °F (36.2 °C)   Resp 16   SpO2 98%       Xolair 300 mg SQ given in two equally divided injections:      Xolair 150 mg SQ given in patient's right upper arm and band aid applied.      Xolair 150 mg SQ given in patient's left upper arm and band aid applied to site.      Mr. Rey tolerated injections, and had no complaints at this time. Mr. Karmen Olivares was discharged from [place] [address] in stable condition at   He is to return on 2021 at 80 for his next appointment for [facility] [address] Armband removed and shredded .     Felecia Lombardi RN  2021

## 2021-03-24 ENCOUNTER — APPOINTMENT (OUTPATIENT)
Dept: INFUSION THERAPY | Age: 71
End: 2021-03-24
Payer: MEDICARE

## 2021-03-24 DIAGNOSIS — J45.50 SEVERE PERSISTENT ASTHMA WITHOUT COMPLICATION: ICD-10-CM

## 2021-03-26 ENCOUNTER — VIRTUAL VISIT (OUTPATIENT)
Dept: FAMILY MEDICINE CLINIC | Age: 71
End: 2021-03-26
Payer: MEDICARE

## 2021-03-26 DIAGNOSIS — C61 PROSTATE CANCER (HCC): ICD-10-CM

## 2021-03-26 DIAGNOSIS — R68.89 BODY TEMPERATURE LOW: Primary | ICD-10-CM

## 2021-03-26 PROCEDURE — G8756 NO BP MEASURE DOC: HCPCS | Performed by: FAMILY MEDICINE

## 2021-03-26 PROCEDURE — 1101F PT FALLS ASSESS-DOCD LE1/YR: CPT | Performed by: FAMILY MEDICINE

## 2021-03-26 PROCEDURE — 3017F COLORECTAL CA SCREEN DOC REV: CPT | Performed by: FAMILY MEDICINE

## 2021-03-26 PROCEDURE — 99213 OFFICE O/P EST LOW 20 MIN: CPT | Performed by: FAMILY MEDICINE

## 2021-03-26 PROCEDURE — G8428 CUR MEDS NOT DOCUMENT: HCPCS | Performed by: FAMILY MEDICINE

## 2021-03-26 PROCEDURE — G9717 DOC PT DX DEP/BP F/U NT REQ: HCPCS | Performed by: FAMILY MEDICINE

## 2021-03-26 NOTE — PROGRESS NOTES
Lucy James is a 69 y.o. male, established patient, who was seen by synchronous (real-time) audio-video technology on 3/26/2021 for Thyroid Problem        Assessment & Plan:   1. Body temperature low  -     TSH W/ REFLX FREE T4 IF ABNORMAL; Future  2. Prostate cancer ([facility] [address]     I suspect the low temps represent a sampling error, but recheck is reasonable. Praised for adoption of vegan diet. [facility] Reschedule colo  Scheduled for COVID vaccine  Follow up as needed    712  Subjective:   Hiccups - lasted 7-10 days. Resolved with an episode of emesis. diagnosed prostate cancer Nov. Seeing DR. Charles q3 months. Watchful waiting     Changed to vegan diet has lost 20 lbs     Recent finished [facility]. Axillary and oral temps varied from 96.1-97.8. Advised by physician on campus to have thyroid checked. Dr Joe Montejo. Renea Duos \"fine\" aside from urinary hesitancy. MRI tomorrow      Lab Results   Component Value Date/Time    TSH 0.36 11/13/2020 03:33 PM          Prior to Admission medications    Medication Sig Start Date End Date Taking? Authorizing Provider   cycloSPORINE (RESTASIS) 0.05 % dpet Administer 1 Drop to both eyes every twelve (12) hours. Yes Provider, Historical   montelukast (SINGULAIR) 10 mg tablet Take 1 Tab by mouth daily. 4/23/19  Yes Jordi Hutton MD   fluticasone-salmeterol (ADVAIR DISKUS) 100-50 mcg/dose diskus inhaler Take 1 Puff by inhalation every twelve (12) hours. Yes Provider, Historical   omalizumab Il Kirkhijessica) 150 mg solr by SubCUTAneous route every fourteen (14) days. Yes Provider, Historical   cetirizine-psuedoePHEDrine (ZYRTEC-D) 5-120 mg per tablet Take 1 Tab by mouth daily. 9/1/17  Yes Jordi Hutton MD   Omeprazole delayed release (PRILOSEC D/R) 20 mg tablet Take 1 Tab by mouth daily. Patient taking differently: Take 20 mg by mouth as needed.  9/1/17  Yes Jordi Hutton MD   aspirin 81 mg chewable tablet Take 1 Tab by mouth daily. 3/3/16  Yes Jocelyn Matt MD   FA/MV-MN/MIN AA JANETT/SAW PAL (SAW PALMETO-MULTIVIT-MIN-AA-FA PO) Take 1 Tab by mouth daily. Yes Provider, Historical   Cholecalciferol, Vitamin D3, (VITAMIN D) 2,000 unit Cap Take 1 Cap by mouth daily. 1/19/11  Yes Fernando Sewell MD   traZODone (DESYREL) 100 mg tablet Take 0.5 Tabs by mouth nightly. 11/17/20 3/26/21  Jocelyn Matt MD   epinephrine (EPIPEN 2-LUZ MARIA INJECTION)  8/26/19   Provider, Historical   levalbuterol tartrate (XOPENEX) 45 mcg/actuation inhaler Take 2 Puffs by inhalation every four (4) hours as needed for Wheezing. 1/31/17   Jocelyn Matt MD   levalbuterol (XOPENEX) 1.25 mg/3 mL nebu 3 mL by Nebulization route every six (6) hours as needed. 7/29/16   Jocelyn Matt MD   albuterol (PROVENTIL VENTOLIN) 2.5 mg /3 mL (0.083 %) nebulizer solution 3 mL by Nebulization route every four (4) hours as needed for Wheezing. 4/1/15   Jocelyn Matt MD         Objective:     BP Readings from Last 3 Encounters:   03/19/21 116/71   03/03/21 122/88   02/17/21 116/85      No flowsheet data found. General: alert, cooperative, no distress   Mental  status: normal mood, behavior, speech, dress, motor activity, and thought processes, able to follow commands   HENT: NCAT   Neck: no visualized mass   Resp: no respiratory distress   Neuro: no gross deficits   Skin: no discoloration or lesions of concern on visible areas   Psychiatric: normal affect, consistent with stated mood, no evidence of hallucinations     Additional exam findings: We discussed the expected course, resolution and complications of the diagnosis(es) in detail. Medication risks, benefits, costs, interactions, and alternatives were discussed as indicated. I advised him to contact the office if his condition worsens, changes or fails to improve as anticipated. He expressed understanding with the diagnosis(es) and plan.        Omar Akers was evaluated through a patient-initiated, synchronous (real-time) audio-video encounter. The family is aware that it is a billable service. Verbal consent to proceed has been obtained within the past 12 months. It was conducted pursuant to the emergency declaration under the [address] [place] [address] authority and the [employer] General Act. Patient identification was verified, and a caregiver was present when appropriate. I was at home or in the office. The patient was at home.       Francisco Be MD

## 2021-03-26 NOTE — PROGRESS NOTES
Patient says he had set this appt to discuss hiccups that has since resolved. He says he would like to discuss his thyroid today instead, was told at a wellness retreat he should be concerned about his thyroid. He also says he has not talked with Dr. Aida Yu since being diagnosed with prostate cancer. 1. Have you been to the ER, urgent care clinic since your last visit? Hospitalized since your last visit? No  2. Have you seen or consulted any other health care providers outside of the [address] [place] [address] since your last visit? Include any pap smears or colon screening. Yes When: 3/17/21 Where: [facility] Reason for visit: wellness retreat    Medication reconciliation has been completed with patient. Care team discussed/updated as well as pharmacy.     Health Maintenance Due   Topic Date Due    COVID-19 Vaccine (1) Never done    Shingrix Vaccine Age 50> (1 of 2) Never done    Colorectal Cancer Screening Combo  06/18/2020    Flu Vaccine (1) 09/01/2020    DTaP/Tdap/Td series (2 - Td) 09/09/2020    Lipid Screen  02/28/2021

## 2021-03-26 NOTE — PATIENT INSTRUCTIONS
The [employer] [facility] [facility] is a nonprofit organization that promotes preventive medicine, conducts clinical research, and encourages higher standards for ethics and effectiveness in research. [address] [employer] has provided vital information to tens of thousands of people. [facility] has led the way for reforms of federal nutrition policies. Their website offers many resources including cookbooks to assist people wishing to adopt healthier lifestyles to prevent or reverse many chronic conditions such as diabetes, bone loss or cancer. To request a free copy of the Vegetarian Starter Kit, contact [facility] at , ext. 306.  Their website is [email or web address].

## 2021-03-31 ENCOUNTER — HOSPITAL ENCOUNTER (OUTPATIENT)
Dept: LAB | Age: 71
Discharge: HOME | End: 2021-03-31
Payer: MEDICARE

## 2021-03-31 ENCOUNTER — APPOINTMENT (OUTPATIENT)
Dept: INFUSION THERAPY | Age: 71
End: 2021-03-31
Payer: MEDICARE

## 2021-03-31 DIAGNOSIS — R97.20 ELEVATED PSA, LESS THAN 10 NG/ML: ICD-10-CM

## 2021-03-31 DIAGNOSIS — R68.89 BODY TEMPERATURE LOW: ICD-10-CM

## 2021-03-31 LAB — TSH SERPL-ACNC: 0.52 UIU/ML (ref 0.36–3.74)

## 2021-03-31 PROCEDURE — 84443 ASSAY THYROID STIM HORMONE: CPT

## 2021-03-31 PROCEDURE — 36415 COLL VENOUS BLD VENIPUNCTURE: CPT

## 2021-03-31 PROCEDURE — 84154 ASSAY OF PSA FREE: CPT

## 2021-04-01 LAB
PSA FREE MFR SERPL: 11.8 %
PSA FREE SERPL-MCNC: 0.85 NG/ML
PSA SERPL-MCNC: 7.2 NG/ML (ref 0–4)

## 2021-04-07 DIAGNOSIS — J45.50 SEVERE PERSISTENT ASTHMA WITHOUT COMPLICATION: ICD-10-CM

## 2021-04-09 ENCOUNTER — HOSPITAL ENCOUNTER (OUTPATIENT)
Dept: INFUSION THERAPY | Age: 71
Discharge: HOME | End: 2021-04-09
Payer: MEDICARE

## 2021-04-09 VITALS
DIASTOLIC BLOOD PRESSURE: 67 MMHG | HEART RATE: 98 BPM | SYSTOLIC BLOOD PRESSURE: 105 MMHG | OXYGEN SATURATION: 99 % | RESPIRATION RATE: 16 BRPM | TEMPERATURE: 96.9 F

## 2021-04-09 DIAGNOSIS — J45.50 SEVERE PERSISTENT ASTHMA WITHOUT COMPLICATION: Primary | ICD-10-CM

## 2021-04-09 PROCEDURE — 96372 THER/PROPH/DIAG INJ SC/IM: CPT

## 2021-04-09 PROCEDURE — 74011250636 HC RX REV CODE- 250/636: Performed by: INTERNAL MEDICINE

## 2021-04-09 RX ADMIN — OMALIZUMAB 150 MG: 150 INJECTION, SOLUTION SUBCUTANEOUS at 09:38

## 2021-04-09 NOTE — PROGRESS NOTES
[facility] Progress Note    Date: 2021    Name: Dominic Larsen              MRN: [id number]              : 1950    Xolair        Pt arrived ambulatory @ 80 for todays Xolair appointment. Mr. Sho Watkins was assessed and education was provided. Denies any reaction from last injection. Mr. Amaya Bustos vitals were reviewed. Visit Vitals  /67   Pulse 98   Temp 96.9 °F (36.1 °C)   Resp 16   SpO2 99%       Xolair 300 mg SQ given in two equally divided injections:      Xolair 150 mg SQ given in patient's right upper arm and band aid applied.      Xolair 150 mg SQ given in patient's left upper arm and band aid applied to site.      Mr. Rey tolerated injections, and had no complaints at this time.       Mr. Sho Watkins was discharged from [place] [address] in stable condition at [address] He is to return on 21 at 0930 for his next appointment. Armband removed and shredded.      Landon Platt RN  2021  9:50 AM

## 2021-04-14 ENCOUNTER — APPOINTMENT (OUTPATIENT)
Dept: INFUSION THERAPY | Age: 71
End: 2021-04-14
Payer: MEDICARE

## 2021-04-21 ENCOUNTER — HOSPITAL ENCOUNTER (OUTPATIENT)
Dept: INFUSION THERAPY | Age: 71
Discharge: HOME | End: 2021-04-21
Payer: MEDICARE

## 2021-04-21 VITALS
SYSTOLIC BLOOD PRESSURE: 119 MMHG | DIASTOLIC BLOOD PRESSURE: 67 MMHG | RESPIRATION RATE: 16 BRPM | OXYGEN SATURATION: 99 % | HEART RATE: 103 BPM | TEMPERATURE: 98 F

## 2021-04-21 DIAGNOSIS — J45.50 SEVERE PERSISTENT ASTHMA WITHOUT COMPLICATION: ICD-10-CM

## 2021-04-21 DIAGNOSIS — J45.50 SEVERE PERSISTENT ASTHMA WITHOUT COMPLICATION: Primary | ICD-10-CM

## 2021-04-21 PROCEDURE — 96372 THER/PROPH/DIAG INJ SC/IM: CPT

## 2021-04-21 PROCEDURE — 74011250636 HC RX REV CODE- 250/636: Performed by: INTERNAL MEDICINE

## 2021-04-21 RX ADMIN — OMALIZUMAB 150 MG: 150 INJECTION, SOLUTION SUBCUTANEOUS at 09:34

## 2021-04-21 NOTE — PROGRESS NOTES
[facility] Short Consult Note        Date: 2021    Name: Jigna Gonzáles    MRN: [id number]         : 1950    Treatment: Xolair Injection    Pt arrived ambulatory @ 0930 for todays Xolair appointment. Mr. Nahed Barba was assessed and education was provided. Denies any reaction from last injection. Mr. Jessica Paulino vitals were reviewed and patient was observed for 5 minutes prior to treatment. Visit Vitals  /67 (BP 1 Location: Left arm, BP Patient Position: Sitting)   Pulse (!) 103   Temp 98 °F (36.7 °C)   Resp 16   SpO2 99%       Xolair 300 mg SQ given by Day Alford RN in two equally divided injections:      Xolair 150 mg SQ given in patient's right arm and band aid applied.      Xolair 150 mg SQ given in patient's left arm and band aid applied to site.      Mr. Rey tolerated injections, and had no complaints at this time. Mr. Nahed Barba was discharged from [place] [address] in stable condition at [address]  He is to return on 2021 at 0930 for his next appointment for [address] [place] [address]. Armband removed and shredded .     Viviane Schulte RN  2021

## 2021-04-26 ENCOUNTER — TELEPHONE (OUTPATIENT)
Dept: FAMILY MEDICINE CLINIC | Age: 71
End: 2021-04-26

## 2021-04-26 DIAGNOSIS — J30.1 ALLERGIC RHINITIS DUE TO POLLEN, UNSPECIFIED RHINITIS SEASONALITY: ICD-10-CM

## 2021-04-26 DIAGNOSIS — J45.40 MODERATE PERSISTENT ASTHMA WITHOUT COMPLICATION: ICD-10-CM

## 2021-04-26 DIAGNOSIS — J30.1 ALLERGIC RHINITIS DUE TO POLLEN: ICD-10-CM

## 2021-04-26 RX ORDER — CETIRIZINE HYDROCHLORIDE, PSEUDOEPHEDRINE HYDROCHLORIDE 5; 120 MG/1; MG/1
1 TABLET, FILM COATED, EXTENDED RELEASE ORAL DAILY
Qty: 90 TAB | Refills: 4 | Status: SHIPPED | OUTPATIENT
Start: 2021-04-26

## 2021-04-26 RX ORDER — MONTELUKAST SODIUM 10 MG/1
10 TABLET ORAL DAILY
Qty: 90 TAB | Refills: 4 | Status: CANCELLED | OUTPATIENT
Start: 2021-04-26

## 2021-04-26 NOTE — TELEPHONE ENCOUNTER
Called [facility] to f/u on referral status on patient. They have received the referral request but have not reached out to patient for an appt as of yet.  They will call him when they get to his referral.

## 2021-04-26 NOTE — TELEPHONE ENCOUNTER
Knowing the referral issues, I signed off on the Singulair request on 4/23. Approving this request as well. Please investigate the status of his pulm access/allergy access.   MARE

## 2021-04-26 NOTE — TELEPHONE ENCOUNTER
Patient requesting refills on his Singulair and Claritin. He was asked to reach out to Pulmonary for refills no his Singulair since this is part of his Asthma treatment. He has not been seen here for asthma and allergies since 2017. Should he contact Pulmonary for refills on his Claritin-D as well or can he be scheduled for follow up on his allergies. Please advise.

## 2021-05-05 ENCOUNTER — HOSPITAL ENCOUNTER (OUTPATIENT)
Dept: INFUSION THERAPY | Age: 71
Discharge: HOME | End: 2021-05-05
Payer: MEDICARE

## 2021-05-05 VITALS
RESPIRATION RATE: 16 BRPM | SYSTOLIC BLOOD PRESSURE: 145 MMHG | OXYGEN SATURATION: 98 % | TEMPERATURE: 98.2 F | HEART RATE: 105 BPM | DIASTOLIC BLOOD PRESSURE: 85 MMHG

## 2021-05-05 DIAGNOSIS — J45.50 SEVERE PERSISTENT ASTHMA WITHOUT COMPLICATION: ICD-10-CM

## 2021-05-05 DIAGNOSIS — J45.50 SEVERE PERSISTENT ASTHMA WITHOUT COMPLICATION: Primary | ICD-10-CM

## 2021-05-05 PROCEDURE — 74011250636 HC RX REV CODE- 250/636: Performed by: INTERNAL MEDICINE

## 2021-05-05 PROCEDURE — 96372 THER/PROPH/DIAG INJ SC/IM: CPT

## 2021-05-05 RX ADMIN — OMALIZUMAB 150 MG: 150 INJECTION, SOLUTION SUBCUTANEOUS at 09:33

## 2021-05-05 NOTE — TELEPHONE ENCOUNTER
Patient called in to check status of referral. I gave him contact info for [facility]. He is calling them.

## 2021-05-05 NOTE — PROGRESS NOTES
[facility] Short Consult Note        Date: May 5, 2021    Name: Jarrell Worrell    MRN: [id number]         : 1950    Treatment: Xolair Injection    Pt arrived ambulatory @ 0930 for todays Xolair appointment. Mr. Evelin Smith was assessed and education was provided. Denies any reaction from last injection. Mr. Venegas Ards vitals were reviewed and patient was observed for 5 minutes prior to treatment. Visit Vitals  BP (!) 145/85 (BP 1 Location: Left arm, BP Patient Position: Sitting)   Pulse (!) 105   Temp 98.2 °F (36.8 °C)   Resp 16   SpO2 98%       Xolair 300 mg SQ given by Ruby Eaton RN in two equally divided injections:      Xolair 150 mg SQ given in patient's right arm and band aid applied.      Xolair 150 mg SQ given in patient's left arm and band aid applied to site.      Mr. Rey tolerated injections, and had no complaints at this time. Mr. Evelin Smith was discharged from [place] [address] in stable condition at [address]  He is to return after seeing new doctor. Armband removed and shredded .     Zoë Robin RN  May 5, 2021

## 2021-05-19 ENCOUNTER — HOSPITAL ENCOUNTER (OUTPATIENT)
Dept: INFUSION THERAPY | Age: 71
End: 2021-05-19

## 2021-05-20 ENCOUNTER — TELEPHONE (OUTPATIENT)
Dept: FAMILY MEDICINE CLINIC | Age: 71
End: 2021-05-20

## 2021-05-20 NOTE — TELEPHONE ENCOUNTER
Patient called requesting that his notes be faxed to [facility] in order for them to schedule him an appointment. Please review and advise and F/u with patient at [phone].

## 2021-05-26 DIAGNOSIS — J30.1 ALLERGIC RHINITIS DUE TO POLLEN: ICD-10-CM

## 2021-05-26 DIAGNOSIS — J45.40 MODERATE PERSISTENT ASTHMA WITHOUT COMPLICATION: ICD-10-CM

## 2021-05-26 RX ORDER — FLUTICASONE PROPIONATE AND SALMETEROL 100; 50 UG/1; UG/1
1 POWDER RESPIRATORY (INHALATION) EVERY 12 HOURS
Qty: 180 EACH | Refills: 0 | Status: SHIPPED | OUTPATIENT
Start: 2021-05-26 | End: 2021-06-08 | Stop reason: SDUPTHER

## 2021-05-26 RX ORDER — MONTELUKAST SODIUM 10 MG/1
10 TABLET ORAL DAILY
Qty: 90 TABLET | Refills: 4 | Status: SHIPPED | OUTPATIENT
Start: 2021-05-26 | End: 2021-06-08 | Stop reason: SDUPTHER

## 2021-05-26 NOTE — TELEPHONE ENCOUNTER
Patient sent a my chart message stating he does not have his Advair or his Singulair. Called [facility] where his referral/consult request was faxed spoke with Davon Gray who says she will pass patients information on the their  to contact patient. Can we send in medication for patient until he is able to get in with Pulmonary? Meds have been pended please review and sign if appropriate.

## 2021-05-26 NOTE — TELEPHONE ENCOUNTER
Called patient  verified he has been asked to schedule a f/u with Dr. Sukhwinder Keating for his asthma medication offered in office or VV on . Patient says he is not able to do an in office visit but has been scheduled for a VV on  at 3:45 for asthma follow up. He was also told I have reached out to [facility] and he should be getting a call this afternoon to be scheduled asked that he calls me back if he is not contacted. Patient has expressed understanding.

## 2021-05-27 ENCOUNTER — TELEPHONE (OUTPATIENT)
Dept: FAMILY MEDICINE CLINIC | Age: 71
End: 2021-05-27

## 2021-05-27 NOTE — TELEPHONE ENCOUNTER
Received fax from patients pharmacy requesting PA for [address], attempted to submit PA request via cover my meds response indicated brand name Advair is the preferred med. Notified patients pharmacy asked if they could run this as brand name to see if it is covered. This was covered by brand name however they do not have this in at the moment, will order and should have this by tomorrow. Called patient to make him aware no answer left message on patients voicemail asking that he call the office back.

## 2021-05-27 NOTE — TELEPHONE ENCOUNTER
Patient called the office back and has been notified about his Advair, he says he was not contacted by Pulmonary for an appt. Told I will call and to get him scheduled and will call him back this afternoon with an appt.

## 2021-05-28 ENCOUNTER — VIRTUAL VISIT (OUTPATIENT)
Dept: FAMILY MEDICINE CLINIC | Age: 71
End: 2021-05-28
Payer: MEDICARE

## 2021-05-28 DIAGNOSIS — J45.51: Primary | ICD-10-CM

## 2021-05-28 PROCEDURE — G8756 NO BP MEASURE DOC: HCPCS | Performed by: FAMILY MEDICINE

## 2021-05-28 PROCEDURE — G9717 DOC PT DX DEP/BP F/U NT REQ: HCPCS | Performed by: FAMILY MEDICINE

## 2021-05-28 PROCEDURE — 99214 OFFICE O/P EST MOD 30 MIN: CPT | Performed by: FAMILY MEDICINE

## 2021-05-28 PROCEDURE — G8420 CALC BMI NORM PARAMETERS: HCPCS | Performed by: FAMILY MEDICINE

## 2021-05-28 PROCEDURE — 3017F COLORECTAL CA SCREEN DOC REV: CPT | Performed by: FAMILY MEDICINE

## 2021-05-28 PROCEDURE — 1101F PT FALLS ASSESS-DOCD LE1/YR: CPT | Performed by: FAMILY MEDICINE

## 2021-05-28 PROCEDURE — G8427 DOCREV CUR MEDS BY ELIG CLIN: HCPCS | Performed by: FAMILY MEDICINE

## 2021-05-28 PROCEDURE — G8536 NO DOC ELDER MAL SCRN: HCPCS | Performed by: FAMILY MEDICINE

## 2021-05-28 RX ORDER — LEVALBUTEROL TARTRATE 45 UG/1
2 AEROSOL, METERED ORAL
Qty: 1 INHALER | Refills: 4 | Status: SHIPPED | OUTPATIENT
Start: 2021-05-28

## 2021-05-28 RX ORDER — METHYLPREDNISOLONE 4 MG/1
TABLET ORAL
Qty: 1 DOSE PACK | Refills: 0 | Status: SHIPPED | OUTPATIENT
Start: 2021-05-28 | End: 2021-07-20

## 2021-05-28 NOTE — Clinical Note
Please invite for [address] after he's plugged in with [facility] for his asthma. His BP was elevated most recently, which I suspect to have been related to his trouble breathing, and I'd like to recheck that same time. Perhaps early July?

## 2021-05-28 NOTE — PROGRESS NOTES
Scotty Alford is a 69 y.o. male, established patient, who was seen by synchronous (real-time) audio-video technology on 5/28/2021 for    Assessment & Plan:   1. Intrinsic asthma with acute exacerbation, severe persistent  Assessment & Plan:  Uncontrolled with ACT 14. Just resumed Advair. On track to reestablish access to [address] [place] [address], through [facility]. Continue Xopenex. Add short course oral steroids. Orders:  -     levalbuterol tartrate (XOPENEX) 45 mcg/actuation inhaler; Take 2 Puffs by inhalation every four (4) hours as needed for Wheezing., Normal, Disp-1 Inhaler, R-4  -     methylPREDNISolone (MEDROL DOSEPACK) 4 mg tablet; Use according to package instructions, Normal, Disp-1 Dose Pack, R-0          712  Subjective:     Called by [facility] today: CXR and PFTs planned - presumably to be followed by resumption of lapsed Xolair    Montelukast never lapsed  Advair lapsed for 2 weeks \"I felt it\", just resumed yesterday    ACT 14            Objective:     BP Readings from Last 3 Encounters:   05/05/21 (!) 145/85   04/21/21 119/67   04/09/21 105/67         General: alert, cooperative, no distress   Mental  status: normal mood, behavior, speech, dress, motor activity, and thought processes, able to follow commands   HENT: NCAT   Neck: no visualized mass   Resp: no respiratory distress   Neuro: no gross deficits   Skin: no discoloration or lesions of concern on visible areas   Psychiatric: normal affect, consistent with stated mood, no evidence of hallucinations     Additional exam findings: We discussed the expected course, resolution and complications of the diagnosis(es) in detail. Medication risks, benefits, costs, interactions, and alternatives were discussed as indicated. I advised him to contact the office if his condition worsens, changes or fails to improve as anticipated. He expressed understanding with the diagnosis(es) and plan.        Scotty Alford was evaluated through a patient-initiated, synchronous (real-time) audio-video encounter. The family is aware that it is a billable service. Verbal consent to proceed has been obtained within the past 12 months. It was conducted pursuant to the emergency declaration under the [address] [place] [address], [address] [place] [address] authority and the [employer] Act. Patient identification was verified, and a caregiver was present when appropriate. I was at home or in the office. The patient was at home.       Lucita Cruz MD

## 2021-05-28 NOTE — TELEPHONE ENCOUNTER
Called [facility] spoke with Joycelyn Munoz to schedule patients appt, she asked that I fax over a referral request and she was told I have already faxed and patient has not been contacted yet she did check her wait list and says she has patients referral on her desk to work on. Patient has been notified.

## 2021-05-28 NOTE — ASSESSMENT & PLAN NOTE
Uncontrolled with ACT 14. Just resumed Advair. On track to reestablish access to [facility] [address], through [facility]. Continue Xopenex. Add short course oral steroids.

## 2021-05-28 NOTE — PROGRESS NOTES
Chief Complaint   Patient presents with    Asthma     Pt VV for asthma follow up. Asthma flowsheet done. Scored 14.       1. Have you been to the ER, urgent care clinic since your last visit? Hospitalized since your last visit? No    2. Have you seen or consulted any other health care providers outside of the [address] [place] [address] since your last visit? Include any pap smears or colon screening.  No

## 2021-06-08 DIAGNOSIS — J30.1 ALLERGIC RHINITIS DUE TO POLLEN: ICD-10-CM

## 2021-06-08 DIAGNOSIS — J45.40 MODERATE PERSISTENT ASTHMA WITHOUT COMPLICATION: ICD-10-CM

## 2021-06-08 RX ORDER — MONTELUKAST SODIUM 10 MG/1
10 TABLET ORAL DAILY
Qty: 90 TABLET | Refills: 0 | Status: SHIPPED | OUTPATIENT
Start: 2021-06-08

## 2021-06-08 RX ORDER — FLUTICASONE PROPIONATE AND SALMETEROL 100; 50 UG/1; UG/1
1 POWDER RESPIRATORY (INHALATION) EVERY 12 HOURS
Qty: 180 EACH | Refills: 0 | Status: SHIPPED | OUTPATIENT
Start: 2021-06-08 | End: 2021-09-03

## 2021-06-08 NOTE — TELEPHONE ENCOUNTER
Received fax from [address] for refills on patients Advair and his Singulair, called patient to verify he was requesting these be sent to [facility] patient has verified that he would like his medications sent in to [address] Meds have been pended please review and sign if appropriate. Patient was seen for his asthma on 5/28/21.

## 2021-07-20 ENCOUNTER — HOSPITAL ENCOUNTER (OUTPATIENT)
Dept: LAB | Age: 71
Discharge: HOME | End: 2021-07-20
Payer: MEDICARE

## 2021-07-20 ENCOUNTER — OFFICE VISIT (OUTPATIENT)
Dept: FAMILY MEDICINE CLINIC | Age: 71
End: 2021-07-20
Payer: MEDICARE

## 2021-07-20 VITALS
OXYGEN SATURATION: 98 % | TEMPERATURE: 98.5 F | BODY MASS INDEX: 23.13 KG/M2 | HEART RATE: 88 BPM | RESPIRATION RATE: 14 BRPM | DIASTOLIC BLOOD PRESSURE: 68 MMHG | WEIGHT: 161.2 LBS | SYSTOLIC BLOOD PRESSURE: 110 MMHG

## 2021-07-20 DIAGNOSIS — R74.01 ELEVATED LIVER TRANSAMINASE LEVEL: ICD-10-CM

## 2021-07-20 DIAGNOSIS — M10.9 ACUTE GOUT INVOLVING TOE, UNSPECIFIED CAUSE, UNSPECIFIED LATERALITY: Primary | ICD-10-CM

## 2021-07-20 LAB
ALBUMIN SERPL-MCNC: 3.4 G/DL (ref 3.4–5)
ALBUMIN/GLOB SERPL: 1 {RATIO} (ref 0.8–1.7)
ALP SERPL-CCNC: 64 U/L (ref 45–117)
ALT SERPL-CCNC: 15 U/L (ref 16–61)
ANION GAP SERPL CALC-SCNC: 6 MMOL/L (ref 3–18)
AST SERPL-CCNC: 16 U/L (ref 10–38)
BILIRUB SERPL-MCNC: 0.5 MG/DL (ref 0.2–1)
BUN SERPL-MCNC: 12 MG/DL (ref 7–18)
BUN/CREAT SERPL: 12 (ref 12–20)
CALCIUM SERPL-MCNC: 8.8 MG/DL (ref 8.5–10.1)
CHLORIDE SERPL-SCNC: 105 MMOL/L (ref 100–111)
CO2 SERPL-SCNC: 27 MMOL/L (ref 21–32)
CREAT SERPL-MCNC: 1 MG/DL (ref 0.6–1.3)
GLOBULIN SER CALC-MCNC: 3.4 G/DL (ref 2–4)
GLUCOSE SERPL-MCNC: 95 MG/DL (ref 74–99)
POTASSIUM SERPL-SCNC: 4.4 MMOL/L (ref 3.5–5.5)
PROT SERPL-MCNC: 6.8 G/DL (ref 6.4–8.2)
SODIUM SERPL-SCNC: 138 MMOL/L (ref 136–145)

## 2021-07-20 PROCEDURE — G8420 CALC BMI NORM PARAMETERS: HCPCS | Performed by: FAMILY MEDICINE

## 2021-07-20 PROCEDURE — G9717 DOC PT DX DEP/BP F/U NT REQ: HCPCS | Performed by: FAMILY MEDICINE

## 2021-07-20 PROCEDURE — 3017F COLORECTAL CA SCREEN DOC REV: CPT | Performed by: FAMILY MEDICINE

## 2021-07-20 PROCEDURE — G8427 DOCREV CUR MEDS BY ELIG CLIN: HCPCS | Performed by: FAMILY MEDICINE

## 2021-07-20 PROCEDURE — G8752 SYS BP LESS 140: HCPCS | Performed by: FAMILY MEDICINE

## 2021-07-20 PROCEDURE — 1101F PT FALLS ASSESS-DOCD LE1/YR: CPT | Performed by: FAMILY MEDICINE

## 2021-07-20 PROCEDURE — G8754 DIAS BP LESS 90: HCPCS | Performed by: FAMILY MEDICINE

## 2021-07-20 PROCEDURE — 99213 OFFICE O/P EST LOW 20 MIN: CPT | Performed by: FAMILY MEDICINE

## 2021-07-20 PROCEDURE — 80053 COMPREHEN METABOLIC PANEL: CPT

## 2021-07-20 PROCEDURE — G8536 NO DOC ELDER MAL SCRN: HCPCS | Performed by: FAMILY MEDICINE

## 2021-07-20 NOTE — PROGRESS NOTES
Patient here for urgent care follow where he was diagnosed with GOUT. 1. Have you been to the ER, urgent care clinic since your last visit? Hospitalized since your last visit? Yes When: 7/5 Where: [facility] Reason for visit: foot pain, marianna  2. Have you seen or consulted any other health care providers outside of the [address] [place] [address] since your last visit? Include any pap smears or colon screening. No    Medication reconciliation has been completed with patient. Care team discussed/updated as well as pharmacy.     Health Maintenance Due   Topic Date Due    Shingrix Vaccine Age 49> (1 of 2) Never done    Colorectal Cancer Screening Combo  06/18/2020    DTaP/Tdap/Td series (2 - Td or Tdap) 09/09/2020    Lipid Screen  02/28/2021    COVID-19 Vaccine (2 - Moderna 2-dose series) 04/22/2021    Medicare Yearly Exam  05/27/2021

## 2021-07-20 NOTE — PATIENT INSTRUCTIONS
Purine-Restricted Diet: Care Instructions  Your Care Instructions     Purines are substances that are found in some foods. Your body turns purines into uric acid. High levels of uric acid can cause gout, which is a form of arthritis that causes pain and inflammation in joints. You may be able to help control the amount of uric acid in your body by limiting high-purine foods in your diet. Follow-up care is a key part of your treatment and safety. Be sure to make and go to all appointments, and call your doctor if you are having problems. It's also a good idea to know your test results and keep a list of the medicines you take. How can you care for yourself at home? · Plan your meals and snacks around foods that are low in purines and are safe for you to eat. These foods include:  ? Green vegetables and tomatoes. ? Fruits. ? Whole-grain breads, rice, and cereals. ? Eggs, peanut butter, and nuts. ? Low-fat milk, cheese, and other milk products. ? Popcorn. ? Gelatin desserts, chocolate, cocoa, and cakes and sweets, in small amounts. · You can eat certain foods that are medium-high in purines, but eat them only once in a while. These foods include:  ? Legumes, such as dried beans and dried peas. You can have 1 cup cooked legumes each day. ? Asparagus, cauliflower, spinach, mushrooms, and green peas. ? Fish and seafood (other than very high-purine seafood). ? Oatmeal, wheat bran, and wheat germ. · Limit very high-purine foods, including:  ? Organ meats, such as liver, kidneys, sweetbreads, and brains. ? Meats, including new, beef, pork, and lamb. ? Game meats and any other meats in large amounts. ? Anchovies, sardines, herring, mackerel, and scallops. ? Gravy. ? Beer. Where can you learn more? Go to http://www.gray.com/  Enter F448 in the search box to learn more about \"Purine-Restricted Diet: Care Instructions. \"  Current as of: December 17, 2020               Content Version: 12.8  © 1658-1816 Healthwise, Inc[employer]. Care instructions adapted under license by [employer] (which disclaims liability or warranty for this information). If you have questions about a medical condition or this instruction, always ask your healthcare professional. [address] any warranty or liability for your use of this information.

## 2021-07-20 NOTE — ASSESSMENT & PLAN NOTE
Unclear etiology. No vit E, no ETOH. Neg prior viral eval. If elevated, pursue imaging as would be worrisome for metastatic disease from prostate.

## 2021-07-20 NOTE — ASSESSMENT & PLAN NOTE
Presumptive based on affected joints and fam hx. Pain resolved. Too soon for accurate uric acid. Treatment plan will depend in part on liver status. Purine restricted diet. Hydration. follow up 1 month labs prior.

## 2021-07-20 NOTE — PROGRESS NOTES
Fer Cole (: 1950) is a 69 y.o. male, established patient, here for:    ASSESSMENT/PLAN:  1. Acute gout involving toe, unspecified cause, unspecified laterality  Assessment & Plan:  Presumptive based on affected joints and fam hx. Pain resolved. Too soon for accurate uric acid. Treatment plan will depend in part on liver status. Purine restricted diet. Hydration. follow up 1 month labs prior. 2. Elevated liver transaminase level  Assessment & Plan:  Unclear etiology. No vit E, no ETOH. Neg prior viral eval. If elevated, pursue imaging as would be worrisome for metastatic disease from prostate. Orders:  -     METABOLIC PANEL, COMPREHENSIVE; Future      Return in about 1 month (around 2021) for gout (presumptive) follow up, non fasting labs 1 week prior, subject to change based on results. SUBJECTIVE/OBJECTIVE:  HPI    Diagnosed with gout at     Two attacks: first in , then again , each great toe - different feet  Treated with indomethacin x 7 days - with significant but incomplete relief  Second attack treated with prednisone. Complete relief. Pain free x 3-4 days    Fam hx gout in 2 sisters    Elevated liver enzymes - not on Vit E  OTC topical therapy for nail fungus has been effective      No results found for: URICA, UAU1  Lab Results   Component Value Date/Time    Sodium 141 2020 03:33 PM    Potassium 4.4 2020 03:33 PM    Chloride 106 2020 03:33 PM    CO2 27 2020 03:33 PM    Anion gap 8 2020 03:33 PM    Glucose 100 (H) 2020 03:33 PM    BUN 16 2020 03:33 PM    Creatinine 1.28 2020 03:33 PM    BUN/Creatinine ratio 13 2020 03:33 PM    GFR est AA >60 2020 03:33 PM    GFR est non-AA 55 (L) 2020 03:33 PM    Calcium 9.2 2020 03:33 PM    Bilirubin, total 0.4 2020 03:33 PM    Alk.  phosphatase 75 2020 03:33 PM    Protein, total 7.9 2020 03:33 PM    Albumin 3.8 2020 03:33 PM    Globulin 4.1 (H) 11/13/2020 03:33 PM    A-G Ratio 0.9 11/13/2020 03:33 PM    ALT (SGPT) 82 (H) 11/13/2020 03:33 PM    AST (SGOT) 67 (H) 11/13/2020 03:33 PM         Physical Exam  Constitutional:       General: He is not in acute distress. Appearance: He is well-developed. HENT:      Head: Normocephalic and atraumatic. Pulmonary:      Effort: Pulmonary effort is normal.   Musculoskeletal:      Comments: Bony enlargement with valgus deformity both 1st MTPs. No erythema. Neurological:      Mental Status: He is alert and oriented to person, place, and time. Psychiatric:         Behavior: Behavior normal.         Thought Content:  Thought content normal.         Judgment: Judgment normal.               -- Selena Guerrero MD

## 2021-07-23 ENCOUNTER — TELEPHONE (OUTPATIENT)
Dept: FAMILY MEDICINE CLINIC | Age: 71
End: 2021-07-23

## 2021-07-27 NOTE — TELEPHONE ENCOUNTER
Called patient  verified told Dr. Beth Cosby recommended [facility]. Pulmonary he says he was d/c from this office after missing 2 appts with them. Told I will search for another Pulmonary Specialists and will call him with that information.

## 2021-08-27 ENCOUNTER — CLINICAL SUPPORT (OUTPATIENT)
Dept: FAMILY MEDICINE CLINIC | Age: 71
End: 2021-08-27
Payer: MEDICARE

## 2021-08-27 ENCOUNTER — HOSPITAL ENCOUNTER (OUTPATIENT)
Dept: LAB | Age: 71
Discharge: HOME | End: 2021-08-27
Payer: MEDICARE

## 2021-08-27 DIAGNOSIS — C61 PROSTATE CANCER (HCC): Primary | ICD-10-CM

## 2021-08-27 DIAGNOSIS — M10.9 ACUTE GOUT INVOLVING TOE, UNSPECIFIED CAUSE, UNSPECIFIED LATERALITY: ICD-10-CM

## 2021-08-27 LAB — URATE SERPL-MCNC: 7.4 MG/DL (ref 2.6–7.2)

## 2021-08-27 PROCEDURE — 84550 ASSAY OF BLOOD/URIC ACID: CPT

## 2021-08-27 PROCEDURE — 36415 COLL VENOUS BLD VENIPUNCTURE: CPT | Performed by: FAMILY MEDICINE

## 2021-08-27 NOTE — PROGRESS NOTES
Elinorazra Martin 1950 came in to have blood drawn. Name/ verified. Venipuncture performed on right arm. Pt tolerated it well.      John Vogt LPN

## 2021-08-30 ENCOUNTER — TELEPHONE (OUTPATIENT)
Dept: FAMILY MEDICINE CLINIC | Age: 71
End: 2021-08-30

## 2021-08-30 NOTE — TELEPHONE ENCOUNTER
Mrs. Xavier Reynolds called the office stating patient woke up this AM itching all over his body they are not sure what is causing this and wanted to know if patient could be seen. Mrs Xavier Reynolds was made aware Dr. Cleotis Opitz was not in office today but she did have a cancellation for tomorrow afternoon that I could schedule patient in this slot, patient declined stating he was in too much distress and could not wait until tomorrow, he will be seen at urgent care today.

## 2021-09-03 ENCOUNTER — OFFICE VISIT (OUTPATIENT)
Dept: FAMILY MEDICINE CLINIC | Age: 71
End: 2021-09-03
Payer: MEDICARE

## 2021-09-03 VITALS
HEART RATE: 88 BPM | TEMPERATURE: 98.4 F | RESPIRATION RATE: 10 BRPM | SYSTOLIC BLOOD PRESSURE: 130 MMHG | OXYGEN SATURATION: 95 % | DIASTOLIC BLOOD PRESSURE: 66 MMHG | WEIGHT: 155 LBS | HEIGHT: 70 IN | BODY MASS INDEX: 22.19 KG/M2

## 2021-09-03 DIAGNOSIS — L50.9 URTICARIA: Primary | ICD-10-CM

## 2021-09-03 PROCEDURE — 99213 OFFICE O/P EST LOW 20 MIN: CPT | Performed by: FAMILY MEDICINE

## 2021-09-03 PROCEDURE — G9717 DOC PT DX DEP/BP F/U NT REQ: HCPCS | Performed by: FAMILY MEDICINE

## 2021-09-03 PROCEDURE — 1101F PT FALLS ASSESS-DOCD LE1/YR: CPT | Performed by: FAMILY MEDICINE

## 2021-09-03 PROCEDURE — G8427 DOCREV CUR MEDS BY ELIG CLIN: HCPCS | Performed by: FAMILY MEDICINE

## 2021-09-03 PROCEDURE — G8536 NO DOC ELDER MAL SCRN: HCPCS | Performed by: FAMILY MEDICINE

## 2021-09-03 PROCEDURE — G8752 SYS BP LESS 140: HCPCS | Performed by: FAMILY MEDICINE

## 2021-09-03 PROCEDURE — G8420 CALC BMI NORM PARAMETERS: HCPCS | Performed by: FAMILY MEDICINE

## 2021-09-03 PROCEDURE — 3017F COLORECTAL CA SCREEN DOC REV: CPT | Performed by: FAMILY MEDICINE

## 2021-09-03 PROCEDURE — G8754 DIAS BP LESS 90: HCPCS | Performed by: FAMILY MEDICINE

## 2021-09-03 RX ORDER — PREDNISONE 10 MG/1
TABLET ORAL
Qty: 50 TABLET | Refills: 0 | Status: SHIPPED | OUTPATIENT
Start: 2021-09-03 | End: 2021-09-23

## 2021-09-03 RX ORDER — FLUTICASONE PROPIONATE AND SALMETEROL 500; 50 UG/1; UG/1
1 POWDER RESPIRATORY (INHALATION) EVERY 12 HOURS
COMMUNITY

## 2021-09-03 NOTE — PROGRESS NOTES
Zachary Orr (: 1950) is a 66 y.o. male, established patient, here for:    ASSESSMENT/PLAN:  1. Urticaria  -     predniSONE (DELTASONE) 10 mg tablet; Take 40 mg by mouth daily (with breakfast) for 5 days, THEN 30 mg daily (with breakfast) for 5 days, THEN 20 mg daily (with breakfast) for 5 days, THEN 10 mg daily (with breakfast) for 5 days. , Normal, Disp-50 Tablet, R-0  due to insect bite. Not anaphylaxis    Return in about 1 month (around 10/3/2021) for gout treatment options. SUBJECTIVE/OBJECTIVE:  HPI    Pruritic rash x 4 days. Began 1 day after sustaining insect bite left forearm. Itching started locally and spread generally, with rash that initially involved only the right forearm. [facility] 2021    tx hydrocortisone 1% ointment. Not effective    Since then rash has migrated: abdomen, LE's - \"welts\" that come and go    No resp symptoms. Physical Exam  Constitutional:       General: He is not in acute distress. Appearance: He is well-developed. HENT:      Head: Normocephalic and atraumatic. Pulmonary:      Effort: Pulmonary effort is normal.   Skin:     Findings: Rash present. Rash is urticarial (LEs only at present, remaining skin clear, even though pruritis is geneeralized). Neurological:      Mental Status: He is alert and oriented to person, place, and time. Psychiatric:         Behavior: Behavior normal.         Thought Content:  Thought content normal.         Judgment: Judgment normal.               -- Joanna Be MD

## 2021-09-03 NOTE — PROGRESS NOTES
Patient is being seen today for itching that he says he woke up with on Monday AM. He was seen at [facility] ER that same day he was given hydrocortisone topical that is not helping at all. He has also used Benadryl oral and topical as well as calamine lotion that did not help he denies any use of new products. He says he did notice a small Red Devil on his left arm that had to dots in the middle that he assumed was an insect bite. 1. Have you been to the ER, urgent care clinic since your last visit? Hospitalized since your last visit? Was seen at [facility] ER on Monday for itching. 2. Have you seen or consulted any other health care providers outside of the [address] [place] [address] since your last visit? Include any pap smears or colon screening. Has seen his Pulmonologists     Medication reconciliation has been completed with patient. Care team discussed/updated as well as pharmacy.     Health Maintenance Due   Topic Date Due    Shingrix Vaccine Age 49> (1 of 2) Never done    Colorectal Cancer Screening Combo  06/18/2020    DTaP/Tdap/Td series (2 - Td or Tdap) 09/09/2020    Lipid Screen  02/28/2021    COVID-19 Vaccine (2 - Moderna 2-dose series) 04/22/2021    Medicare Yearly Exam  05/27/2021    Flu Vaccine (1) 09/01/2021

## 2021-09-03 NOTE — PATIENT INSTRUCTIONS
Hives: Care Instructions  Your Care Instructions  Hives are raised, red, itchy patches of skin. They are also called wheals or welts. They usually have red borders and pale centers. Hives range in size from ¼ inch to 3 inches or more across. They may seem to move from place to place on the skin. Several hives may form a large area of raised, red skin. You can get hives after an insect sting, after taking medicine or eating certain foods, or because of infection or stress. Other causes include plants, things you breathe in, makeup, heat, cold, sunlight, and latex. You cannot spread hives to other people. Hives may last a few minutes or a few days, but a single spot may last less than 36 hours. Follow-up care is a key part of your treatment and safety. Be sure to make and go to all appointments, and call your doctor if you are having problems. It's also a good idea to know your test results and keep a list of the medicines you take. How can you care for yourself at home? · Avoid whatever you think may have caused your hives, such as a certain food or medicine. However, you may not know the cause. · Put a cool, wet towel on the area to relieve itching. · Take an over-the-counter antihistamine, such as diphenhydramine (Benadryl), cetirizine (Zyrtec), or loratadine (Claritin), to help stop the hives and calm the itching. Read and follow directions on the label. These medicines can make you feel sleepy. Do not drive while using them. · Stay away from strong soaps, detergents, and chemicals. These can make itching worse. When should you call for help? Call 911 anytime you think you may need emergency care. For example, call if:    · You have symptoms of a severe allergic reaction. These may include:  ? Sudden raised, red areas (hives) all over your body. ? Swelling of the throat, mouth, lips, or tongue. ? Trouble breathing. ? Passing out (losing consciousness).  Or you may feel very lightheaded or suddenly feel weak, confused, or restless. Call your doctor now or seek immediate medical care if:    · You have symptoms of an allergic reaction, such as:  ? A rash or hives (raised, red areas on the skin). ? Itching. ? Swelling. ? Belly pain, nausea, or vomiting.     · You get hives after you start a new medicine.     · Hives have not gone away after 24 hours. Watch closely for changes in your health, and be sure to contact your doctor if:    · You do not get better as expected. Where can you learn more? Go to http://www.gray.com/  Enter [id number] in the search box to learn more about \"Hives: Care Instructions. \"  Current as of: February 26, 2020               Content Version: 12.8  © 2006-2021 [employer]. Care instructions adapted under license by [employer] (which disclaims liability or warranty for this information). If you have questions about a medical condition or this instruction, always ask your healthcare professional. [address] any warranty or liability for your use of this information.

## 2021-09-24 ENCOUNTER — OFFICE VISIT (OUTPATIENT)
Dept: FAMILY MEDICINE CLINIC | Age: 71
End: 2021-09-24
Payer: MEDICARE

## 2021-09-24 VITALS
RESPIRATION RATE: 12 BRPM | OXYGEN SATURATION: 100 % | BODY MASS INDEX: 22.67 KG/M2 | WEIGHT: 158 LBS | HEART RATE: 77 BPM | TEMPERATURE: 98.1 F | SYSTOLIC BLOOD PRESSURE: 118 MMHG | DIASTOLIC BLOOD PRESSURE: 74 MMHG

## 2021-09-24 DIAGNOSIS — M10.9 ACUTE GOUT INVOLVING TOE, UNSPECIFIED CAUSE, UNSPECIFIED LATERALITY: Primary | ICD-10-CM

## 2021-09-24 PROCEDURE — G9717 DOC PT DX DEP/BP F/U NT REQ: HCPCS | Performed by: FAMILY MEDICINE

## 2021-09-24 PROCEDURE — G8420 CALC BMI NORM PARAMETERS: HCPCS | Performed by: FAMILY MEDICINE

## 2021-09-24 PROCEDURE — 3017F COLORECTAL CA SCREEN DOC REV: CPT | Performed by: FAMILY MEDICINE

## 2021-09-24 PROCEDURE — 1101F PT FALLS ASSESS-DOCD LE1/YR: CPT | Performed by: FAMILY MEDICINE

## 2021-09-24 PROCEDURE — G8536 NO DOC ELDER MAL SCRN: HCPCS | Performed by: FAMILY MEDICINE

## 2021-09-24 PROCEDURE — G8754 DIAS BP LESS 90: HCPCS | Performed by: FAMILY MEDICINE

## 2021-09-24 PROCEDURE — G8752 SYS BP LESS 140: HCPCS | Performed by: FAMILY MEDICINE

## 2021-09-24 PROCEDURE — G8427 DOCREV CUR MEDS BY ELIG CLIN: HCPCS | Performed by: FAMILY MEDICINE

## 2021-09-24 PROCEDURE — 99212 OFFICE O/P EST SF 10 MIN: CPT | Performed by: FAMILY MEDICINE

## 2021-09-24 NOTE — ASSESSMENT & PLAN NOTE
No subsequent attacks. Discussed risks/benefits of allopurinol. Electing trial diet management. follow up prn.

## 2021-09-24 NOTE — PROGRESS NOTES
Tony Atkinson (: 1950) is a 66 y.o. male, established patient, here for:    ASSESSMENT/PLAN:  1. Acute gout involving toe, unspecified cause, unspecified laterality  Assessment & Plan:  No subsequent attacks. Discussed risks/benefits of allopurinol. Electing trial diet management. follow up prn. Return in about 1 month (around 10/24/2021) for Medicare Wellness Visit - no labs prior, (30). SUBJECTIVE/OBJECTIVE:  HPI    follow up presumptive acute gout attack. Has sought insight from 2 sisters with it. Read guidance. Changed diet. Loves beans (no change) and salmon (less). Avoiding red meat. No ETOH. Results for orders placed or performed during the hospital encounter of 21   URIC ACID   Result Value Ref Range    Uric acid 7.4 (H) 2.6 - 7.2 MG/DL     Lab Results   Component Value Date/Time    Sodium 138 2021 09:30 AM    Potassium 4.4 2021 09:30 AM    Chloride 105 2021 09:30 AM    CO2 27 2021 09:30 AM    Anion gap 6 2021 09:30 AM    Glucose 95 2021 09:30 AM    BUN 12 2021 09:30 AM    Creatinine 1.00 2021 09:30 AM    BUN/Creatinine ratio 12 2021 09:30 AM    GFR est AA >60 2021 09:30 AM    GFR est non-AA >60 2021 09:30 AM    Calcium 8.8 2021 09:30 AM    Bilirubin, total 0.5 2021 09:30 AM    Alk. phosphatase 64 2021 09:30 AM    Protein, total 6.8 2021 09:30 AM    Albumin 3.4 2021 09:30 AM    Globulin 3.4 2021 09:30 AM    A-G Ratio 1.0 2021 09:30 AM    ALT (SGPT) 15 (L) 2021 09:30 AM    AST (SGOT) 16 2021 09:30 AM           Physical Exam  Constitutional:       General: He is not in acute distress. Appearance: He is well-developed. HENT:      Head: Normocephalic and atraumatic. Pulmonary:      Effort: Pulmonary effort is normal.   Neurological:      Mental Status: He is alert and oriented to person, place, and time.    Psychiatric:         Behavior: Behavior normal. Thought Content:  Thought content normal.         Judgment: Judgment normal.               -- Yuni Dsouza MD

## 2021-09-24 NOTE — PROGRESS NOTES
Chief Complaint   Patient presents with    Gout     Pt in office for f/u gout. 1. \"Have you been to the ER, urgent care clinic since your last visit? Hospitalized since your last visit? \" No    2. \"Have you seen or consulted any other health care providers outside of the [address] [place] [address] since your last visit? \" No     3. For patients aged 39-70: Has the patient had a colonoscopy? Yes, HM satisfied with blue hyperlink     If the patient is female:    4. For patients aged 41-77: Has the patient had a mammogram within the past 2 years? No    5. For patients aged 21-65: Has the patient had a pap smear?  No

## 2021-10-25 ENCOUNTER — OFFICE VISIT (OUTPATIENT)
Dept: FAMILY MEDICINE CLINIC | Age: 71
End: 2021-10-25
Payer: MEDICARE

## 2021-10-25 ENCOUNTER — HOSPITAL ENCOUNTER (OUTPATIENT)
Dept: LAB | Age: 71
Discharge: HOME | End: 2021-10-25
Payer: MEDICARE

## 2021-10-25 VITALS
SYSTOLIC BLOOD PRESSURE: 138 MMHG | WEIGHT: 170.2 LBS | TEMPERATURE: 96.8 F | HEIGHT: 70 IN | RESPIRATION RATE: 14 BRPM | BODY MASS INDEX: 24.37 KG/M2 | DIASTOLIC BLOOD PRESSURE: 88 MMHG | OXYGEN SATURATION: 98 % | HEART RATE: 107 BPM

## 2021-10-25 DIAGNOSIS — M1A.9XX0 CHRONIC GOUT INVOLVING TOE OF RIGHT FOOT WITHOUT TOPHUS, UNSPECIFIED CAUSE: Primary | ICD-10-CM

## 2021-10-25 DIAGNOSIS — Z13.6 SCREENING FOR ISCHEMIC HEART DISEASE: ICD-10-CM

## 2021-10-25 DIAGNOSIS — Z12.11 SCREEN FOR COLON CANCER: ICD-10-CM

## 2021-10-25 DIAGNOSIS — Z23 ENCOUNTER FOR IMMUNIZATION: ICD-10-CM

## 2021-10-25 DIAGNOSIS — Z00.00 MEDICARE ANNUAL WELLNESS VISIT, SUBSEQUENT: ICD-10-CM

## 2021-10-25 LAB
CHOLEST SERPL-MCNC: 199 MG/DL
HDLC SERPL-MCNC: 61 MG/DL (ref 40–60)
HDLC SERPL: 3.3 {RATIO} (ref 0–5)
LDLC SERPL CALC-MCNC: 123.2 MG/DL (ref 0–100)
LIPID PROFILE,FLP: ABNORMAL
TRIGL SERPL-MCNC: 74 MG/DL (ref ?–150)
VLDLC SERPL CALC-MCNC: 14.8 MG/DL

## 2021-10-25 PROCEDURE — 1101F PT FALLS ASSESS-DOCD LE1/YR: CPT | Performed by: FAMILY MEDICINE

## 2021-10-25 PROCEDURE — 3017F COLORECTAL CA SCREEN DOC REV: CPT | Performed by: FAMILY MEDICINE

## 2021-10-25 PROCEDURE — G0008 ADMIN INFLUENZA VIRUS VAC: HCPCS | Performed by: FAMILY MEDICINE

## 2021-10-25 PROCEDURE — 90694 VACC AIIV4 NO PRSRV 0.5ML IM: CPT | Performed by: FAMILY MEDICINE

## 2021-10-25 PROCEDURE — G8427 DOCREV CUR MEDS BY ELIG CLIN: HCPCS | Performed by: FAMILY MEDICINE

## 2021-10-25 PROCEDURE — G9717 DOC PT DX DEP/BP F/U NT REQ: HCPCS | Performed by: FAMILY MEDICINE

## 2021-10-25 PROCEDURE — 99214 OFFICE O/P EST MOD 30 MIN: CPT | Performed by: FAMILY MEDICINE

## 2021-10-25 PROCEDURE — G8754 DIAS BP LESS 90: HCPCS | Performed by: FAMILY MEDICINE

## 2021-10-25 PROCEDURE — G8536 NO DOC ELDER MAL SCRN: HCPCS | Performed by: FAMILY MEDICINE

## 2021-10-25 PROCEDURE — 80061 LIPID PANEL: CPT

## 2021-10-25 PROCEDURE — G8420 CALC BMI NORM PARAMETERS: HCPCS | Performed by: FAMILY MEDICINE

## 2021-10-25 PROCEDURE — G8752 SYS BP LESS 140: HCPCS | Performed by: FAMILY MEDICINE

## 2021-10-25 PROCEDURE — G0439 PPPS, SUBSEQ VISIT: HCPCS | Performed by: FAMILY MEDICINE

## 2021-10-25 RX ORDER — ALLOPURINOL 300 MG/1
300 TABLET ORAL DAILY
Qty: 90 TABLET | Refills: 1 | Status: SHIPPED | OUTPATIENT
Start: 2021-10-25 | End: 2022-01-25

## 2021-10-25 RX ORDER — ALLOPURINOL 100 MG/1
TABLET ORAL
Qty: 21 TABLET | Refills: 0 | Status: SHIPPED | OUTPATIENT
Start: 2021-10-25 | End: 2021-11-08

## 2021-10-25 RX ORDER — INDOMETHACIN 50 MG/1
CAPSULE ORAL
COMMUNITY
Start: 2021-10-02 | End: 2022-01-25 | Stop reason: DRUGHIGH

## 2021-10-25 NOTE — ACP (ADVANCE CARE PLANNING)
Advance Care Planning     General Advance Care Planning (ACP) Conversation      Date of Conversation: 10/25/2021  Conducted with: Patient with Decision Making Capacity    Healthcare Decision Maker:     Primary Decision Maker: Garett Loaiza - Spouse - [phone]    Secondary Decision Maker: Meena Pillai - Son - [phone]  Click here to complete [employer] including selection of the Healthcare Decision Maker Relationship (ie \"Primary\")      No documents. Care focused on alleviation of suffering at the end of life without life prolonging procedures.      Content/Action Overview:     Reviewed DNR/DNI and patient does not wish to be resuscitated but is declining DDNR         Length of Voluntary ACP Conversation in minutes:  <16 minutes (Non-Billable)    Trevor Walton MD

## 2021-10-25 NOTE — PROGRESS NOTES
This is the Subsequent Medicare Annual Wellness Exam, performed 12 months or more after the Initial AWV or the last Subsequent AWV    I have reviewed the patient's medical history in detail and updated the computerized patient record. Separate service/issue(s) addressed same visit:  Attempts at diet control for gout have not been successful. No pain today. Desires pharm management as we'd previously discussed. Assessment/Plan   Education and counseling provided:  Leonarda Rosalie has ACP has been asked to supply copy to the office    1. Chronic gout involving toe of right foot without tophus, unspecified cause  Assessment & Plan:  Uncontrolled. Allopurinol 100 mg/day x 1 week, ramp up weekly to 300 mg/day. Follow up 3 months, labs prior   Orders:  -     allopurinoL (ZYLOPRIM) 100 mg tablet; Take 1 Tablet by mouth daily for 7 days, THEN 2 Tablets daily for 7 days. , Normal, Disp-21 Tablet, R-0  -     allopurinoL (ZYLOPRIM) 300 mg tablet; Take 1 Tablet by mouth daily. Start after completing the 100 mg dose tabs. , Normal, Disp-90 Tablet, R-1  -     URIC ACID; Future  -     CBC WITH AUTOMATED DIFF; Future  -     METABOLIC PANEL, COMPREHENSIVE; Future  2. Medicare annual wellness visit, subsequent  3. Screening for ischemic heart disease  -     LIPID PANEL; Future  4.  Screen for colon cancer  -     REFERRAL FOR COLONOSCOPY  5. Encounter for immunization  -     FLU (FLUAD QUAD INFLUENZA VACCINE,QUAD,ADJUVANTED)  -     MS IMMUNIZ ADMIN,1 SINGLE/COMB VAC/TOXOID  -     MS IMMUNIZ,ADMIN,EACH ADDL       Depression Risk Factor Screening     3 most recent PHQ Screens 10/25/2021   Little interest or pleasure in doing things Not at all   Feeling down, depressed, irritable, or hopeless Not at all   Total Score PHQ 2 0   Trouble falling or staying asleep, or sleeping too much Not at all   Feeling tired or having little energy Not at all   Poor appetite, weight loss, or overeating Not at all   Feeling bad about yourself - or that you are a failure or have let yourself or your family down Not at all   Trouble concentrating on things such as school, work, reading, or watching TV Not at all   Moving or speaking so slowly that other people could have noticed; or the opposite being so fidgety that others notice Not at all   Thoughts of being better off dead, or hurting yourself in some way Not at all   PHQ 9 Score 0   How difficult have these problems made it for you to do your work, take care of your home and get along with others -       Alcohol Risk Screen    Do you average more than 1 drink per night or more than 7 drinks a week: No    In the past three months have you have had more than 4 drinks containing alcohol on one occasion: Yes - sister's birthday party \"I'll do one birthday a year. \"      Functional Ability and Level of Safety    Hearing: Hearing is good. Activities of Daily Living: The home contains: handrails and grab bars  Patient does total self care      Ambulation: with no difficulty     Fall Risk:  Fall Risk Assessment, last 12 mths 10/25/2021   Able to walk? Yes   Fall in past 12 months? 0   Do you feel unsteady?  0   Are you worried about falling 0      Abuse Screen:  Patient is not abused       Cognitive Screening    Has your family/caregiver stated any concerns about your memory: no     Cognitive Screening: Normal - obs    Health Maintenance Due     Health Maintenance Due   Topic Date Due    Shingrix Vaccine Age 49> (1 of 2) Never done    Colorectal Cancer Screening Combo  06/18/2020    DTaP/Tdap/Td series (2 - Td or Tdap) 09/09/2020    Lipid Screen  02/28/2021    COVID-19 Vaccine (2 - Moderna 2-dose series) 04/22/2021    Medicare Yearly Exam  05/27/2021    Flu Vaccine (1) 09/01/2021       Patient Care Team   Patient Care Team:  Anju Duque MD as PCP - General (Family Medicine)  Anju Duque MD as PCP - [facility] Empaneled Provider  Anju Duque MD (Family Medicine)  Alfredo Dash MD as Surgeon (Surgery)  Jesika Doherty MD (Rheumatology)  Polo Donohue MD (Orthopedic Surgery)  Apolinar Duong MD (Urology)  Royer Katz MD (Pulmonary Disease)    History     Patient Active Problem List   Diagnosis Code    Vitamin D deficiency E55.9    Allergic rhinitis J30.9    Severe persistent asthma without complication P50.70    Lumbosacral radiculopathy due to degenerative joint disease of spine M47.27    Cervical radiculopathy due to degenerative joint disease of spine M47.22    [facility] C61    BPH with obstruction/lower urinary tract symptoms N40.1, N13.8    Family history of prostate cancer Z80.42    Hyperlipidemia Q85.2    Diastolic dysfunction W30.41    Essential hypertension I10    Other specified abnormal immunological findings in serum R76.8    Lumbar spondylosis M47.816    Depression F32. A    Cubital tunnel syndrome on right G56.21    Gastroesophageal reflux disease without esophagitis K21.9    Intrinsic asthma with acute exacerbation, severe persistent J45.51    Elevated liver transaminase level R74.01    Acute gout involving toe M10.9     Past Medical History:   Diagnosis Date    Arthritis     Asthma     Depression     GERD (gastroesophageal reflux disease)     Prostate cancer ([facility] 2/4/2015    Trouble in sleeping       Past Surgical History:   Procedure Laterality Date    HX COLONOSCOPY      HX ORCHIECTOMY  1999    left, varicocele with complications    HX OTHER SURGICAL      epidural injection     GA ABDOMEN SURGERY PROC UNLISTED      hernia repair 6/20/18     Current Outpatient Medications   Medication Sig Dispense Refill    fluticasone propion-salmeteroL (Advair Diskus) 500-50 mcg/dose diskus inhaler Take 1 Puff by inhalation every twelve (12) hours.  montelukast (SINGULAIR) 10 mg tablet Take 1 Tablet by mouth daily.  90 Tablet 0    levalbuterol tartrate (XOPENEX) 45 mcg/actuation inhaler Take 2 Puffs by inhalation every four (4) hours as needed for Wheezing. 1 Inhaler 4    cetirizine-pseudoePHEDrine (ZyrTEC-D) 5-120 mg Tb12 Take 1 Tab by mouth daily. 90 Tab 4    cycloSPORINE (RESTASIS) 0.05 % dpet Administer 1 Drop to both eyes every twelve (12) hours.  Omeprazole delayed release (PRILOSEC D/R) 20 mg tablet Take 1 Tab by mouth daily. (Patient taking differently: Take 20 mg by mouth as needed.) 90 Tab 4    levalbuterol (XOPENEX) 1.25 mg/3 mL nebu 3 mL by Nebulization route every six (6) hours as needed. 1 Package 2    aspirin 81 mg chewable tablet Take 1 Tab by mouth daily. 90 Tab 4    albuterol (PROVENTIL VENTOLIN) 2.5 mg /3 mL (0.083 %) nebulizer solution 3 mL by Nebulization route every four (4) hours as needed for Wheezing. 1 Package 1    FA/MV-MN/MIN AA JANETT/SAW PAL (SAW PALMETO-MULTIVIT-MIN-AA-FA PO) Take 1 Tab by mouth daily.  Cholecalciferol, Vitamin D3, (VITAMIN D) 2,000 unit Cap Take 1 Cap by mouth daily. 30 Cap 11    epinephrine (EPIPEN 2-LUZ MARIA INJECTION)  (Patient not taking: Reported on 9/24/2021)       Allergies   Allergen Reactions    Latex Hives and Itching    Cashew Nut Other (comments)     \"UPSET STOMACH\"    Milk Diarrhea    Other Medication Other (comments)     Pt allergic to cats with respiratory, skin reactions.   Pt allergic to cashews with upset stomach    Vicodin [Hydrocodone-Acetaminophen] Other (comments)     hallucinations       Family History   Problem Relation Age of Onset    Hypertension Mother     Heart Disease Mother     Arthritis-rheumatoid Mother     Hypertension Father     Heart Attack Father 48    Cancer Brother 72        prostate    Cancer Brother 61        prostate    Colon Cancer Brother     Heart Attack Brother 48    Cancer Brother 66        prostate    Diabetes Brother     Heart Attack Sister 61    Colon Polyps Sister     Heart Attack Brother 54    Heart Attack Sister 72     Social History     Tobacco Use    Smoking status: Never Smoker    Smokeless tobacco: Never Used   Substance Use Topics    Alcohol use:  Yes     Alcohol/week: 8.0 standard drinks     Types: 8 Cans of beer per week     Comment: occ

## 2021-10-25 NOTE — ASSESSMENT & PLAN NOTE
Uncontrolled. Allopurinol 100 mg/day x 1 week, ramp up weekly to 300 mg/day.  Follow up 3 months, labs prior

## 2021-10-25 NOTE — PROGRESS NOTES
Patient here today for [address]        1. \"Have you been to the ER, urgent care clinic since your last visit? Hospitalized since your last visit? \" No    2. \"Have you seen or consulted any other health care providers outside of the [address] [place] [address] since your last visit? \" No     3. For patients aged 39-70: Has the patient had a colonoscopy?  No

## 2022-01-19 ENCOUNTER — CLINICAL SUPPORT (OUTPATIENT)
Dept: FAMILY MEDICINE CLINIC | Age: 72
End: 2022-01-19
Payer: MEDICARE

## 2022-01-19 ENCOUNTER — HOSPITAL ENCOUNTER (OUTPATIENT)
Dept: LAB | Age: 72
Discharge: HOME | End: 2022-01-19
Payer: MEDICARE

## 2022-01-19 DIAGNOSIS — M1A.9XX0 CHRONIC GOUT INVOLVING TOE OF RIGHT FOOT WITHOUT TOPHUS, UNSPECIFIED CAUSE: ICD-10-CM

## 2022-01-19 DIAGNOSIS — M1A.9XX0 CHRONIC GOUT INVOLVING TOE OF RIGHT FOOT WITHOUT TOPHUS, UNSPECIFIED CAUSE: Primary | ICD-10-CM

## 2022-01-19 LAB
ALBUMIN SERPL-MCNC: 3.9 G/DL (ref 3.4–5)
ALBUMIN/GLOB SERPL: 1.1 {RATIO} (ref 0.8–1.7)
ALP SERPL-CCNC: 74 U/L (ref 45–117)
ALT SERPL-CCNC: 27 U/L (ref 16–61)
ANION GAP SERPL CALC-SCNC: 6 MMOL/L (ref 3–18)
AST SERPL-CCNC: 23 U/L (ref 10–38)
BASOPHILS # BLD: 0 K/UL (ref 0–0.1)
BASOPHILS NFR BLD: 1 % (ref 0–2)
BILIRUB SERPL-MCNC: 0.6 MG/DL (ref 0.2–1)
BUN SERPL-MCNC: 13 MG/DL (ref 7–18)
BUN/CREAT SERPL: 11 (ref 12–20)
CALCIUM SERPL-MCNC: 9.7 MG/DL (ref 8.5–10.1)
CHLORIDE SERPL-SCNC: 103 MMOL/L (ref 100–111)
CO2 SERPL-SCNC: 29 MMOL/L (ref 21–32)
CREAT SERPL-MCNC: 1.2 MG/DL (ref 0.6–1.3)
DIFFERENTIAL METHOD BLD: ABNORMAL
EOSINOPHIL # BLD: 0.1 K/UL (ref 0–0.4)
EOSINOPHIL NFR BLD: 3 % (ref 0–5)
ERYTHROCYTE [DISTWIDTH] IN BLOOD BY AUTOMATED COUNT: 13.8 % (ref 11.6–14.5)
GLOBULIN SER CALC-MCNC: 3.7 G/DL (ref 2–4)
GLUCOSE SERPL-MCNC: 91 MG/DL (ref 74–99)
HCT VFR BLD AUTO: 38.8 % (ref 36–48)
HGB BLD-MCNC: 12.3 G/DL (ref 13–16)
IMM GRANULOCYTES # BLD AUTO: 0 K/UL (ref 0–0.04)
IMM GRANULOCYTES NFR BLD AUTO: 0 % (ref 0–0.5)
LYMPHOCYTES # BLD: 1.3 K/UL (ref 0.9–3.6)
LYMPHOCYTES NFR BLD: 45 % (ref 21–52)
MCH RBC QN AUTO: 30.2 PG (ref 24–34)
MCHC RBC AUTO-ENTMCNC: 31.7 G/DL (ref 31–37)
MCV RBC AUTO: 95.3 FL (ref 78–100)
MONOCYTES # BLD: 0.5 K/UL (ref 0.05–1.2)
MONOCYTES NFR BLD: 17 % (ref 3–10)
NEUTS SEG # BLD: 1 K/UL (ref 1.8–8)
NEUTS SEG NFR BLD: 35 % (ref 40–73)
NRBC # BLD: 0 K/UL (ref 0–0.01)
NRBC BLD-RTO: 0 PER 100 WBC
PLATELET # BLD AUTO: 193 K/UL (ref 135–420)
PMV BLD AUTO: 10.5 FL (ref 9.2–11.8)
POTASSIUM SERPL-SCNC: 4.6 MMOL/L (ref 3.5–5.5)
PROT SERPL-MCNC: 7.6 G/DL (ref 6.4–8.2)
RBC # BLD AUTO: 4.07 M/UL (ref 4.35–5.65)
SODIUM SERPL-SCNC: 138 MMOL/L (ref 136–145)
URATE SERPL-MCNC: 3.2 MG/DL (ref 2.6–7.2)
WBC # BLD AUTO: 3 K/UL (ref 4.6–13.2)

## 2022-01-19 PROCEDURE — 36415 COLL VENOUS BLD VENIPUNCTURE: CPT | Performed by: FAMILY MEDICINE

## 2022-01-19 PROCEDURE — 85025 COMPLETE CBC W/AUTO DIFF WBC: CPT

## 2022-01-19 PROCEDURE — 84550 ASSAY OF BLOOD/URIC ACID: CPT

## 2022-01-19 PROCEDURE — 80053 COMPREHEN METABOLIC PANEL: CPT

## 2022-01-19 PROCEDURE — 36415 COLL VENOUS BLD VENIPUNCTURE: CPT

## 2022-01-25 ENCOUNTER — OFFICE VISIT (OUTPATIENT)
Dept: FAMILY MEDICINE CLINIC | Age: 72
End: 2022-01-25
Payer: MEDICARE

## 2022-01-25 VITALS
SYSTOLIC BLOOD PRESSURE: 134 MMHG | OXYGEN SATURATION: 99 % | HEIGHT: 70 IN | HEART RATE: 88 BPM | BODY MASS INDEX: 23.05 KG/M2 | DIASTOLIC BLOOD PRESSURE: 60 MMHG | WEIGHT: 161 LBS | RESPIRATION RATE: 12 BRPM | TEMPERATURE: 98.6 F

## 2022-01-25 DIAGNOSIS — M1A.9XX0 CHRONIC GOUT INVOLVING TOE OF RIGHT FOOT WITHOUT TOPHUS, UNSPECIFIED CAUSE: ICD-10-CM

## 2022-01-25 PROCEDURE — G9717 DOC PT DX DEP/BP F/U NT REQ: HCPCS | Performed by: FAMILY MEDICINE

## 2022-01-25 PROCEDURE — 99214 OFFICE O/P EST MOD 30 MIN: CPT | Performed by: FAMILY MEDICINE

## 2022-01-25 PROCEDURE — G8754 DIAS BP LESS 90: HCPCS | Performed by: FAMILY MEDICINE

## 2022-01-25 PROCEDURE — 3017F COLORECTAL CA SCREEN DOC REV: CPT | Performed by: FAMILY MEDICINE

## 2022-01-25 PROCEDURE — G8536 NO DOC ELDER MAL SCRN: HCPCS | Performed by: FAMILY MEDICINE

## 2022-01-25 PROCEDURE — G8752 SYS BP LESS 140: HCPCS | Performed by: FAMILY MEDICINE

## 2022-01-25 PROCEDURE — G8420 CALC BMI NORM PARAMETERS: HCPCS | Performed by: FAMILY MEDICINE

## 2022-01-25 PROCEDURE — G8427 DOCREV CUR MEDS BY ELIG CLIN: HCPCS | Performed by: FAMILY MEDICINE

## 2022-01-25 PROCEDURE — 1101F PT FALLS ASSESS-DOCD LE1/YR: CPT | Performed by: FAMILY MEDICINE

## 2022-01-25 RX ORDER — INDOMETHACIN 50 MG/1
50 CAPSULE ORAL
Qty: 30 CAPSULE | Refills: 2 | Status: SHIPPED | OUTPATIENT
Start: 2022-01-25 | End: 2022-10-28 | Stop reason: SDUPTHER

## 2022-01-25 RX ORDER — INDOMETHACIN 50 MG/1
CAPSULE ORAL
Status: CANCELLED | OUTPATIENT
Start: 2022-01-25

## 2022-01-25 RX ORDER — ALLOPURINOL 100 MG/1
100 TABLET ORAL DAILY
Qty: 90 TABLET | Refills: 3 | Status: SHIPPED | OUTPATIENT
Start: 2022-01-25 | End: 2022-10-28 | Stop reason: SDUPTHER

## 2022-01-25 NOTE — PROGRESS NOTES
Daphne Sanchez (: 1950) is a 66 y.o. male, established patient, here for:    ASSESSMENT/PLAN:  1. 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 Uric acid 3.2 is well within target. Will decrease allopurinol to 100 mg daily. Indomethacin on hand as needed. Follow up in Oct attendant with [address], labs prior. Sooner as needed. Orders:  -     allopurinoL (ZYLOPRIM) 100 mg tablet; Take 1 Tablet by mouth daily. Start after completing the 100 mg dose tabs. , Normal, Disp-90 Tablet, R-3  -     indomethacin (INDOCIN) 50 mg capsule; Take 1 Capsule by mouth three (3) times daily as needed for Gout., Normal, Disp-30 Capsule, R-2  -     URIC ACID; Future  -     METABOLIC PANEL, COMPREHENSIVE; Future  -     CBC WITH AUTOMATED DIFF; Future      Return in about 9 months (around 10/25/2022) for [address] and gout follow up, labs prior, (30). SUBJECTIVE/OBJECTIVE:  HPI  Interim - prostate cancer. Seeing Dr. Jose A Michelle. Doing well. PSA down from to 5. Bx planned in a few months. follow up gout - no acute attacks. Will experience pain in the right foot after prolonged activity. Physical Exam  Constitutional:       General: He is not in acute distress. Appearance: He is well-developed. HENT:      Head: Normocephalic and atraumatic. Pulmonary:      Effort: Pulmonary effort is normal.   Neurological:      Mental Status: He is alert and oriented to person, place, and time. Psychiatric:         Behavior: Behavior normal.         Thought Content:  Thought content normal.         Judgment: Judgment normal.               -- Nafisa Ramirez MD

## 2022-01-25 NOTE — PROGRESS NOTES
Patient being seen in office today for a 3 month f/u on his GOUT and lab results. He is asking for refills today to be sent in.     1. \"Have you been to the ER, urgent care clinic since your last visit? Hospitalized since your last visit? \" No    2. \"Have you seen or consulted any other health care providers outside of the [address] [place] [address] since your last visit? \" No     3. For patients aged 39-70: Has the patient had a colonoscopy / FIT/ Cologuard? Yes - no Care Gap present      If the patient is female:    4. For patients aged 41-77: Has the patient had a mammogram within the past 2 years? NA - based on age or sex  See top three    5. For patients aged 21-65: Has the patient had a pap smear?  NA - based on age or sex

## 2022-01-25 NOTE — ASSESSMENT & PLAN NOTE
Well controlled but decreased Hb since initiation of allopurinol. Uric acid 3.2 is well within target. Will decrease allopurinol to 100 mg daily. Indomethacin on hand as needed. Follow up in Oct attendant with [facility]en, labs prior. Sooner as needed.

## 2022-03-04 PROBLEM — Z86.010 HISTORY OF COLON POLYPS: Status: ACTIVE | Noted: 2022-03-04

## 2022-03-18 PROBLEM — Z86.010 HISTORY OF COLON POLYPS: Status: ACTIVE | Noted: 2022-03-04

## 2022-03-18 PROBLEM — Z86.0100 HISTORY OF COLON POLYPS: Status: ACTIVE | Noted: 2022-03-04

## 2022-03-18 PROBLEM — R76.8 OTHER SPECIFIED ABNORMAL IMMUNOLOGICAL FINDINGS IN SERUM: Status: ACTIVE | Noted: 2018-09-13

## 2022-03-19 PROBLEM — R74.01 ELEVATED LIVER TRANSAMINASE LEVEL: Status: ACTIVE | Noted: 2021-07-20

## 2022-03-19 PROBLEM — K21.9 GASTROESOPHAGEAL REFLUX DISEASE WITHOUT ESOPHAGITIS: Status: ACTIVE | Noted: 2019-04-23

## 2022-03-19 PROBLEM — M1A.9XX0 CHRONIC GOUT INVOLVING TOE: Status: ACTIVE | Noted: 2021-07-20

## 2022-03-19 PROBLEM — M47.816 LUMBAR SPONDYLOSIS: Status: ACTIVE | Noted: 2018-10-23

## 2022-03-19 PROBLEM — F32.A DEPRESSION: Status: ACTIVE | Noted: 2018-12-11

## 2022-03-19 PROBLEM — G56.21 CUBITAL TUNNEL SYNDROME ON RIGHT: Status: ACTIVE | Noted: 2018-12-11

## 2022-03-20 PROBLEM — J45.51: Status: ACTIVE | Noted: 2021-05-28

## 2022-06-10 ENCOUNTER — VIRTUAL VISIT (OUTPATIENT)
Dept: FAMILY MEDICINE CLINIC | Age: 72
End: 2022-06-10
Payer: MEDICARE

## 2022-06-10 ENCOUNTER — PATIENT MESSAGE (OUTPATIENT)
Dept: FAMILY MEDICINE CLINIC | Age: 72
End: 2022-06-10

## 2022-06-10 DIAGNOSIS — C61 PROSTATE CANCER (HCC): Primary | ICD-10-CM

## 2022-06-10 DIAGNOSIS — J45.50 SEVERE PERSISTENT ASTHMA WITHOUT COMPLICATION: ICD-10-CM

## 2022-06-10 PROCEDURE — G8427 DOCREV CUR MEDS BY ELIG CLIN: HCPCS | Performed by: FAMILY MEDICINE

## 2022-06-10 PROCEDURE — 99212 OFFICE O/P EST SF 10 MIN: CPT | Performed by: FAMILY MEDICINE

## 2022-06-10 PROCEDURE — G9717 DOC PT DX DEP/BP F/U NT REQ: HCPCS | Performed by: FAMILY MEDICINE

## 2022-06-10 PROCEDURE — 1123F ACP DISCUSS/DSCN MKR DOCD: CPT | Performed by: FAMILY MEDICINE

## 2022-06-10 PROCEDURE — 1101F PT FALLS ASSESS-DOCD LE1/YR: CPT | Performed by: FAMILY MEDICINE

## 2022-06-10 PROCEDURE — 3017F COLORECTAL CA SCREEN DOC REV: CPT | Performed by: FAMILY MEDICINE

## 2022-06-10 PROCEDURE — G8756 NO BP MEASURE DOC: HCPCS | Performed by: FAMILY MEDICINE

## 2022-06-10 NOTE — PROGRESS NOTES
Chief Complaint   Patient presents with    Other     compassionate reassignment letter       Pt is requesting a compassionate reassignment letter. 1. \"Have you been to the ER, urgent care clinic since your last visit? Hospitalized since your last visit? \" No    2. \"Have you seen or consulted any other health care providers outside of the [address] [place] [address] since your last visit? \" No     3. For patients aged 39-70: Has the patient had a colonoscopy / FIT/ Cologuard?  Yes - no Care Gap present

## 2022-06-10 NOTE — PROGRESS NOTES
Lily Henning is a 66 y.o. male, established patient, who was seen by synchronous (real-time) audio-video technology on 6/10/2022 for     Assessment & Plan:   1. Prostate cancer ([address]  2. Severe persistent asthma without complication        Generated letter of support for compassionate reassignment of his son. 712  Subjective: Son is CWO4 in [employer]. Army is planning to move him to [place]. They are seeking a compassionate reassignment to Qian Triana to [address] [place] [address] as he attends medical therapy for his prostate cancer and asthma. Seeking letter of support from me. Objective:     General: alert, cooperative, no distress   Mental  status: normal mood, behavior, speech, dress, motor activity, and thought processes, able to follow commands   HENT: NCAT   Neck: no visualized mass   Resp: no respiratory distress   Neuro: no gross deficits   Skin: no discoloration or lesions of concern on visible areas   Psychiatric: normal affect, consistent with stated mood, no evidence of hallucinations     Additional exam findings: We discussed the expected course, resolution and complications of the diagnosis(es) in detail. Medication risks, benefits, costs, interactions, and alternatives were discussed as indicated. I advised him to contact the office if his condition worsens, changes or fails to improve as anticipated. He expressed understanding with the diagnosis(es) and plan. Lily Henning, was evaluated through a synchronous (real-time) audio-video encounter. The patient (or guardian if applicable) is aware that this is a billable service, which includes applicable co-pays. This Virtual Visit was conducted with patient's (and/or legal guardian's) consent. The visit was conducted pursuant to the emergency declaration under the [facility], [address] [place] [address] authority and the [employer] General Act.   Patient identification was verified, and a caregiver was present when appropriate. The patient was located at: Home: [place] [id number]  The provider was located at:  Facility (Appt Department): [address]  [address] MD Link

## 2022-06-10 NOTE — LETTER
6/10/2022 4:02 PM    Mr. Feroz Malin  [facility] [id number]      To whom it may concern: The purpose of this letter is to attest to a fact: Feroz Malin Sr. is a [de-identified] year old patient under my care in [place], [place]. He been diagnosed with prostate cancer and he has asthma. He is being treated for both conditions by a team of medical specialists including pulmonologists, urologists, and oncologists. It is my opinion that Mr. William Denson would benefit from the compassionate reassignment of his son, Princess Richard Denson who could support his father through his radiation treatments and follow-on care.          Sincerely,      Luan Sousa MD

## 2022-06-10 NOTE — TELEPHONE ENCOUNTER
From: Marnie Cuevas  To: José Miguel Black MD  Sent: 6/10/2022 1:42 PM EDT  Subject: compassionate reaassignment    To whom it may concern: The purpose of this letter is to attest to a fact: Marnie CuevasSr. is a [de-identified] year old patient under my care in [place], [place]. He been diagnosed with prostate cancer and he has asthma. He is being treated for both conditions by a team of medical specialists including pulmonologists, urologists, and oncologists. It is my opinion that Mr. Farheen Santos would benefit from the compassionate reassignment of his son, Court Santos who could support his father through his radiation treatments and follow-on care.

## 2022-08-21 ENCOUNTER — PATIENT MESSAGE (OUTPATIENT)
Dept: FAMILY MEDICINE CLINIC | Age: 72
End: 2022-08-21

## 2022-08-22 NOTE — TELEPHONE ENCOUNTER
From: Nolan Schaefer  To: Syl Pope MD  Sent: 8/21/2022 9:13 PM EDT  Subject: Thank you    Given my family history I could easily be dead by now. Because of your careful attention, I'm on schedule for my 40th proton treatment and I expect full recovery. I am so grateful to you for paying attention to what I've said and for translating my thoughts and fears into meaningful helpful actions. I won't call you THE great physician, since there is only one, but you are my great physician and I love you for being so faithful and dedicated to your patients; especially to me and my wife.

## 2022-10-21 ENCOUNTER — CLINICAL SUPPORT (OUTPATIENT)
Dept: FAMILY MEDICINE CLINIC | Age: 72
End: 2022-10-21
Payer: MEDICARE

## 2022-10-21 ENCOUNTER — HOSPITAL ENCOUNTER (OUTPATIENT)
Dept: LAB | Age: 72
Discharge: HOME | End: 2022-10-21
Payer: MEDICARE

## 2022-10-21 DIAGNOSIS — M1A.9XX0 CHRONIC GOUT INVOLVING TOE OF RIGHT FOOT WITHOUT TOPHUS, UNSPECIFIED CAUSE: ICD-10-CM

## 2022-10-21 DIAGNOSIS — M1A.9XX0 CHRONIC GOUT INVOLVING TOE OF RIGHT FOOT WITHOUT TOPHUS, UNSPECIFIED CAUSE: Primary | ICD-10-CM

## 2022-10-21 LAB
ALBUMIN SERPL-MCNC: 4 G/DL (ref 3.4–5)
ALBUMIN/GLOB SERPL: 1 {RATIO} (ref 0.8–1.7)
ALP SERPL-CCNC: 81 U/L (ref 45–117)
ALT SERPL-CCNC: 76 U/L (ref 16–61)
ANION GAP SERPL CALC-SCNC: 6 MMOL/L (ref 3–18)
AST SERPL-CCNC: 89 U/L (ref 10–38)
BASOPHILS # BLD: 0 K/UL (ref 0–0.1)
BASOPHILS NFR BLD: 1 % (ref 0–2)
BILIRUB SERPL-MCNC: 0.8 MG/DL (ref 0.2–1)
BUN SERPL-MCNC: 12 MG/DL (ref 7–18)
BUN/CREAT SERPL: 10 (ref 12–20)
CALCIUM SERPL-MCNC: 9.5 MG/DL (ref 8.5–10.1)
CHLORIDE SERPL-SCNC: 105 MMOL/L (ref 100–111)
CO2 SERPL-SCNC: 27 MMOL/L (ref 21–32)
CREAT SERPL-MCNC: 1.17 MG/DL (ref 0.6–1.3)
DIFFERENTIAL METHOD BLD: ABNORMAL
EOSINOPHIL # BLD: 0.1 K/UL (ref 0–0.4)
EOSINOPHIL NFR BLD: 2 % (ref 0–5)
ERYTHROCYTE [DISTWIDTH] IN BLOOD BY AUTOMATED COUNT: 12.1 % (ref 11.6–14.5)
GLOBULIN SER CALC-MCNC: 4.1 G/DL (ref 2–4)
GLUCOSE SERPL-MCNC: 103 MG/DL (ref 74–99)
HCT VFR BLD AUTO: 40 % (ref 36–48)
HGB BLD-MCNC: 13.1 G/DL (ref 13–16)
IMM GRANULOCYTES # BLD AUTO: 0 K/UL (ref 0–0.04)
IMM GRANULOCYTES NFR BLD AUTO: 0 % (ref 0–0.5)
LYMPHOCYTES # BLD: 1.1 K/UL (ref 0.9–3.6)
LYMPHOCYTES NFR BLD: 40 % (ref 21–52)
MCH RBC QN AUTO: 31.3 PG (ref 24–34)
MCHC RBC AUTO-ENTMCNC: 32.8 G/DL (ref 31–37)
MCV RBC AUTO: 95.7 FL (ref 78–100)
MONOCYTES # BLD: 0.6 K/UL (ref 0.05–1.2)
MONOCYTES NFR BLD: 22 % (ref 3–10)
NEUTS SEG # BLD: 1 K/UL (ref 1.8–8)
NEUTS SEG NFR BLD: 35 % (ref 40–73)
NRBC # BLD: 0 K/UL (ref 0–0.01)
NRBC BLD-RTO: 0 PER 100 WBC
PLATELET # BLD AUTO: 175 K/UL (ref 135–420)
PMV BLD AUTO: 10.9 FL (ref 9.2–11.8)
POTASSIUM SERPL-SCNC: 4.1 MMOL/L (ref 3.5–5.5)
PROT SERPL-MCNC: 8.1 G/DL (ref 6.4–8.2)
RBC # BLD AUTO: 4.18 M/UL (ref 4.35–5.65)
SODIUM SERPL-SCNC: 138 MMOL/L (ref 136–145)
URATE SERPL-MCNC: 5.2 MG/DL (ref 2.6–7.2)
WBC # BLD AUTO: 2.8 K/UL (ref 4.6–13.2)

## 2022-10-21 PROCEDURE — 84550 ASSAY OF BLOOD/URIC ACID: CPT

## 2022-10-21 PROCEDURE — 85025 COMPLETE CBC W/AUTO DIFF WBC: CPT

## 2022-10-21 PROCEDURE — 80053 COMPREHEN METABOLIC PANEL: CPT

## 2022-10-21 PROCEDURE — 36415 COLL VENOUS BLD VENIPUNCTURE: CPT | Performed by: FAMILY MEDICINE

## 2022-10-21 NOTE — PROGRESS NOTES
Sintia Ian 1950 came in to have blood drawn. Name/ verified. Venipuncture performed on left arm. Pt tolerated it well.      Litzy Torres LPN

## 2022-10-28 ENCOUNTER — OFFICE VISIT (OUTPATIENT)
Dept: FAMILY MEDICINE CLINIC | Age: 72
End: 2022-10-28
Payer: MEDICARE

## 2022-10-28 VITALS
OXYGEN SATURATION: 96 % | BODY MASS INDEX: 23.77 KG/M2 | HEART RATE: 83 BPM | WEIGHT: 166 LBS | TEMPERATURE: 97 F | SYSTOLIC BLOOD PRESSURE: 148 MMHG | HEIGHT: 70 IN | DIASTOLIC BLOOD PRESSURE: 80 MMHG | RESPIRATION RATE: 14 BRPM

## 2022-10-28 VITALS
OXYGEN SATURATION: 96 % | HEIGHT: 70 IN | DIASTOLIC BLOOD PRESSURE: 80 MMHG | RESPIRATION RATE: 14 BRPM | TEMPERATURE: 97 F | WEIGHT: 166 LBS | HEART RATE: 80 BPM | SYSTOLIC BLOOD PRESSURE: 136 MMHG | BODY MASS INDEX: 23.77 KG/M2

## 2022-10-28 DIAGNOSIS — C61 PROSTATE CANCER (HCC): ICD-10-CM

## 2022-10-28 DIAGNOSIS — I10 ESSENTIAL HYPERTENSION: Primary | ICD-10-CM

## 2022-10-28 DIAGNOSIS — R74.01 ELEVATED LIVER TRANSAMINASE LEVEL: ICD-10-CM

## 2022-10-28 DIAGNOSIS — Z00.00 MEDICARE ANNUAL WELLNESS VISIT, SUBSEQUENT: Primary | ICD-10-CM

## 2022-10-28 DIAGNOSIS — M1A.9XX0 CHRONIC GOUT INVOLVING TOE OF RIGHT FOOT WITHOUT TOPHUS, UNSPECIFIED CAUSE: ICD-10-CM

## 2022-10-28 PROBLEM — G56.21 CUBITAL TUNNEL SYNDROME ON RIGHT: Status: RESOLVED | Noted: 2018-12-11 | Resolved: 2022-10-28

## 2022-10-28 PROCEDURE — G9717 DOC PT DX DEP/BP F/U NT REQ: HCPCS | Performed by: FAMILY MEDICINE

## 2022-10-28 PROCEDURE — 1123F ACP DISCUSS/DSCN MKR DOCD: CPT | Performed by: FAMILY MEDICINE

## 2022-10-28 PROCEDURE — G8427 DOCREV CUR MEDS BY ELIG CLIN: HCPCS | Performed by: FAMILY MEDICINE

## 2022-10-28 PROCEDURE — 99214 OFFICE O/P EST MOD 30 MIN: CPT | Performed by: FAMILY MEDICINE

## 2022-10-28 PROCEDURE — 1101F PT FALLS ASSESS-DOCD LE1/YR: CPT | Performed by: FAMILY MEDICINE

## 2022-10-28 PROCEDURE — 3078F DIAST BP <80 MM HG: CPT | Performed by: FAMILY MEDICINE

## 2022-10-28 PROCEDURE — G8752 SYS BP LESS 140: HCPCS | Performed by: FAMILY MEDICINE

## 2022-10-28 PROCEDURE — 3017F COLORECTAL CA SCREEN DOC REV: CPT | Performed by: FAMILY MEDICINE

## 2022-10-28 PROCEDURE — 3074F SYST BP LT 130 MM HG: CPT | Performed by: FAMILY MEDICINE

## 2022-10-28 PROCEDURE — G0439 PPPS, SUBSEQ VISIT: HCPCS | Performed by: FAMILY MEDICINE

## 2022-10-28 PROCEDURE — G8420 CALC BMI NORM PARAMETERS: HCPCS | Performed by: FAMILY MEDICINE

## 2022-10-28 PROCEDURE — G8536 NO DOC ELDER MAL SCRN: HCPCS | Performed by: FAMILY MEDICINE

## 2022-10-28 PROCEDURE — G8753 SYS BP > OR = 140: HCPCS | Performed by: FAMILY MEDICINE

## 2022-10-28 PROCEDURE — G8754 DIAS BP LESS 90: HCPCS | Performed by: FAMILY MEDICINE

## 2022-10-28 RX ORDER — ALLOPURINOL 100 MG/1
100 TABLET ORAL DAILY
Qty: 90 TABLET | Refills: 3 | Status: SHIPPED | OUTPATIENT
Start: 2022-10-28

## 2022-10-28 RX ORDER — INDOMETHACIN 50 MG/1
50 CAPSULE ORAL
Qty: 30 CAPSULE | Refills: 2 | Status: SHIPPED | OUTPATIENT
Start: 2022-10-28

## 2022-10-28 RX ORDER — TAMSULOSIN HYDROCHLORIDE 0.4 MG/1
CAPSULE ORAL
COMMUNITY
Start: 2022-08-22

## 2022-10-28 NOTE — ACP (ADVANCE CARE PLANNING)
Advance Care Planning     General Advance Care Planning (ACP) Conversation      Date of Conversation: 10/28/2022  Conducted with: Patient with Decision Making Capacity    Healthcare Decision Maker:     Primary Decision Maker: Garett Loaiza - Spouse - [phone]    Secondary Decision Maker: Jeanette Soriano - Son - [phone]  Click here to complete [address] including selection of the Healthcare Decision Maker Relationship (ie \"Primary\")    Care focused on alleviation of suffering at the end of life without life prolonging procedures.          Length of Voluntary ACP Conversation in minutes:  <16 minutes (Non-Billable)    Kristy Morris MD

## 2022-10-28 NOTE — PROGRESS NOTES
Chief Complaint   Patient presents with    Gout       Patient here today for chronic condition f/u and lab review, no concerns. 1. \"Have you been to the ER, urgent care clinic since your last visit? Hospitalized since your last visit? \" No    2. \"Have you seen or consulted any other health care providers outside of the [address] [place] [address] since your last visit? \"  Has seen Oncology for Proton therapy. 3. For patients aged 39-70: Has the patient had a colonoscopy / FIT/ Cologuard? Yes - no Care Gap present      If the patient is female:    4. For patients aged 41-77: Has the patient had a mammogram within the past 2 years? NA - based on age or sex      11. For patients aged 21-65: Has the patient had a pap smear?  NA - based on age or sex

## 2022-10-28 NOTE — ASSESSMENT & PLAN NOTE
Not correlating with allopurinol timeline. CT through [facility] EVERYWHERE 4/28/22 shows steatosis. Endorses \"it may\" be related to alcohol. Abstinence.  Reassess 4-6 weeks, labs prior

## 2022-10-28 NOTE — PROGRESS NOTES
This is the Subsequent Medicare Annual Wellness Exam, performed 12 months or more after the Initial AWV or the last Subsequent AWV    I have reviewed the patient's medical history in detail and updated the computerized patient record. Assessment/Plan   Education and counseling provided:  Patient says he completed ACP on line and does not have a paper copy. 1. Medicare annual wellness visit, subsequent     Depression Risk Factor Screening     3 most recent PHQ Screens 10/28/2022   Little interest or pleasure in doing things Not at all   Feeling down, depressed, irritable, or hopeless Not at all   Total Score PHQ 2 0   Trouble falling or staying asleep, or sleeping too much Not at all   Feeling tired or having little energy Not at all   Poor appetite, weight loss, or overeating Not at all   Feeling bad about yourself - or that you are a failure or have let yourself or your family down Not at all   Trouble concentrating on things such as school, work, reading, or watching TV Not at all   Moving or speaking so slowly that other people could have noticed; or the opposite being so fidgety that others notice Not at all   Thoughts of being better off dead, or hurting yourself in some way Not at all   PHQ 9 Score 0   How difficult have these problems made it for you to do your work, take care of your home and get along with others -       Alcohol & Drug Abuse Risk Screen    Do you average more than 1 drink per night or more than 7 drinks a week: No    In the past three months have you have had more than 4 drinks containing alcohol on one occasion: No          Functional Ability and Level of Safety    Hearing:  Patient does have some mild concerns      Activities of Daily Living: The home contains: grab bars  Patient does total self care      Ambulation: with no difficulty     Fall Risk:  Fall Risk Assessment, last 12 mths 10/28/2022   Able to walk? Yes   Fall in past 12 months? 0   Do you feel unsteady?  0   Are you worried about falling 0      Abuse Screen:  Patient is not abused       Cognitive Screening    Has your family/caregiver stated any concerns about your memory: yes - Patient does have memory concerns. \"It doesn't alarm me. \" Has to refer calendar more. Names come more slowly. Have to write things down. No different from his peers. Cognitive Screening: Normal - obs    Health Maintenance Due     Health Maintenance Due   Topic Date Due    Shingrix Vaccine Age 49> (1 of 2) Never done    DTaP/Tdap/Td series (2 - Td or Tdap) 09/09/2020    COVID-19 Vaccine (4 - Booster) 06/20/2021    Flu Vaccine (1) 08/01/2022       Patient Care Team   Patient Care Team:  Thaddeus Capone MD as PCP - General (Family Medicine)  Thaddeus Capone MD as PCP - [address] [place] [address] Provider  Thaddeus Capone MD (Family Medicine)  Jodri López MD (Orthopedic Surgery)  Luis Morales MD (Urology)  Elaine De Leon MD (Pulmonary Disease)  Rosanna Webb MD (Radiation Oncology)    History     Patient Active Problem List   Diagnosis Code    Vitamin D deficiency E55.9    Allergic rhinitis J30.9    Severe persistent asthma without complication W69.46    Lumbosacral radiculopathy due to degenerative joint disease of spine M47.27    Cervical radiculopathy due to degenerative joint disease of spine M47.22    Prostate cancer ([facility] [address] C61    BPH with obstruction/lower urinary tract symptoms N40.1, N13.8    Family history of prostate cancer Z80.42    Hyperlipidemia O14.0    Diastolic dysfunction L32.08    Essential hypertension I10    Other specified abnormal immunological findings in serum R76.8    Lumbar spondylosis M47.816    Depression F32. A    Gastroesophageal reflux disease without esophagitis K21.9    Intrinsic asthma with acute exacerbation, severe persistent J45.51    Elevated liver transaminase level R74.01    Chronic gout involving toe M1A. 9XX0    History of colon polyps Z86.010     Past Medical History:   Diagnosis Date Arthritis     Asthma     Cubital tunnel syndrome on right 12/11/2018    Depression     GERD (gastroesophageal reflux disease)     Prostate cancer ([facility] [address] 2/4/2015    Trouble in sleeping       Past Surgical History:   Procedure Laterality Date    HX COLONOSCOPY      HX ORCHIECTOMY  1999    left, varicocele with complications    HX OTHER SURGICAL      epidural injection     FL ABDOMEN SURGERY PROC UNLISTED      hernia repair 6/20/18     Current Outpatient Medications   Medication Sig Dispense Refill    tamsulosin (FLOMAX) 0.4 mg capsule       tiotropium bromide (SPIRIVA RESPIMAT) 2.5 mcg/actuation inhaler       fluticasone propion-salmeteroL (ADVAIR/WIXELA) 500-50 mcg/dose diskus inhaler Take 1 Puff by inhalation every twelve (12) hours. montelukast (SINGULAIR) 10 mg tablet Take 1 Tablet by mouth daily. 90 Tablet 0    levalbuterol tartrate (XOPENEX) 45 mcg/actuation inhaler Take 2 Puffs by inhalation every four (4) hours as needed for Wheezing. 1 Inhaler 4    cetirizine-pseudoePHEDrine (ZyrTEC-D) 5-120 mg Tb12 Take 1 Tab by mouth daily. 90 Tab 4    cycloSPORINE (RESTASIS) 0.05 % dpet Administer 1 Drop to both eyes every twelve (12) hours. Omeprazole delayed release (PRILOSEC D/R) 20 mg tablet Take 1 Tab by mouth daily. (Patient taking differently: Take 20 mg by mouth as needed.) 90 Tab 4    levalbuterol (XOPENEX) 1.25 mg/3 mL nebu 3 mL by Nebulization route every six (6) hours as needed. 1 Package 2    aspirin 81 mg chewable tablet Take 1 Tab by mouth daily. 90 Tab 4    albuterol (PROVENTIL VENTOLIN) 2.5 mg /3 mL (0.083 %) nebulizer solution 3 mL by Nebulization route every four (4) hours as needed for Wheezing. 1 Package 1    FA/MV-MN/MIN AA JANETT/SAW PAL (SAW PALMETO-MULTIVIT-MIN-AA-FA PO) Take 1 Tab by mouth daily. Cholecalciferol, Vitamin D3, (VITAMIN D) 2,000 unit Cap Take 1 Cap by mouth daily. 30 Cap 11    allopurinoL (ZYLOPRIM) 100 mg tablet Take 1 Tablet by mouth daily.  Start after completing the 100 mg dose tabs. 90 Tablet 3    indomethacin (INDOCIN) 50 mg capsule Take 1 Capsule by mouth three (3) times daily as needed for Gout. 30 Capsule 2    Cetirizine (ZyrTEC) 10 mg cap Zyrtec 10 mg capsule   Take every day by oral route. (Patient not taking: No sig reported)      epinephrine (EPIPEN 2-LUZ MARIA INJECTION)  (Patient not taking: Reported on 10/28/2022)       Allergies   Allergen Reactions    Latex Hives and Itching    Cashew Nut Other (comments)     \"UPSET STOMACH\"    Milk Diarrhea    Other Medication Other (comments)     Pt allergic to cats with respiratory, skin reactions. Pt allergic to cashews with upset stomach    Vicodin [Hydrocodone-Acetaminophen] Other (comments)     hallucinations       Family History   Problem Relation Age of Onset    Hypertension Mother     Heart Disease Mother     Arthritis-rheumatoid Mother     Hypertension Father     Heart Attack Father 48    Cancer Brother 72        prostate    Cancer Brother 61        prostate    Colon Cancer Brother     Heart Attack Brother 48    Cancer Brother 66        prostate    Diabetes Brother     Heart Attack Sister 61    Colon Polyps Sister     Heart Attack Brother 54    Heart Attack Sister 72     Social History     Tobacco Use    Smoking status: Never    Smokeless tobacco: Never   Substance Use Topics    Alcohol use:  Yes     Alcohol/week: 8.0 standard drinks     Types: 8 Cans of beer per week     Comment: occ

## 2022-10-28 NOTE — PROGRESS NOTES
Vipul Kaur (: 1950) is a 67 y.o. male, established patient, here for:    ASSESSMENT/PLAN:  1. Essential hypertension  Assessment & Plan:  Uncontrolled. No meds. May also normalize with abstinence from ETOH and resumption of prior healthy habits. Desires to avoid meds. Reassess 4-6 weeks with liver follow up   2. Elevated liver transaminase level  Assessment & Plan:  Not correlating with allopurinol timeline. CT through [facility] shows steatosis. Endorses \"it may\" be related to alcohol. Abstinence. Reassess 4-6 weeks, labs prior   Orders:  -     HEPATIC FUNCTION PANEL; Future  3. Prostate cancer [facility])  Assessment & Plan:  final proton tx for prostate cancer last week. First PSA post tx planned January, then 6 months. 4. Chronic gout involving toe of right foot without tophus, unspecified cause  Assessment & Plan:  Well controlled continue present management with allopurinol to 100 mg daily. Orders:  -     allopurinoL (ZYLOPRIM) 100 mg tablet; Take 1 Tablet by mouth daily. Start after completing the 100 mg dose tabs. , Normal, Disp-90 Tablet, R-3  -     indomethacin (INDOCIN) 50 mg capsule; Take 1 Capsule by mouth three (3) times daily as needed for Gout., Normal, Disp-30 Capsule, R-2    Return in about 6 weeks (around 2022) for liver enzymes and BP follow up, labs prior, (30). SUBJECTIVE/OBJECTIVE:  HPI  Interim - final proton tx for prostate cancer last week. No surg no chemo. First PSA post tx planned January, then 6 months. follow up gout - asymptomatic  follow up hypertension - not taking any rx. Has noted strong assn between therapeutic lifestyle interventions and bp    Episode of tachycardia 120-130 last week x hours. Resolved when woke the next day. Had proton tx and xolair the same day. 22 -   LIVER, BILIARY: Generalized hepatic low-attenuation compatible with steatosis. 6 mm hypodensity, likely small cyst within the posterior right hepatic lobe, size stable. No biliary dilation. Gallbladder is unremarkable. Results for orders placed or performed during the hospital encounter of 10/21/22   URIC ACID   Result Value Ref Range    Uric acid 5.2 2.6 - 7.2 MG/DL   METABOLIC PANEL, COMPREHENSIVE   Result Value Ref Range    Sodium 138 136 - 145 mmol/L    Potassium 4.1 3.5 - 5.5 mmol/L    Chloride 105 100 - 111 mmol/L    CO2 27 21 - 32 mmol/L    Anion gap 6 3.0 - 18 mmol/L    Glucose 103 (H) 74 - 99 mg/dL    BUN 12 7.0 - 18 MG/DL    Creatinine 1.17 0.6 - 1.3 MG/DL    BUN/Creatinine ratio 10 (L) 12 - 20      eGFR >60 >60 ml/min/1.73m2    Calcium 9.5 8.5 - 10.1 MG/DL    Bilirubin, total 0.8 0.2 - 1.0 MG/DL    ALT (SGPT) 76 (H) 16 - 61 U/L    AST (SGOT) 89 (H) 10 - 38 U/L    Alk. phosphatase 81 45 - 117 U/L    Protein, total 8.1 6.4 - 8.2 g/dL    Albumin 4.0 3.4 - 5.0 g/dL    Globulin 4.1 (H) 2.0 - 4.0 g/dL    A-G Ratio 1.0 0.8 - 1.7     CBC WITH AUTOMATED DIFF   Result Value Ref Range    WBC 2.8 (L) 4.6 - 13.2 K/uL    RBC 4.18 (L) 4.35 - 5.65 M/uL    HGB 13.1 13.0 - 16.0 g/dL    HCT 40.0 36.0 - 48.0 %    MCV 95.7 78.0 - 100.0 FL    MCH 31.3 24.0 - 34.0 PG    MCHC 32.8 31.0 - 37.0 g/dL    RDW 12.1 11.6 - 14.5 %    PLATELET 516 462 - 218 K/uL    MPV 10.9 9.2 - 11.8 FL    NRBC 0.0 0  WBC    ABSOLUTE NRBC 0.00 0.00 - 0.01 K/uL    NEUTROPHILS 35 (L) 40 - 73 %    LYMPHOCYTES 40 21 - 52 %    MONOCYTES 22 (H) 3 - 10 %    EOSINOPHILS 2 0 - 5 %    BASOPHILS 1 0 - 2 %    IMMATURE GRANULOCYTES 0 0.0 - 0.5 %    ABS. NEUTROPHILS 1.0 (L) 1.8 - 8.0 K/UL    ABS. LYMPHOCYTES 1.1 0.9 - 3.6 K/UL    ABS. MONOCYTES 0.6 0.05 - 1.2 K/UL    ABS. EOSINOPHILS 0.1 0.0 - 0.4 K/UL    ABS. BASOPHILS 0.0 0.0 - 0.1 K/UL    ABS. IMM. GRANS. 0.0 0.00 - 0.04 K/UL    DF AUTOMATED           Physical Exam  Constitutional:       General: He is not in acute distress. Appearance: He is well-developed. HENT:      Head: Normocephalic and atraumatic.    Pulmonary:      Effort: Pulmonary effort is normal. Neurological:      Mental Status: He is alert and oriented to person, place, and time. Psychiatric:         Behavior: Behavior normal.         Thought Content:  Thought content normal.         Judgment: Judgment normal.             -- Magdalene Palacio MD

## 2022-10-28 NOTE — ASSESSMENT & PLAN NOTE
Uncontrolled. No meds. May also normalize with abstinence from ETOH and resumption of prior healthy habits. Desires to avoid meds.  Reassess 4-6 weeks with liver follow up

## 2022-10-28 NOTE — PATIENT INSTRUCTIONS
Medicare Wellness Visit, Male    The best way to live healthy is to have a lifestyle where you eat a well-balanced diet, exercise regularly, limit alcohol use, and quit all forms of tobacco/nicotine, if applicable. Regular preventive services are another way to keep healthy. Preventive services (vaccines, screening tests, monitoring & exams) can help personalize your care plan, which helps you manage your own care. Screening tests can find health problems at the earliest stages, when they are easiest to treat. [facility] follows the current, evidence-based guidelines published by the [facility] [employer]enc[employer] Digna ([facility]) when recommending preventive services for our patients. Because we follow these guidelines, sometimes recommendations change over time as research supports it. (For example, a prostate screening blood test is no longer routinely recommended for men with no symptoms). Of course, you and your doctor may decide to screen more often for some diseases, based on your risk and co-morbidities (chronic disease you are already diagnosed with). Preventive services for you include:  - Medicare offers their members a free annual wellness visit, which is time for you and your primary care provider to discuss and plan for your preventive service needs. Take advantage of this benefit every year!  -All adults over age 72 should receive the recommended pneumonia vaccines. Current USPSTF guidelines recommend a series of two vaccines for the best pneumonia protection.   -All adults should have a flu vaccine yearly and tetanus vaccine every 10 years.  -All adults age 48 and older should receive the shingles vaccines (series of two vaccines).        -All adults age 38-68 who are overweight should have a diabetes screening test once every three years.   -Other screening tests & preventive services for persons with diabetes include: an eye exam to screen for diabetic retinopathy, a kidney function test, a foot exam, and stricter control over your cholesterol.   -Cardiovascular screening for adults with routine risk involves an electrocardiogram (ECG) at intervals determined by the provider.   -Colorectal cancer screening should be done for adults age 54-65 with no increased risk factors for colorectal cancer. There are a number of acceptable methods of screening for this type of cancer. Each test has its own benefits and drawbacks. Discuss with your provider what is most appropriate for you during your annual wellness visit. The different tests include: colonoscopy (considered the best screening method), a fecal occult blood test, a fecal DNA test, and sigmoidoscopy.  -All adults born between [facility] should be screened once for Hepatitis C.  -An Abdominal Aortic Aneurysm (AAA) Screening is recommended for men age 73-68 who has ever smoked in their lifetime. Here is a list of your current Health Maintenance items (your personalized list of preventive services) with a due date:  Health Maintenance Due   Topic Date Due    Shingles Vaccine (1 of 2) Never done    DTaP/Tdap/Td  (2 - Td or Tdap) 09/09/2020    COVID-19 Vaccine (4 - Booster) 06/20/2021    Yearly Flu Vaccine (1) 08/01/2022       Medicare Wellness Visit, Male    The best way to live healthy is to have a lifestyle where you eat a well-balanced diet, exercise regularly, limit alcohol use, and quit all forms of tobacco/nicotine, if applicable. Regular preventive services are another way to keep healthy. Preventive services (vaccines, screening tests, monitoring & exams) can help personalize your care plan, which helps you manage your own care. Screening tests can find health problems at the earliest stages, when they are easiest to treat.    [facility] follows the current, evidence-based guidelines published by the [facility] [employer]encio Digna (USPSTF) when recommending preventive services for our patients. Because we follow these guidelines, sometimes recommendations change over time as research supports it. (For example, a prostate screening blood test is no longer routinely recommended for men with no symptoms). Of course, you and your doctor may decide to screen more often for some diseases, based on your risk and co-morbidities (chronic disease you are already diagnosed with). Preventive services for you include:  - Medicare offers their members a free annual wellness visit, which is time for you and your primary care provider to discuss and plan for your preventive service needs. Take advantage of this benefit every year!  -All adults over age 72 should receive the recommended pneumonia vaccines. Current USPSTF guidelines recommend a series of two vaccines for the best pneumonia protection.   -All adults should have a flu vaccine yearly and tetanus vaccine every 10 years.  -All adults age 48 and older should receive the shingles vaccines (series of two vaccines). -All adults age 38-68 who are overweight should have a diabetes screening test once every three years.   -Other screening tests & preventive services for persons with diabetes include: an eye exam to screen for diabetic retinopathy, a kidney function test, a foot exam, and stricter control over your cholesterol.   -Cardiovascular screening for adults with routine risk involves an electrocardiogram (ECG) at intervals determined by the provider.   -Colorectal cancer screening should be done for adults age 54-65 with no increased risk factors for colorectal cancer. There are a number of acceptable methods of screening for this type of cancer. Each test has its own benefits and drawbacks. Discuss with your provider what is most appropriate for you during your annual wellness visit.  The different tests include: colonoscopy (considered the best screening method), a fecal occult blood test, a fecal DNA test, and sigmoidoscopy.  -All adults born between 1945 and 1965 should be screened once for Hepatitis C.  -An Abdominal Aortic Aneurysm (AAA) Screening is recommended for men age 73-68 who has ever smoked in their lifetime.      Here is a list of your current Health Maintenance items (your personalized list of preventive services) with a due date:  Health Maintenance Due   Topic Date Due    Shingles Vaccine (1 of 2) Never done    DTaP/Tdap/Td  (2 - Td or Tdap) 09/09/2020    COVID-19 Vaccine (4 - Booster) 06/20/2021    Yearly Flu Vaccine (1) 08/01/2022

## 2022-12-05 ENCOUNTER — CLINICAL SUPPORT (OUTPATIENT)
Dept: FAMILY MEDICINE CLINIC | Age: 72
End: 2022-12-05

## 2022-12-05 ENCOUNTER — HOSPITAL ENCOUNTER (OUTPATIENT)
Dept: LAB | Age: 72
Discharge: HOME | End: 2022-12-05
Payer: MEDICARE

## 2022-12-05 DIAGNOSIS — R74.01 ELEVATED LIVER TRANSAMINASE LEVEL: Primary | ICD-10-CM

## 2022-12-05 DIAGNOSIS — R74.01 ELEVATED LIVER TRANSAMINASE LEVEL: ICD-10-CM

## 2022-12-05 LAB
ALBUMIN SERPL-MCNC: 3.7 G/DL (ref 3.4–5)
ALBUMIN/GLOB SERPL: 0.9 {RATIO} (ref 0.8–1.7)
ALP SERPL-CCNC: 70 U/L (ref 45–117)
ALT SERPL-CCNC: 60 U/L (ref 16–61)
AST SERPL-CCNC: 53 U/L (ref 10–38)
BILIRUB DIRECT SERPL-MCNC: 0.2 MG/DL (ref 0–0.2)
BILIRUB SERPL-MCNC: 0.9 MG/DL (ref 0.2–1)
GLOBULIN SER CALC-MCNC: 3.9 G/DL (ref 2–4)
PROT SERPL-MCNC: 7.6 G/DL (ref 6.4–8.2)

## 2022-12-05 PROCEDURE — 80076 HEPATIC FUNCTION PANEL: CPT

## 2022-12-05 PROCEDURE — 36415 COLL VENOUS BLD VENIPUNCTURE: CPT

## 2022-12-05 NOTE — PROGRESS NOTES
Patient here today for NV lab draw, name and  verified venipuncture performed on patients left arm was successful patient tolerated well.

## 2022-12-09 ENCOUNTER — OFFICE VISIT (OUTPATIENT)
Dept: FAMILY MEDICINE CLINIC | Age: 72
End: 2022-12-09
Payer: MEDICARE

## 2022-12-09 VITALS
DIASTOLIC BLOOD PRESSURE: 70 MMHG | TEMPERATURE: 97.8 F | RESPIRATION RATE: 14 BRPM | BODY MASS INDEX: 23.62 KG/M2 | SYSTOLIC BLOOD PRESSURE: 142 MMHG | OXYGEN SATURATION: 96 % | WEIGHT: 165 LBS | HEART RATE: 98 BPM | HEIGHT: 70 IN

## 2022-12-09 DIAGNOSIS — I10 ESSENTIAL HYPERTENSION: ICD-10-CM

## 2022-12-09 DIAGNOSIS — R74.01 ELEVATED LIVER TRANSAMINASE LEVEL: Primary | ICD-10-CM

## 2022-12-09 DIAGNOSIS — J45.50 SEVERE PERSISTENT ASTHMA WITHOUT COMPLICATION: ICD-10-CM

## 2022-12-09 NOTE — PROGRESS NOTES
Ari Horta (: 1950) is a 67 y.o. male, established patient, here for:    ASSESSMENT/PLAN:  1. Elevated liver transaminase level  Assessment & Plan:  Near normalization of enzymes with ETOH moderation in past 6 weeks. Maintain 0-1/day. Reassess 3 months. Low threshold referral GI    Orders:  -     HEPATIC FUNCTION PANEL; Future  2. Essential hypertension  Assessment & Plan:  Decline in control, associated with tachycardia - timeline suggests due to seasonal worsening of asthma control. Has asthma follow up appointment next week. Revisit if it's determined NOT to be related to uncontrolled asthma. With anticipated resumption of amlodipine if that's the case. 3. Severe persistent asthma without complication  Assessment & Plan:  Uncontrolled but no indication to  pending appt next week. Continue Advair, Spiriva, albuterol     Return for elevated liver transaminases follow up. SUBJECTIVE/OBJECTIVE:  HPI    follow up elevated liver transaminases - post curtailing ETOH    Notes heart pounding x approx 3 weeks. Tachycardia noted in prep for PFTs. Improved with rest. Predated ETOH consumption change. Had been 2 standard ETOH/day, now 0-1/day    Experiencing seasonal decline in asthma control including having missed work x 1 day earlier this week    Has pulm visit next week    Results for orders placed or performed during the hospital encounter of 22   HEPATIC FUNCTION PANEL   Result Value Ref Range    Protein, total 7.6 6.4 - 8.2 g/dL    Albumin 3.7 3.4 - 5.0 g/dL    Globulin 3.9 2.0 - 4.0 g/dL    A-G Ratio 0.9 0.8 - 1.7      Bilirubin, total 0.9 0.2 - 1.0 MG/DL    Bilirubin, direct 0.2 0.0 - 0.2 MG/DL    Alk. phosphatase 70 45 - 117 U/L    AST (SGOT) 53 (H) 10 - 38 U/L    ALT (SGPT) 60 16 - 61 U/L         Physical Exam  Constitutional:       General: He is not in acute distress. Appearance: He is well-developed. HENT:      Head: Normocephalic and atraumatic.    Pulmonary: Effort: Pulmonary effort is normal.      Breath sounds: No wheezing, rhonchi or rales. Comments: Intermittent dry cough, triggered with deep breathing  Good entry but decreased movement all fields  Neurological:      Mental Status: He is alert and oriented to person, place, and time. Psychiatric:         Behavior: Behavior normal.         Thought Content:  Thought content normal.         Judgment: Judgment normal.             -- Camille Fay MD

## 2022-12-09 NOTE — ASSESSMENT & PLAN NOTE
Near normalization of enzymes with ETOH moderation in past 6 weeks. Maintain 0-1/day. Reassess 3 months.  Low threshold referral GI

## 2022-12-09 NOTE — ASSESSMENT & PLAN NOTE
Decline in control, associated with tachycardia - timeline suggests due to seasonal worsening of asthma control. Has asthma follow up appointment next week. Revisit if it's determined NOT to be related to uncontrolled asthma. With anticipated resumption of amlodipine if that's the case.

## 2022-12-09 NOTE — PROGRESS NOTES
Chief Complaint   Patient presents with    Abnormal liver tests     Patient here today for follow on his liver test. Patient says he would like to discuss his heart rate he says he can feel his heart beating in his chest.     1. \"Have you been to the ER, urgent care clinic since your last visit? Hospitalized since your last visit? \" No    2. \"Have you seen or consulted any other health care providers outside of the [address] [place] [address] since your last visit? \" No     3. For patients aged 39-70: Has the patient had a colonoscopy / FIT/ Cologuard? Yes - no Care Gap present      If the patient is female:    4. For patients aged 41-77: Has the patient had a mammogram within the past 2 years? NA - based on age or sex      11. For patients aged 21-65: Has the patient had a pap smear?  NA - based on age or sex

## 2022-12-12 ENCOUNTER — TELEPHONE (OUTPATIENT)
Dept: FAMILY MEDICINE CLINIC | Age: 72
End: 2022-12-12

## 2022-12-12 DIAGNOSIS — I51.89 DIASTOLIC DYSFUNCTION: ICD-10-CM

## 2022-12-12 DIAGNOSIS — I10 ESSENTIAL HYPERTENSION: ICD-10-CM

## 2022-12-12 DIAGNOSIS — R00.0 TACHYCARDIA: Primary | ICD-10-CM

## 2022-12-12 NOTE — TELEPHONE ENCOUNTER
Dr. Seble Koenig called the office stating she had just seen patient at her office today and had concerns with his elevated blood pressure as well as his elevated heart rate. She says she patient made her aware Dr. Fabiana Roy thought this may be being caused by patients asthma Dr. Seble Koenig says patients asthma is pretty well controlled and his PFT today had improved by 10 %. Dr. Seble Koenig has concerns that patients elevated blood pressure is due to a cardiovascular issue. She will send over her visit note for review and says Dr. Fabiana Roy can  call her on  her mobile number tomorrow to discuss if she wishes.  [phone]

## 2022-12-13 PROBLEM — R00.0 TACHYCARDIA: Status: ACTIVE | Noted: 2022-12-13

## 2022-12-13 NOTE — TELEPHONE ENCOUNTER
1. Tachycardia  Assessment & Plan:  Assessed by Dr. Emmett aSenz as not attributable to asthma control. Recent labs reassuring. Also with history of diastolic dysfunction. TSH, EKG, echo. Follow up in office. Orders:  -     TSH W/ REFLX FREE T4 IF ABNORMAL; Future  -     EKG, 12 LEAD, INITIAL; Future  -     ECHO ADULT COMPLETE; Future  2. Diastolic dysfunction  -     EKG, 12 LEAD, INITIAL; Future  -     ECHO ADULT COMPLETE; Future  3.  Essential hypertension    Ana Yanes MD

## 2022-12-13 NOTE — ASSESSMENT & PLAN NOTE
Assessed by Dr. Aishwarya Hamilton as not attributable to asthma control. Recent labs reassuring. Also with history of diastolic dysfunction. TSH, EKG, echo. Follow up in office.

## 2022-12-15 NOTE — TELEPHONE ENCOUNTER
Patient called the office back and has been made aware Dr. Bipin Torres had called the office about his blood pressure and Dr. Ting Ahuja has placed some orders for him to have some cardiac testing done as well as lab work. He has already been scheduled for the Echo on 1/6/23 and is planning to have his labs and EKG done at that time. He will call the office back once he has completed the testing to schedule a f/u with Dr. Ting Ahuja.

## 2022-12-15 NOTE — TELEPHONE ENCOUNTER
Called patient no answer left message asking that he call the office back as soon as he can, office number left.

## 2023-01-06 ENCOUNTER — HOSPITAL ENCOUNTER (OUTPATIENT)
Dept: NON INVASIVE DIAGNOSTICS | Age: 73
Discharge: HOME | End: 2023-01-06
Attending: FAMILY MEDICINE
Payer: MEDICARE

## 2023-01-06 VITALS — BODY MASS INDEX: 23.62 KG/M2 | HEIGHT: 70 IN | WEIGHT: 165 LBS

## 2023-01-06 DIAGNOSIS — I51.89 DIASTOLIC DYSFUNCTION: ICD-10-CM

## 2023-01-06 DIAGNOSIS — R00.0 TACHYCARDIA: ICD-10-CM

## 2023-01-06 LAB
ECHO AV AREA PEAK VELOCITY: 2.9 CM2
ECHO AV AREA VTI: 2.8 CM2
ECHO AV AREA/BSA PEAK VELOCITY: 1.5 CM2/M2
ECHO AV AREA/BSA VTI: 1.5 CM2/M2
ECHO AV MEAN GRADIENT: 2 MMHG
ECHO AV MEAN VELOCITY: 0.7 M/S
ECHO AV PEAK GRADIENT: 3 MMHG
ECHO AV PEAK VELOCITY: 0.9 M/S
ECHO AV VELOCITY RATIO: 0.89
ECHO AV VTI: 16.7 CM
ECHO IVC PROX: 1.5 CM
ECHO LA VOL 4C: 16 ML (ref 18–58)
ECHO LA VOLUME INDEX A4C: 8 ML/M2 (ref 16–34)
ECHO LV E' LATERAL VELOCITY: 8 CM/S
ECHO LV E' SEPTAL VELOCITY: 7 CM/S
ECHO LV EDV A4C: 57 ML
ECHO LV EDV INDEX A4C: 30 ML/M2
ECHO LV EJECTION FRACTION A4C: 61 %
ECHO LV ESV A4C: 22 ML
ECHO LV ESV INDEX A4C: 11 ML/M2
ECHO LV FRACTIONAL SHORTENING: 26 % (ref 28–44)
ECHO LV INTERNAL DIMENSION DIASTOLE INDEX: 1.77 CM/M2
ECHO LV INTERNAL DIMENSION DIASTOLIC: 3.4 CM (ref 4.2–5.9)
ECHO LV INTERNAL DIMENSION SYSTOLIC INDEX: 1.3 CM/M2
ECHO LV INTERNAL DIMENSION SYSTOLIC: 2.5 CM
ECHO LV IVSD: 0.8 CM (ref 0.6–1)
ECHO LV MASS 2D: 59.9 G (ref 88–224)
ECHO LV MASS INDEX 2D: 31.2 G/M2 (ref 49–115)
ECHO LV POSTERIOR WALL DIASTOLIC: 0.6 CM (ref 0.6–1)
ECHO LV RELATIVE WALL THICKNESS RATIO: 0.35
ECHO LVOT AREA: 3.1 CM2
ECHO LVOT AV VTI INDEX: 0.9
ECHO LVOT DIAM: 2 CM
ECHO LVOT MEAN GRADIENT: 2 MMHG
ECHO LVOT PEAK GRADIENT: 3 MMHG
ECHO LVOT PEAK VELOCITY: 0.8 M/S
ECHO LVOT STROKE VOLUME INDEX: 24.5 ML/M2
ECHO LVOT SV: 47.1 ML
ECHO LVOT VTI: 15 CM
ECHO MV A VELOCITY: 1.08 M/S
ECHO MV E DECELERATION TIME (DT): 195.7 MS
ECHO MV E VELOCITY: 0.74 M/S
ECHO MV E/A RATIO: 0.69
ECHO MV E/E' LATERAL: 9.25
ECHO MV E/E' RATIO (AVERAGED): 9.91
ECHO MV E/E' SEPTAL: 10.57
ECHO RV FREE WALL PEAK S': 13 CM/S
ECHO RV INTERNAL DIMENSION: 2.8 CM
ECHO RV TAPSE: 2.2 CM (ref 1.7–?)

## 2023-01-06 PROCEDURE — 93306 TTE W/DOPPLER COMPLETE: CPT

## 2023-02-04 DIAGNOSIS — R74.01 ELEVATED LIVER TRANSAMINASE LEVEL: Primary | ICD-10-CM

## 2023-03-06 ENCOUNTER — NURSE ONLY (OUTPATIENT)
Facility: CLINIC | Age: 73
End: 2023-03-06
Payer: MEDICARE

## 2023-03-06 ENCOUNTER — HOSPITAL ENCOUNTER (OUTPATIENT)
Facility: HOSPITAL | Age: 73
Setting detail: SPECIMEN
Discharge: HOME | End: 2023-03-09
Payer: MEDICARE

## 2023-03-06 DIAGNOSIS — R74.01 ELEVATED LIVER TRANSAMINASE LEVEL: ICD-10-CM

## 2023-03-06 DIAGNOSIS — R74.01 ELEVATION OF LEVELS OF LIVER TRANSAMINASE LEVELS: Primary | ICD-10-CM

## 2023-03-06 LAB
ALBUMIN SERPL-MCNC: 3.9 G/DL (ref 3.4–5)
ALBUMIN/GLOB SERPL: 1 (ref 0.8–1.7)
ALP SERPL-CCNC: 79 U/L (ref 45–117)
ALT SERPL-CCNC: 71 U/L (ref 16–61)
AST SERPL-CCNC: 73 U/L (ref 10–38)
BILIRUB DIRECT SERPL-MCNC: 0.2 MG/DL (ref 0–0.2)
BILIRUB SERPL-MCNC: 0.9 MG/DL (ref 0.2–1)
GLOBULIN SER CALC-MCNC: 4.1 G/DL (ref 2–4)
PROT SERPL-MCNC: 8 G/DL (ref 6.4–8.2)
TSH SERPL-ACNC: 1.66 UIU/ML (ref 0.36–3.74)

## 2023-03-06 PROCEDURE — 80076 HEPATIC FUNCTION PANEL: CPT

## 2023-03-06 PROCEDURE — 36415 COLL VENOUS BLD VENIPUNCTURE: CPT

## 2023-03-06 PROCEDURE — 84443 ASSAY THYROID STIM HORMONE: CPT

## 2023-03-06 PROCEDURE — 36415 COLL VENOUS BLD VENIPUNCTURE: CPT | Performed by: STUDENT IN AN ORGANIZED HEALTH CARE EDUCATION/TRAINING PROGRAM

## 2023-03-06 NOTE — PROGRESS NOTES
Encounter Diagnoses   Name Primary? Elevation of levels of liver transaminase levels Yes      Venipuncture to LFA without adverse reactions no bleeding/ bruising noted.  Patient tolerated well

## 2023-03-14 ENCOUNTER — OFFICE VISIT (OUTPATIENT)
Facility: CLINIC | Age: 73
End: 2023-03-14
Payer: MEDICARE

## 2023-03-14 VITALS
WEIGHT: 167 LBS | DIASTOLIC BLOOD PRESSURE: 78 MMHG | SYSTOLIC BLOOD PRESSURE: 128 MMHG | BODY MASS INDEX: 23.91 KG/M2 | RESPIRATION RATE: 14 BRPM | TEMPERATURE: 98.3 F | HEART RATE: 98 BPM | OXYGEN SATURATION: 98 % | HEIGHT: 70 IN

## 2023-03-14 DIAGNOSIS — K76.0 HEPATIC STEATOSIS: ICD-10-CM

## 2023-03-14 DIAGNOSIS — R74.01 ELEVATION OF LEVELS OF LIVER TRANSAMINASE LEVELS: Primary | ICD-10-CM

## 2023-03-14 DIAGNOSIS — Z11.59 ENCOUNTER FOR SCREENING FOR OTHER VIRAL DISEASES: ICD-10-CM

## 2023-03-14 PROCEDURE — G8427 DOCREV CUR MEDS BY ELIG CLIN: HCPCS | Performed by: FAMILY MEDICINE

## 2023-03-14 PROCEDURE — 3074F SYST BP LT 130 MM HG: CPT | Performed by: FAMILY MEDICINE

## 2023-03-14 PROCEDURE — 3078F DIAST BP <80 MM HG: CPT | Performed by: FAMILY MEDICINE

## 2023-03-14 PROCEDURE — 1123F ACP DISCUSS/DSCN MKR DOCD: CPT | Performed by: FAMILY MEDICINE

## 2023-03-14 PROCEDURE — G8484 FLU IMMUNIZE NO ADMIN: HCPCS | Performed by: FAMILY MEDICINE

## 2023-03-14 PROCEDURE — G8420 CALC BMI NORM PARAMETERS: HCPCS | Performed by: FAMILY MEDICINE

## 2023-03-14 PROCEDURE — 1036F TOBACCO NON-USER: CPT | Performed by: FAMILY MEDICINE

## 2023-03-14 PROCEDURE — 3017F COLORECTAL CA SCREEN DOC REV: CPT | Performed by: FAMILY MEDICINE

## 2023-03-14 PROCEDURE — 99214 OFFICE O/P EST MOD 30 MIN: CPT | Performed by: FAMILY MEDICINE

## 2023-03-14 SDOH — ECONOMIC STABILITY: INCOME INSECURITY: HOW HARD IS IT FOR YOU TO PAY FOR THE VERY BASICS LIKE FOOD, HOUSING, MEDICAL CARE, AND HEATING?: NOT HARD AT ALL

## 2023-03-14 SDOH — ECONOMIC STABILITY: HOUSING INSECURITY
IN THE LAST 12 MONTHS, WAS THERE A TIME WHEN YOU DID NOT HAVE A STEADY PLACE TO SLEEP OR SLEPT IN A SHELTER (INCLUDING NOW)?: NO

## 2023-03-14 SDOH — ECONOMIC STABILITY: FOOD INSECURITY: WITHIN THE PAST 12 MONTHS, THE FOOD YOU BOUGHT JUST DIDN'T LAST AND YOU DIDN'T HAVE MONEY TO GET MORE.: NEVER TRUE

## 2023-03-14 SDOH — ECONOMIC STABILITY: FOOD INSECURITY: WITHIN THE PAST 12 MONTHS, YOU WORRIED THAT YOUR FOOD WOULD RUN OUT BEFORE YOU GOT MONEY TO BUY MORE.: NEVER TRUE

## 2023-03-14 ASSESSMENT — PATIENT HEALTH QUESTIONNAIRE - PHQ9
5. POOR APPETITE OR OVEREATING: 0
8. MOVING OR SPEAKING SO SLOWLY THAT OTHER PEOPLE COULD HAVE NOTICED. OR THE OPPOSITE, BEING SO FIGETY OR RESTLESS THAT YOU HAVE BEEN MOVING AROUND A LOT MORE THAN USUAL: 0
2. FEELING DOWN, DEPRESSED OR HOPELESS: 0
4. FEELING TIRED OR HAVING LITTLE ENERGY: 0
7. TROUBLE CONCENTRATING ON THINGS, SUCH AS READING THE NEWSPAPER OR WATCHING TELEVISION: 0
9. THOUGHTS THAT YOU WOULD BE BETTER OFF DEAD, OR OF HURTING YOURSELF: 0
6. FEELING BAD ABOUT YOURSELF - OR THAT YOU ARE A FAILURE OR HAVE LET YOURSELF OR YOUR FAMILY DOWN: 0
SUM OF ALL RESPONSES TO PHQ QUESTIONS 1-9: 1
1. LITTLE INTEREST OR PLEASURE IN DOING THINGS: 0
10. IF YOU CHECKED OFF ANY PROBLEMS, HOW DIFFICULT HAVE THESE PROBLEMS MADE IT FOR YOU TO DO YOUR WORK, TAKE CARE OF THINGS AT HOME, OR GET ALONG WITH OTHER PEOPLE: 0
SUM OF ALL RESPONSES TO PHQ9 QUESTIONS 1 & 2: 0
SUM OF ALL RESPONSES TO PHQ QUESTIONS 1-9: 1
SUM OF ALL RESPONSES TO PHQ QUESTIONS 1-9: 1
3. TROUBLE FALLING OR STAYING ASLEEP: 1
SUM OF ALL RESPONSES TO PHQ QUESTIONS 1-9: 1

## 2023-03-14 NOTE — ASSESSMENT & PLAN NOTE
Transaminases again elevated. Chart review shows previous similar elevation 11/2020, prior to CT [facility] 4/28/2022 with steatosis and no mass. That sequence supports that current elevations unlikely to represent mass. Discussed options: referral for eval including potential biopsy as would be needed for definitive dx and assessment of fibrosis vs non invasive assessment without referral at the moment. Electing the latter. [facility] after his trip. Assess for viral hepatitis as well.
Home

## 2023-03-14 NOTE — PROGRESS NOTES
Michael Espinoza (: 1950) is a 72 y.o. male, established patient, here for:    ASSESSMENT/PLAN:  1. Elevation of levels of liver transaminase levels  Assessment & Plan:  Transaminases again elevated. Chart review shows previous similar elevation 2020, prior to CT [facility] 2022 with steatosis and no mass. That sequence supports that current elevations unlikely to represent mass. Discussed options: referral for eval including potential biopsy as would be needed for definitive dx and assessment of fibrosis vs non invasive assessment without referral at the moment. Electing the latter. US and fibroscan after his trip. Assess for viral hepatitis as well.     Orders:  -     US ORGAN ELASTOGRAPHY; Future  -     US GALLBLADDER RUQ; Future  -     Iron and TIBC; Future  -     Ferritin; Future  -     Hepatitis C Antibody; Future  -     Hepatitis B Surface Antibody; Future  -     Hepatitis B Surface Antigen; Future  -     Hepatitis B Core Antibody, IgM; Future  2. Hepatic steatosis  -     US ORGAN ELASTOGRAPHY; Future  -     US GALLBLADDER RUQ; Future  3. Encounter for screening for other viral diseases   -     Hepatitis B Surface Antibody; Future  -     Hepatitis B Surface Antigen; Future    Follow-up and Dispositions    Return for liver ultrasound and fibroscan follow up.            SUBJECTIVE/OBJECTIVE:  HPI  Retired January. Leaving tomorrow multistate trip to visit family  \"I feel great\"    follow up transaminases -   ETOH not daily  Never more than 3/day  Fewer than 14/week    CT [facility] 2022    LIVER, BILIARY: Generalized hepatic low-attenuation compatible with steatosis. 6 mm hypodensity, likely small cyst within the posterior right hepatic lobe, size stable. No biliary dilation. Gallbladder is unremarkable.     Hospital Outpatient Visit on 2023   Component Date Value Ref Range Status    Total Protein 2023 8.0  6.4 - 8.2 g/dL Final    Albumin 2023 3.9  3.4 - 5.0 g/dL Final    Globulin  03/06/2023 4.1 (A)  2.0 - 4.0 g/dL Final    Albumin/Globulin Ratio 03/06/2023 1.0  0.8 - 1.7   Final    Total Bilirubin 03/06/2023 0.9  0.2 - 1.0 MG/DL Final    Bilirubin, Direct 03/06/2023 0.2  0.0 - 0.2 MG/DL Final    Alk Phosphatase 03/06/2023 79  45 - 117 U/L Final    AST 03/06/2023 73 (A)  10 - 38 U/L Final    ALT 03/06/2023 71 (A)  16 - 61 U/L Final    TSH Schwertner Panel 03/06/2023 1.66  0.36 - 3.74 uIU/mL Final         Physical Exam  Constitutional:       General: He is not in acute distress. Appearance: Normal appearance. HENT:      Head: Normocephalic and atraumatic. Pulmonary:      Effort: Pulmonary effort is normal.   Neurological:      Mental Status: He is alert and oriented to person, place, and time.              -- Dmitriy Maier MD

## 2023-03-14 NOTE — PROGRESS NOTES
Chief Complaint   Patient presents with    Elevated Hepatic Enzymes     Patient here today to be seen for his follow up. No concerns. 1. \"Have you been to the ER, urgent care clinic since your last visit? Hospitalized since your last visit? \" No    2. \"Have you seen or consulted any other health care providers outside of the [address] [place] [address] since your last visit? \" No     3. For patients aged 39-70: Has the patient had a colonoscopy / FIT/ Cologuard? Yes - no Care Gap present      If the patient is female:    4. For patients aged 41-77: Has the patient had a mammogram within the past 2 years? NA - based on age or sex      11. For patients aged 21-65: Has the patient had a pap smear?  NA - based on age or sex

## 2023-03-29 ENCOUNTER — HOSPITAL ENCOUNTER (OUTPATIENT)
Facility: HOSPITAL | Age: 73
Discharge: HOME | End: 2023-04-01
Payer: MEDICARE

## 2023-03-29 LAB — PSA SERPL-MCNC: 6.6 NG/ML (ref 0–4)

## 2023-03-29 PROCEDURE — 36415 COLL VENOUS BLD VENIPUNCTURE: CPT

## 2023-03-29 PROCEDURE — 84153 ASSAY OF PSA TOTAL: CPT

## 2023-04-17 ENCOUNTER — HOSPITAL ENCOUNTER (OUTPATIENT)
Facility: HOSPITAL | Age: 73
Discharge: HOME | End: 2023-04-20
Payer: MEDICARE

## 2023-04-17 LAB
APPEARANCE UR: CLEAR
BACTERIA URNS QL MICRO: ABNORMAL /HPF
BILIRUB UR QL: NEGATIVE
COLOR UR: YELLOW
EPITH CASTS URNS QL MICRO: ABNORMAL /LPF (ref 0–5)
GLUCOSE UR STRIP.AUTO-MCNC: NEGATIVE MG/DL
HGB UR QL STRIP: NEGATIVE
KETONES UR QL STRIP.AUTO: NEGATIVE MG/DL
LEUKOCYTE ESTERASE UR QL STRIP.AUTO: NEGATIVE
NITRITE UR QL STRIP.AUTO: NEGATIVE
PH UR STRIP: 6 (ref 5–8)
PROT UR STRIP-MCNC: ABNORMAL MG/DL
RBC #/AREA URNS HPF: ABNORMAL /HPF (ref 0–5)
SP GR UR REFRACTOMETRY: 1.01 (ref 1–1.03)
UROBILINOGEN UR QL STRIP.AUTO: 0.2 EU/DL (ref 0.2–1)
WBC URNS QL MICRO: ABNORMAL /HPF (ref 0–4)

## 2023-04-17 PROCEDURE — 87086 URINE CULTURE/COLONY COUNT: CPT

## 2023-04-17 PROCEDURE — 81001 URINALYSIS AUTO W/SCOPE: CPT

## 2023-04-17 PROCEDURE — 36415 COLL VENOUS BLD VENIPUNCTURE: CPT

## 2023-04-18 LAB
BACTERIA SPEC CULT: NORMAL
SERVICE CMNT-IMP: NORMAL

## 2023-06-23 ENCOUNTER — OFFICE VISIT (OUTPATIENT)
Facility: CLINIC | Age: 73
End: 2023-06-23
Payer: MEDICARE

## 2023-06-23 VITALS
BODY MASS INDEX: 23.62 KG/M2 | HEIGHT: 70 IN | HEART RATE: 108 BPM | WEIGHT: 165 LBS | DIASTOLIC BLOOD PRESSURE: 78 MMHG | SYSTOLIC BLOOD PRESSURE: 120 MMHG | TEMPERATURE: 98.3 F

## 2023-06-23 DIAGNOSIS — J45.51 SEVERE PERSISTENT ASTHMA WITH ACUTE EXACERBATION: Primary | ICD-10-CM

## 2023-06-23 PROCEDURE — 1036F TOBACCO NON-USER: CPT | Performed by: FAMILY MEDICINE

## 2023-06-23 PROCEDURE — 3078F DIAST BP <80 MM HG: CPT | Performed by: FAMILY MEDICINE

## 2023-06-23 PROCEDURE — 3017F COLORECTAL CA SCREEN DOC REV: CPT | Performed by: FAMILY MEDICINE

## 2023-06-23 PROCEDURE — G8420 CALC BMI NORM PARAMETERS: HCPCS | Performed by: FAMILY MEDICINE

## 2023-06-23 PROCEDURE — 99214 OFFICE O/P EST MOD 30 MIN: CPT | Performed by: FAMILY MEDICINE

## 2023-06-23 PROCEDURE — G8427 DOCREV CUR MEDS BY ELIG CLIN: HCPCS | Performed by: FAMILY MEDICINE

## 2023-06-23 PROCEDURE — 3074F SYST BP LT 130 MM HG: CPT | Performed by: FAMILY MEDICINE

## 2023-06-23 PROCEDURE — 1123F ACP DISCUSS/DSCN MKR DOCD: CPT | Performed by: FAMILY MEDICINE

## 2023-06-23 RX ORDER — LEVALBUTEROL TARTRATE 45 UG/1
2 AEROSOL, METERED ORAL EVERY 4 HOURS PRN
Qty: 2 EACH | Refills: 1 | Status: SHIPPED | OUTPATIENT
Start: 2023-06-23

## 2023-06-23 RX ORDER — METHYLPREDNISOLONE 4 MG/1
TABLET ORAL
Qty: 1 KIT | Refills: 0 | Status: SHIPPED | OUTPATIENT
Start: 2023-06-23 | End: 2023-06-29

## 2023-06-23 ASSESSMENT — PATIENT HEALTH QUESTIONNAIRE - PHQ9
3. TROUBLE FALLING OR STAYING ASLEEP: 0
SUM OF ALL RESPONSES TO PHQ9 QUESTIONS 1 & 2: 0
2. FEELING DOWN, DEPRESSED OR HOPELESS: 0
SUM OF ALL RESPONSES TO PHQ QUESTIONS 1-9: 0
1. LITTLE INTEREST OR PLEASURE IN DOING THINGS: 0
9. THOUGHTS THAT YOU WOULD BE BETTER OFF DEAD, OR OF HURTING YOURSELF: 0
5. POOR APPETITE OR OVEREATING: 0
7. TROUBLE CONCENTRATING ON THINGS, SUCH AS READING THE NEWSPAPER OR WATCHING TELEVISION: 0
SUM OF ALL RESPONSES TO PHQ QUESTIONS 1-9: 0
4. FEELING TIRED OR HAVING LITTLE ENERGY: 0
8. MOVING OR SPEAKING SO SLOWLY THAT OTHER PEOPLE COULD HAVE NOTICED. OR THE OPPOSITE, BEING SO FIGETY OR RESTLESS THAT YOU HAVE BEEN MOVING AROUND A LOT MORE THAN USUAL: 0
SUM OF ALL RESPONSES TO PHQ QUESTIONS 1-9: 0
6. FEELING BAD ABOUT YOURSELF - OR THAT YOU ARE A FAILURE OR HAVE LET YOURSELF OR YOUR FAMILY DOWN: 0
SUM OF ALL RESPONSES TO PHQ QUESTIONS 1-9: 0
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
## 2023-06-23 NOTE — PROGRESS NOTES
Chief Complaint   Patient presents with    Asthma     Patient here today with c/o breathing issues. He says his breathing has not improved and he has had to use his nebulizer everyday this week, sometimes twice a day. He is not able to see Pulmonary until Aug.     1. \"Have you been to the ER, urgent care clinic since your last visit? Hospitalized since your last visit? \" No    2. \"Have you seen or consulted any other health care providers outside of the [address] [place] [address] since your last visit? \" No     3. For patients aged 39-70: Has the patient had a colonoscopy / FIT/ Cologuard? Yes - no Care Gap present      If the patient is female:    4. For patients aged 41-77: Has the patient had a mammogram within the past 2 years? NA - based on age or sex      11. For patients aged 21-65: Has the patient had a pap smear?  NA - based on age or sex

## 2023-07-17 ENCOUNTER — HOSPITAL ENCOUNTER (OUTPATIENT)
Facility: HOSPITAL | Age: 73
Discharge: HOME | End: 2023-07-20
Payer: MEDICARE

## 2023-07-17 LAB — PSA SERPL-MCNC: 9.9 NG/ML (ref 0–4)

## 2023-07-17 PROCEDURE — 36415 COLL VENOUS BLD VENIPUNCTURE: CPT

## 2023-07-17 PROCEDURE — 84153 ASSAY OF PSA TOTAL: CPT

## 2023-07-28 ENCOUNTER — OFFICE VISIT (OUTPATIENT)
Facility: CLINIC | Age: 73
End: 2023-07-28

## 2023-07-28 VITALS
WEIGHT: 168 LBS | HEART RATE: 89 BPM | DIASTOLIC BLOOD PRESSURE: 80 MMHG | TEMPERATURE: 97.2 F | HEIGHT: 70 IN | SYSTOLIC BLOOD PRESSURE: 140 MMHG | BODY MASS INDEX: 24.05 KG/M2 | OXYGEN SATURATION: 98 %

## 2023-07-28 DIAGNOSIS — H91.93 BILATERAL HEARING LOSS, UNSPECIFIED HEARING LOSS TYPE: ICD-10-CM

## 2023-07-28 DIAGNOSIS — R03.0 ELEVATED BP WITHOUT DIAGNOSIS OF HYPERTENSION: ICD-10-CM

## 2023-07-28 DIAGNOSIS — H93.13 TINNITUS OF BOTH EARS: Primary | ICD-10-CM

## 2023-07-28 RX ORDER — FLUTICASONE PROPIONATE AND SALMETEROL 100; 50 UG/1; UG/1
1 POWDER RESPIRATORY (INHALATION) EVERY 12 HOURS
COMMUNITY

## 2023-07-28 SDOH — ECONOMIC STABILITY: FOOD INSECURITY: WITHIN THE PAST 12 MONTHS, THE FOOD YOU BOUGHT JUST DIDN'T LAST AND YOU DIDN'T HAVE MONEY TO GET MORE.: NEVER TRUE

## 2023-07-28 SDOH — ECONOMIC STABILITY: INCOME INSECURITY: HOW HARD IS IT FOR YOU TO PAY FOR THE VERY BASICS LIKE FOOD, HOUSING, MEDICAL CARE, AND HEATING?: NOT HARD AT ALL

## 2023-07-28 SDOH — ECONOMIC STABILITY: FOOD INSECURITY: WITHIN THE PAST 12 MONTHS, YOU WORRIED THAT YOUR FOOD WOULD RUN OUT BEFORE YOU GOT MONEY TO BUY MORE.: NEVER TRUE

## 2023-07-28 ASSESSMENT — PATIENT HEALTH QUESTIONNAIRE - PHQ9
SUM OF ALL RESPONSES TO PHQ QUESTIONS 1-9: 3
9. THOUGHTS THAT YOU WOULD BE BETTER OFF DEAD, OR OF HURTING YOURSELF: 0
4. FEELING TIRED OR HAVING LITTLE ENERGY: 1
8. MOVING OR SPEAKING SO SLOWLY THAT OTHER PEOPLE COULD HAVE NOTICED. OR THE OPPOSITE, BEING SO FIGETY OR RESTLESS THAT YOU HAVE BEEN MOVING AROUND A LOT MORE THAN USUAL: 0
10. IF YOU CHECKED OFF ANY PROBLEMS, HOW DIFFICULT HAVE THESE PROBLEMS MADE IT FOR YOU TO DO YOUR WORK, TAKE CARE OF THINGS AT HOME, OR GET ALONG WITH OTHER PEOPLE: 0
SUM OF ALL RESPONSES TO PHQ9 QUESTIONS 1 & 2: 0
7. TROUBLE CONCENTRATING ON THINGS, SUCH AS READING THE NEWSPAPER OR WATCHING TELEVISION: 0
3. TROUBLE FALLING OR STAYING ASLEEP: 2
6. FEELING BAD ABOUT YOURSELF - OR THAT YOU ARE A FAILURE OR HAVE LET YOURSELF OR YOUR FAMILY DOWN: 0
SUM OF ALL RESPONSES TO PHQ QUESTIONS 1-9: 3
5. POOR APPETITE OR OVEREATING: 0
1. LITTLE INTEREST OR PLEASURE IN DOING THINGS: 0
2. FEELING DOWN, DEPRESSED OR HOPELESS: 0
SUM OF ALL RESPONSES TO PHQ QUESTIONS 1-9: 3
SUM OF ALL RESPONSES TO PHQ QUESTIONS 1-9: 3

## 2023-07-28 NOTE — PROGRESS NOTES
Raad Pastor (: 1950) is a 67 y.o. male, established patient, here for:    ASSESSMENT/PLAN:  1. Tinnitus of both ears  Assessment & Plan:  Most likely due to hearing loss. Med side effect considered unlikely as no new agents NSAIDs and PPI are not daily. Discontinuing ASA as not indicated for primary prevention. Doubtful it will address tinnitus. Explained limited treatment options but can explore with an ENT colleague. Referring  Orders:  -     Amb External Referral To ENT  2. Bilateral hearing loss, unspecified hearing loss type  -     Amb External Referral To ENT  3. Elevated BP without diagnosis of hypertension  Assessment & Plan:  Home pressures. Short interval follow up with nurse visit BP check       Follow-up and Dispositions    Return if symptoms worsen or fail to improve, for 2 weeks for nurse BP check. SUBJECTIVE/OBJECTIVE:  HPI    Constant high pitched ringing x 1-2 months. Unchanged since onset. Endorses decline in hearing in the past year  No pain, pressure, dizziness    No new meds  Indomethacin 1-2/month  Omeprazole 2/week               Physical Exam  Constitutional:       General: He is not in acute distress. Appearance: Normal appearance. HENT:      Head: Normocephalic and atraumatic. Right Ear: Ear canal and external ear normal.      Left Ear: Tympanic membrane, ear canal and external ear normal.      Ears:      Comments: Right TM partially obscured by cerumen. Visible portion normal   Pulmonary:      Effort: Pulmonary effort is normal.   Neurological:      Mental Status: He is alert and oriented to person, place, and time.              -- Tiana Campbell MD

## 2023-07-28 NOTE — ASSESSMENT & PLAN NOTE
Most likely due to hearing loss. Med side effect considered unlikely as no new agents NSAIDs and PPI are not daily. Discontinuing ASA as not indicated for primary prevention. Doubtful it will address tinnitus.

## 2023-10-28 DIAGNOSIS — M1A.0790 CHRONIC IDIOPATHIC GOUT INVOLVING TOE WITHOUT TOPHUS, UNSPECIFIED LATERALITY: Primary | ICD-10-CM

## 2023-10-31 RX ORDER — INDOMETHACIN 50 MG/1
CAPSULE ORAL
Qty: 30 CAPSULE | Refills: 1 | Status: SHIPPED | OUTPATIENT
Start: 2023-10-31

## 2023-10-31 NOTE — TELEPHONE ENCOUNTER
Patient was last seen for chronic conditions on 12/9/2022 and is due for a follow up called patient and scheduled follow up on 11/10. He will need refills sent in before his appt.

## 2023-11-08 DIAGNOSIS — M1A.0790 CHRONIC IDIOPATHIC GOUT INVOLVING TOE WITHOUT TOPHUS, UNSPECIFIED LATERALITY: Primary | ICD-10-CM

## 2023-11-08 RX ORDER — ALLOPURINOL 100 MG/1
TABLET ORAL
Qty: 90 TABLET | Refills: 0 | Status: SHIPPED | OUTPATIENT
Start: 2023-11-08

## 2023-11-08 NOTE — TELEPHONE ENCOUNTER
Patient has follow up scheduled on 11/10 and will need his medication sent in before his appt. Please review and sign if appropriate.

## 2023-11-10 ENCOUNTER — HOSPITAL ENCOUNTER (OUTPATIENT)
Facility: HOSPITAL | Age: 73
Setting detail: SPECIMEN
End: 2023-11-10
Payer: MEDICARE

## 2023-11-10 ENCOUNTER — OFFICE VISIT (OUTPATIENT)
Facility: CLINIC | Age: 73
End: 2023-11-10

## 2023-11-10 VITALS
OXYGEN SATURATION: 99 % | HEART RATE: 78 BPM | BODY MASS INDEX: 23.82 KG/M2 | SYSTOLIC BLOOD PRESSURE: 124 MMHG | WEIGHT: 166.4 LBS | DIASTOLIC BLOOD PRESSURE: 68 MMHG | TEMPERATURE: 97.6 F | HEIGHT: 70 IN

## 2023-11-10 VITALS
TEMPERATURE: 97.6 F | HEART RATE: 78 BPM | OXYGEN SATURATION: 99 % | WEIGHT: 166.4 LBS | DIASTOLIC BLOOD PRESSURE: 68 MMHG | SYSTOLIC BLOOD PRESSURE: 124 MMHG | HEIGHT: 70 IN | BODY MASS INDEX: 23.82 KG/M2

## 2023-11-10 DIAGNOSIS — M1A.0790 CHRONIC IDIOPATHIC GOUT INVOLVING TOE WITHOUT TOPHUS, UNSPECIFIED LATERALITY: ICD-10-CM

## 2023-11-10 DIAGNOSIS — E55.9 VITAMIN D DEFICIENCY: ICD-10-CM

## 2023-11-10 DIAGNOSIS — Z00.00 MEDICARE ANNUAL WELLNESS VISIT, SUBSEQUENT: Primary | ICD-10-CM

## 2023-11-10 DIAGNOSIS — M1A.0790 CHRONIC IDIOPATHIC GOUT INVOLVING TOE WITHOUT TOPHUS, UNSPECIFIED LATERALITY: Primary | ICD-10-CM

## 2023-11-10 PROBLEM — R03.0 ELEVATED BP WITHOUT DIAGNOSIS OF HYPERTENSION: Status: RESOLVED | Noted: 2023-07-28 | Resolved: 2023-11-10

## 2023-11-10 LAB
ALBUMIN SERPL-MCNC: 3.8 G/DL (ref 3.4–5)
ALBUMIN/GLOB SERPL: 1 (ref 0.8–1.7)
ALP SERPL-CCNC: 75 U/L (ref 45–117)
ALT SERPL-CCNC: 59 U/L (ref 16–61)
ANION GAP SERPL CALC-SCNC: 6 MMOL/L (ref 3–18)
AST SERPL-CCNC: 39 U/L (ref 10–38)
BILIRUB SERPL-MCNC: 0.5 MG/DL (ref 0.2–1)
BUN SERPL-MCNC: 14 MG/DL (ref 7–18)
BUN/CREAT SERPL: 11 (ref 12–20)
CALCIUM SERPL-MCNC: 9.7 MG/DL (ref 8.5–10.1)
CHLORIDE SERPL-SCNC: 106 MMOL/L (ref 100–111)
CO2 SERPL-SCNC: 27 MMOL/L (ref 21–32)
CREAT SERPL-MCNC: 1.23 MG/DL (ref 0.6–1.3)
GLOBULIN SER CALC-MCNC: 3.7 G/DL (ref 2–4)
GLUCOSE SERPL-MCNC: 102 MG/DL (ref 74–99)
POTASSIUM SERPL-SCNC: 4.8 MMOL/L (ref 3.5–5.5)
PROT SERPL-MCNC: 7.5 G/DL (ref 6.4–8.2)
SODIUM SERPL-SCNC: 139 MMOL/L (ref 136–145)
URATE SERPL-MCNC: 5.5 MG/DL (ref 2.6–7.2)

## 2023-11-10 PROCEDURE — 36415 COLL VENOUS BLD VENIPUNCTURE: CPT

## 2023-11-10 PROCEDURE — 80053 COMPREHEN METABOLIC PANEL: CPT

## 2023-11-10 PROCEDURE — 84550 ASSAY OF BLOOD/URIC ACID: CPT

## 2023-11-10 ASSESSMENT — PATIENT HEALTH QUESTIONNAIRE - PHQ9
SUM OF ALL RESPONSES TO PHQ QUESTIONS 1-9: 0
SUM OF ALL RESPONSES TO PHQ QUESTIONS 1-9: 0
3. TROUBLE FALLING OR STAYING ASLEEP: 0
SUM OF ALL RESPONSES TO PHQ QUESTIONS 1-9: 0
7. TROUBLE CONCENTRATING ON THINGS, SUCH AS READING THE NEWSPAPER OR WATCHING TELEVISION: 0
4. FEELING TIRED OR HAVING LITTLE ENERGY: 0
SUM OF ALL RESPONSES TO PHQ9 QUESTIONS 1 & 2: 0
8. MOVING OR SPEAKING SO SLOWLY THAT OTHER PEOPLE COULD HAVE NOTICED. OR THE OPPOSITE, BEING SO FIGETY OR RESTLESS THAT YOU HAVE BEEN MOVING AROUND A LOT MORE THAN USUAL: 0
9. THOUGHTS THAT YOU WOULD BE BETTER OFF DEAD, OR OF HURTING YOURSELF: 0
1. LITTLE INTEREST OR PLEASURE IN DOING THINGS: 0
2. FEELING DOWN, DEPRESSED OR HOPELESS: 0
10. IF YOU CHECKED OFF ANY PROBLEMS, HOW DIFFICULT HAVE THESE PROBLEMS MADE IT FOR YOU TO DO YOUR WORK, TAKE CARE OF THINGS AT HOME, OR GET ALONG WITH OTHER PEOPLE: 0
SUM OF ALL RESPONSES TO PHQ QUESTIONS 1-9: 0
6. FEELING BAD ABOUT YOURSELF - OR THAT YOU ARE A FAILURE OR HAVE LET YOURSELF OR YOUR FAMILY DOWN: 0
5. POOR APPETITE OR OVEREATING: 0

## 2023-11-10 ASSESSMENT — COLUMBIA-SUICIDE SEVERITY RATING SCALE - C-SSRS
7. DID THIS OCCUR IN THE LAST THREE MONTHS: NO
4. HAVE YOU HAD THESE THOUGHTS AND HAD SOME INTENTION OF ACTING ON THEM?: NO
5. HAVE YOU STARTED TO WORK OUT OR WORKED OUT THE DETAILS OF HOW TO KILL YOURSELF? DO YOU INTEND TO CARRY OUT THIS PLAN?: NO
3. HAVE YOU BEEN THINKING ABOUT HOW YOU MIGHT KILL YOURSELF?: NO

## 2023-11-10 ASSESSMENT — LIFESTYLE VARIABLES
HOW OFTEN DO YOU HAVE A DRINK CONTAINING ALCOHOL: 2-3 TIMES A WEEK
HOW MANY STANDARD DRINKS CONTAINING ALCOHOL DO YOU HAVE ON A TYPICAL DAY: 1 OR 2

## 2023-11-10 NOTE — ACP (ADVANCE CARE PLANNING)
Advance Care Planning     General Advance Care Planning (ACP) Conversation    Date of Conversation: 11/10/2023  Conducted with: Patient with Decision Making Capacity    Healthcare Decision Maker:    Primary Decision Maker: Francois - Spouse - [phone]    Secondary Decision Maker: Kimi Stover - Child - [phone]  Click here to complete Healthcare Decision Makers including selection of the Healthcare Decision Maker Relationship (ie \"Primary\"). Care focused on alleviation of suffering at the end of life without life prolonging procedures. Content/Action Overview:   Has ACP document(s) NOT on file - requested patient to provide  Reviewed DNR/DNI and patient   Length of Voluntary ACP Conversation in minutes:  <16 minutes (Non-Billable)    Peña Fisher MD

## 2023-11-10 NOTE — PROGRESS NOTES
Alyson Daniel (: 1950) is a 75 y.o. male, established patient, here for:    ASSESSMENT/PLAN:  1. Chronic idiopathic gout involving toe without tophus, unspecified laterality  Assessment & Plan:  Well-controlled. Continue present management with allopurinol 100 mg daily pending review of labs. Orders:  -     Uric Acid; Future  -     Comprehensive Metabolic Panel; Future      Follow-up and Dispositions    Return in about 6 months (around 5/10/2024) for chronic medical conditions, (30), labs 1 week prior, subject to change based on results. Next: lipids, uric acid, cbc, mag, b12    SUBJECTIVE/OBJECTIVE:  Doing well. New puppy  Scheduled to speak at [facility] tomorrow    Follow-up elevated blood pressure without diagnosis of hypertension  Follow-up gout-asymptomatic with allopurinol suppression    Lab Results   Component Value Date     10/21/2022    K 4.1 10/21/2022     10/21/2022    CO2 27 10/21/2022    BUN 12 10/21/2022    CREATININE 1.17 10/21/2022    GLUCOSE 103 (H) 10/21/2022    CALCIUM 9.5 10/21/2022    PROT 8.0 2023    LABALBU 3.9 2023    BILITOT 0.9 2023    ALKPHOS 79 2023    AST 73 (H) 2023    ALT 71 (H) 2023    GFRAA >60 2022    AGRATIO 0.9 2022    GLOB 4.1 (H) 2023       Lab Results   Component Value Date    LABURIC 5.2 10/21/2022             Physical Exam  Constitutional:       General: He is not in acute distress. Appearance: Normal appearance. HENT:      Head: Normocephalic and atraumatic. Pulmonary:      Effort: Pulmonary effort is normal.   Neurological:      Mental Status: He is alert and oriented to person, place, and time.                -- Shant Duckworth MD

## 2024-05-01 ENCOUNTER — HOSPITAL ENCOUNTER (OUTPATIENT)
Facility: HOSPITAL | Age: 74
Setting detail: SPECIMEN
Discharge: HOME | End: 2024-05-04
Payer: MEDICARE

## 2024-05-01 ENCOUNTER — NURSE ONLY (OUTPATIENT)
Facility: CLINIC | Age: 74
End: 2024-05-01
Payer: MEDICARE

## 2024-05-01 DIAGNOSIS — M1A.0790 CHRONIC IDIOPATHIC GOUT INVOLVING TOE WITHOUT TOPHUS, UNSPECIFIED LATERALITY: Primary | ICD-10-CM

## 2024-05-01 DIAGNOSIS — Z51.81 ENCOUNTER FOR MEDICATION MONITORING: ICD-10-CM

## 2024-05-01 DIAGNOSIS — E78.5 HYPERLIPIDEMIA, UNSPECIFIED HYPERLIPIDEMIA TYPE: ICD-10-CM

## 2024-05-01 DIAGNOSIS — M1A.0790 CHRONIC IDIOPATHIC GOUT INVOLVING TOE WITHOUT TOPHUS, UNSPECIFIED LATERALITY: ICD-10-CM

## 2024-05-01 LAB
ALBUMIN SERPL-MCNC: 3.8 G/DL (ref 3.4–5)
ALBUMIN/GLOB SERPL: 1 (ref 0.8–1.7)
ALP SERPL-CCNC: 72 U/L (ref 45–117)
ALT SERPL-CCNC: 119 U/L (ref 16–61)
ANION GAP SERPL CALC-SCNC: 7 MMOL/L (ref 3–18)
AST SERPL-CCNC: 130 U/L (ref 10–38)
BASOPHILS # BLD: 0.1 K/UL (ref 0–0.1)
BASOPHILS NFR BLD: 2 % (ref 0–2)
BILIRUB SERPL-MCNC: 0.9 MG/DL (ref 0.2–1)
BUN SERPL-MCNC: 15 MG/DL (ref 7–18)
BUN/CREAT SERPL: 12 (ref 12–20)
CALCIUM SERPL-MCNC: 9 MG/DL (ref 8.5–10.1)
CHLORIDE SERPL-SCNC: 99 MMOL/L (ref 100–111)
CHOLEST SERPL-MCNC: 186 MG/DL
CO2 SERPL-SCNC: 28 MMOL/L (ref 21–32)
CREAT SERPL-MCNC: 1.28 MG/DL (ref 0.6–1.3)
DIFFERENTIAL METHOD BLD: ABNORMAL
EOSINOPHIL # BLD: 0.1 K/UL (ref 0–0.4)
EOSINOPHIL NFR BLD: 2 % (ref 0–5)
ERYTHROCYTE [DISTWIDTH] IN BLOOD BY AUTOMATED COUNT: 12.2 % (ref 11.6–14.5)
GLOBULIN SER CALC-MCNC: 3.9 G/DL (ref 2–4)
GLUCOSE SERPL-MCNC: 89 MG/DL (ref 74–99)
HCT VFR BLD AUTO: 39.2 % (ref 36–48)
HDLC SERPL-MCNC: 73 MG/DL (ref 40–60)
HDLC SERPL: 2.5 (ref 0–5)
HGB BLD-MCNC: 12.8 G/DL (ref 13–16)
IMM GRANULOCYTES # BLD AUTO: 0 K/UL (ref 0–0.04)
IMM GRANULOCYTES NFR BLD AUTO: 0 % (ref 0–0.5)
LDLC SERPL CALC-MCNC: 90.4 MG/DL (ref 0–100)
LIPID PANEL: ABNORMAL
LYMPHOCYTES # BLD: 1.1 K/UL (ref 0.9–3.6)
LYMPHOCYTES NFR BLD: 37 % (ref 21–52)
MAGNESIUM SERPL-MCNC: 2.2 MG/DL (ref 1.6–2.6)
MCH RBC QN AUTO: 31.7 PG (ref 24–34)
MCHC RBC AUTO-ENTMCNC: 32.7 G/DL (ref 31–37)
MCV RBC AUTO: 97 FL (ref 78–100)
MONOCYTES # BLD: 0.6 K/UL (ref 0.05–1.2)
MONOCYTES NFR BLD: 21 % (ref 3–10)
NEUTS SEG # BLD: 1 K/UL (ref 1.8–8)
NEUTS SEG NFR BLD: 33 % (ref 40–73)
NRBC # BLD: 0 K/UL (ref 0–0.01)
NRBC BLD-RTO: 0 PER 100 WBC
OTHER CELLS NFR BLD MANUAL: 5
PLATELET # BLD AUTO: 169 K/UL (ref 135–420)
PLATELET COMMENT: ABNORMAL
PMV BLD AUTO: 10.8 FL (ref 9.2–11.8)
POTASSIUM SERPL-SCNC: 4.4 MMOL/L (ref 3.5–5.5)
PROT SERPL-MCNC: 7.7 G/DL (ref 6.4–8.2)
RBC # BLD AUTO: 4.04 M/UL (ref 4.35–5.65)
RBC MORPH BLD: ABNORMAL
SODIUM SERPL-SCNC: 134 MMOL/L (ref 136–145)
TRIGL SERPL-MCNC: 113 MG/DL
URATE SERPL-MCNC: 6.9 MG/DL (ref 2.6–7.2)
VIT B12 SERPL-MCNC: 842 PG/ML (ref 211–911)
VLDLC SERPL CALC-MCNC: 22.6 MG/DL
WBC # BLD AUTO: 2.9 K/UL (ref 4.6–13.2)

## 2024-05-01 PROCEDURE — 84550 ASSAY OF BLOOD/URIC ACID: CPT

## 2024-05-01 PROCEDURE — 83735 ASSAY OF MAGNESIUM: CPT

## 2024-05-01 PROCEDURE — 82607 VITAMIN B-12: CPT

## 2024-05-01 PROCEDURE — 36415 COLL VENOUS BLD VENIPUNCTURE: CPT

## 2024-05-01 PROCEDURE — 36415 COLL VENOUS BLD VENIPUNCTURE: CPT | Performed by: FAMILY MEDICINE

## 2024-05-01 PROCEDURE — 80053 COMPREHEN METABOLIC PANEL: CPT

## 2024-05-01 PROCEDURE — 80061 LIPID PANEL: CPT

## 2024-05-01 PROCEDURE — 85025 COMPLETE CBC W/AUTO DIFF WBC: CPT

## 2024-05-10 ENCOUNTER — OFFICE VISIT (OUTPATIENT)
Facility: CLINIC | Age: 74
End: 2024-05-10

## 2024-05-10 VITALS
BODY MASS INDEX: 23.59 KG/M2 | OXYGEN SATURATION: 97 % | WEIGHT: 164.8 LBS | SYSTOLIC BLOOD PRESSURE: 130 MMHG | HEIGHT: 70 IN | DIASTOLIC BLOOD PRESSURE: 82 MMHG | HEART RATE: 90 BPM | TEMPERATURE: 97.2 F

## 2024-05-10 DIAGNOSIS — K76.0 HEPATIC STEATOSIS: ICD-10-CM

## 2024-05-10 DIAGNOSIS — J45.50 SEVERE PERSISTENT ASTHMA WITHOUT COMPLICATION: ICD-10-CM

## 2024-05-10 DIAGNOSIS — M1A.0790 CHRONIC IDIOPATHIC GOUT INVOLVING TOE WITHOUT TOPHUS, UNSPECIFIED LATERALITY: Primary | ICD-10-CM

## 2024-05-10 DIAGNOSIS — R74.01 ELEVATION OF LEVELS OF LIVER TRANSAMINASE LEVELS: ICD-10-CM

## 2024-05-10 DIAGNOSIS — R79.9 ABNORMAL BLOOD CELL COUNT: ICD-10-CM

## 2024-05-10 RX ORDER — ALLOPURINOL 300 MG/1
300 TABLET ORAL DAILY
Qty: 90 TABLET | Refills: 3 | Status: SHIPPED | OUTPATIENT
Start: 2024-05-10

## 2024-05-10 RX ORDER — INDOMETHACIN 50 MG/1
CAPSULE ORAL
Qty: 30 CAPSULE | Refills: 1 | Status: SHIPPED | OUTPATIENT
Start: 2024-05-10

## 2024-05-10 ASSESSMENT — PATIENT HEALTH QUESTIONNAIRE - PHQ9
6. FEELING BAD ABOUT YOURSELF - OR THAT YOU ARE A FAILURE OR HAVE LET YOURSELF OR YOUR FAMILY DOWN: NOT AT ALL
2. FEELING DOWN, DEPRESSED OR HOPELESS: NOT AT ALL
SUM OF ALL RESPONSES TO PHQ QUESTIONS 1-9: 1
1. LITTLE INTEREST OR PLEASURE IN DOING THINGS: NOT AT ALL
SUM OF ALL RESPONSES TO PHQ QUESTIONS 1-9: 1
9. THOUGHTS THAT YOU WOULD BE BETTER OFF DEAD, OR OF HURTING YOURSELF: NOT AT ALL
10. IF YOU CHECKED OFF ANY PROBLEMS, HOW DIFFICULT HAVE THESE PROBLEMS MADE IT FOR YOU TO DO YOUR WORK, TAKE CARE OF THINGS AT HOME, OR GET ALONG WITH OTHER PEOPLE: NOT DIFFICULT AT ALL
3. TROUBLE FALLING OR STAYING ASLEEP: SEVERAL DAYS
4. FEELING TIRED OR HAVING LITTLE ENERGY: NOT AT ALL
5. POOR APPETITE OR OVEREATING: NOT AT ALL
7. TROUBLE CONCENTRATING ON THINGS, SUCH AS READING THE NEWSPAPER OR WATCHING TELEVISION: NOT AT ALL
SUM OF ALL RESPONSES TO PHQ QUESTIONS 1-9: 1
8. MOVING OR SPEAKING SO SLOWLY THAT OTHER PEOPLE COULD HAVE NOTICED. OR THE OPPOSITE, BEING SO FIGETY OR RESTLESS THAT YOU HAVE BEEN MOVING AROUND A LOT MORE THAN USUAL: NOT AT ALL
SUM OF ALL RESPONSES TO PHQ QUESTIONS 1-9: 1
SUM OF ALL RESPONSES TO PHQ9 QUESTIONS 1 & 2: 0

## 2024-05-10 ASSESSMENT — ASTHMA QUESTIONNAIRES
QUESTION_1 LAST FOUR WEEKS HOW MUCH OF THE TIME DID YOUR ASTHMA KEEP YOU FROM GETTING AS MUCH DONE AT WORK, SCHOOL OR AT HOME: A LITTLE OF THE TIME
QUESTION_4 LAST FOUR WEEKS HOW OFTEN HAVE YOU USED YOUR RESCUE INHALER OR NEBULIZER MEDICATION (SUCH AS ALBUTEROL): 2 OR 3 TIMES PER WEEK
ACT_TOTALSCORE: 19
QUESTION_3 LAST FOUR WEEKS HOW OFTEN DID YOUR ASTHMA SYMPTOMS (WHEEZING, COUGHING, SHORTNESS OF BREATH, CHEST TIGHTNESS OR PAIN) WAKE YOU UP AT NIGHT OR EARLIER THAN USUAL IN THE MORNING: ONCE OR TWICE
QUESTION_2 LAST FOUR WEEKS HOW OFTEN HAVE YOU HAD SHORTNESS OF BREATH: ONCE OR TWICE A WEEK
QUESTION_5 LAST FOUR WEEKS HOW WOULD YOU RATE YOUR ASTHMA CONTROL: WELL CONTROLLED

## 2024-05-10 NOTE — ASSESSMENT & PLAN NOTE
Transaminases again elevated. Also with low WBC and mild anemia, unchanged. normal plts. Discussed options: referral for eval including potential biopsy as would be needed for definitive dx and assessment of fibrosis vs non invasive assessment without referral at the moment. Electing the latter. US and fibroscan after his trip.

## 2024-05-10 NOTE — PROGRESS NOTES
Chief Complaint   Patient presents with    Gout    Hypertension    Asthma     \"Have you been to the ER, urgent care clinic since your last visit?  Hospitalized since your last visit    NO    “Have you seen or consulted any other health care providers outside of [facility] since your last visit?”    YES - When: approximately 7 days ago.  Where and Why: [facility], fall, back injury.            Click Here for Release of Records Request

## 2024-05-10 NOTE — ASSESSMENT & PLAN NOTE
WBC count low at 2.9, Hb low at 12.8. normal plts. Unchanged over past 4 years, when has been under hem/onc specialty care for prostate cancer. No intervention indicated

## 2024-05-10 NOTE — PROGRESS NOTES
Michael Espinoza (: 1950) is a 73 y.o. male, established patient, here for:    ASSESSMENT/PLAN:  1. Chronic idiopathic gout involving toe without tophus, unspecified laterality  Assessment & Plan:  Uncontrolled with flares approximately every 6 weeks.  Increase allopurinol to 300 mg daily.  Continue indomethacin as needed.  Follow-up labs in 6 months   Orders:  -     indomethacin (INDOCIN) 50 MG capsule; take 1 capsule by mouth three times a day if needed for FOR GOUT, Disp-30 capsule, R-1Normal  -     allopurinol (ZYLOPRIM) 300 MG tablet; Take 1 tablet by mouth daily, Disp-90 tablet, R-3Normal  2. Elevation of levels of liver transaminase levels  Assessment & Plan:  Transaminases again elevated. Also with low WBC and mild anemia, unchanged. normal plts. Discussed options: referral for eval including potential biopsy as would be needed for definitive dx and assessment of fibrosis vs non invasive assessment without referral at the moment. Electing the latter. US and fibroscan after his trip.   Orders:  -     US GALLBLADDER RUQ; Future  -     US ORGAN ELASTOGRAPHY; Future  3. Hepatic steatosis  4. Severe persistent asthma without complication  Assessment & Plan:  Being evaluated/managed by Dr. Moyer. No acute findings today meriting change in management plan.    5. Abnormal blood cell count  Assessment & Plan:  WBC count low at 2.9, Hb low at 12.8. normal plts. Unchanged over past 4 years, when has been under hem/onc specialty care for prostate cancer. No intervention indicated            Follow-up and Dispositions    Return in about 1 month (around 6/10/2024) for liver ultrasound/fibroscan and gout follow up, no labs prior, (30).            SUBJECTIVE/OBJECTIVE:  Chief Complaint   Patient presents with    Gout    Hypertension    Asthma      History of Present Illness  The patient presents for follow-up of gout on allopurinol 100 mg prophylaxis.    The patient reports experiencing gout attacks

## 2024-05-10 NOTE — ASSESSMENT & PLAN NOTE
Uncontrolled with flares approximately every 6 weeks.  Increase allopurinol to 300 mg daily.  Continue indomethacin as needed.  Follow-up labs in 6 months

## 2024-05-10 NOTE — ASSESSMENT & PLAN NOTE
Being evaluated/managed by Dr. Moyer. No acute findings today meriting change in management plan.

## 2024-05-22 ENCOUNTER — TELEPHONE (OUTPATIENT)
Facility: CLINIC | Age: 74
End: 2024-05-22

## 2024-05-22 DIAGNOSIS — R74.01 ELEVATION OF LEVELS OF LIVER TRANSAMINASE LEVELS: Primary | ICD-10-CM

## 2024-05-22 NOTE — TELEPHONE ENCOUNTER
Per [facility] called the office stating patients order for U/S needs to be changed to US of Abdomen with/Elastography. Order has been pended please review and sign if appropriate.

## 2024-06-03 ENCOUNTER — HOSPITAL ENCOUNTER (OUTPATIENT)
Facility: HOSPITAL | Age: 74
Discharge: HOME | End: 2024-06-06
Attending: FAMILY MEDICINE
Payer: MEDICARE

## 2024-06-03 DIAGNOSIS — R74.01 ELEVATION OF LEVELS OF LIVER TRANSAMINASE LEVELS: ICD-10-CM

## 2024-06-03 PROCEDURE — 76981 USE PARENCHYMA: CPT

## 2024-06-27 ENCOUNTER — HOSPITAL ENCOUNTER (OUTPATIENT)
Facility: HOSPITAL | Age: 74
Discharge: HOME | End: 2024-06-27
Attending: FAMILY MEDICINE
Payer: MEDICARE

## 2024-06-27 DIAGNOSIS — K86.89 MASS OF PANCREAS: ICD-10-CM

## 2024-06-27 LAB — CREAT UR-MCNC: 1.3 MG/DL (ref 0.6–1.3)

## 2024-06-27 PROCEDURE — 82565 ASSAY OF CREATININE: CPT

## 2024-06-27 PROCEDURE — 74170 CT ABD WO CNTRST FLWD CNTRST: CPT

## 2024-06-27 PROCEDURE — 6360000004 HC RX CONTRAST MEDICATION: Performed by: FAMILY MEDICINE

## 2024-06-27 RX ADMIN — IOPAMIDOL 100 ML: 755 INJECTION, SOLUTION INTRAVENOUS at 08:54

## 2024-07-02 NOTE — RESULT ENCOUNTER NOTE
Amado Rodriguez, please schedule a visit with me to address your CT results. Either virtual or in person is fine. Take good care, JOSE

## 2024-07-05 ENCOUNTER — OFFICE VISIT (OUTPATIENT)
Facility: CLINIC | Age: 74
End: 2024-07-05

## 2024-07-05 VITALS
WEIGHT: 161 LBS | SYSTOLIC BLOOD PRESSURE: 126 MMHG | HEIGHT: 70 IN | TEMPERATURE: 97.4 F | HEART RATE: 91 BPM | OXYGEN SATURATION: 99 % | BODY MASS INDEX: 23.05 KG/M2 | DIASTOLIC BLOOD PRESSURE: 70 MMHG

## 2024-07-05 DIAGNOSIS — R93.5 ABNORMAL CT OF THE ABDOMEN: Primary | ICD-10-CM

## 2024-07-05 DIAGNOSIS — K31.89 GASTRIC MASS: ICD-10-CM

## 2024-07-05 NOTE — PROGRESS NOTES
Chief Complaint   Patient presents with    Follow-up     Patient here to discuss CT scan results.       \"Have you been to the ER, urgent care clinic since your last visit?  Hospitalized since your last visit?\"    NO    “Have you seen or consulted any other health care providers outside of [facility] since your last visit?”    NO            Click Here for Release of Records Request

## 2024-07-05 NOTE — PROGRESS NOTES
Michael Espinoza (: 1950) is a 73 y.o. male, established patient, here for:    ASSESSMENT/PLAN:  1. Abnormal CT of the abdomen  -     [facility] - Gricelda Horvath MD, General Surgery, [place]  2. Gastric mass  -     [facility] - Gricelda Horvath MD, General Surgery, [place]    Assessment & Plan  1. Radiologically suggestive of stromal tumor.  A consultation with a surgeon has been recommended for definitive dx and discussion of subsequent options. He agrees, with a desire to ascertain the nature of the tumor, expressing a lack of interest in exploring any drastic interventions.     Follow-up and Dispositions    Return if symptoms worsen or fail to improve.            SUBJECTIVE/OBJECTIVE:  Chief Complaint   Patient presents with    Follow-up     Patient here to discuss CT scan results.      History of Present Illness  The patient presents for review of CT results         CT Result (most recent):  CT ABDOMEN W WO IV CONTRAST 2024    Narrative  EXAM: CT ABDOMEN W WO CONTRAST    INDICATION: Other specified diseases of pancreas, pancreatic mass.    COMPARISON: Ultrasound 6/3/2024, CT abdomen 2015.    TECHNIQUE:    Multislice helical CT of the abdomen was performed prior to and after  administration of contrast. Coronal and sagittal reformations were generated.  CT dose reduction was achieved through use of a standardized protocol tailored  for this examination and automatic exposure control for dose modulation.    FINDINGS:    Lower chest: Hyperdense 4 mm round nodule at the posterior left lower lobe  stable compared to 2015, likely benign calcified granuloma.    Liver: Hepatic steatosis. There is a nonenhancing 1.0 cm hypodensity of the  right hepatic lobe (2, 30), likely cyst..    Gallbladder/biliary: Decompressed gallbladder. No biliary dilation.    Spleen: Unremarkable in size, small splenule seen posterior to the spleen.    Pancreas: No lesion. No peripancreatic inflammation or fluid. No

## 2024-07-11 ENCOUNTER — OFFICE VISIT (OUTPATIENT)
Age: 74
End: 2024-07-11

## 2024-07-11 VITALS
DIASTOLIC BLOOD PRESSURE: 80 MMHG | RESPIRATION RATE: 18 BRPM | HEART RATE: 76 BPM | SYSTOLIC BLOOD PRESSURE: 130 MMHG | TEMPERATURE: 97.7 F | BODY MASS INDEX: 22.9 KG/M2 | WEIGHT: 160 LBS | HEIGHT: 70 IN

## 2024-07-11 DIAGNOSIS — K31.89 GASTRIC MASS: Primary | ICD-10-CM

## 2024-07-11 NOTE — PROGRESS NOTES
patient decision on the surgery  If he decides he would like to proceed we will schedule him for robotic partial gastrectomy    Please call me if you have any questions (cell phone: [phone])     Signed By: Artie Horvath MD     July 11, 2024

## 2024-07-12 ENCOUNTER — PREP FOR PROCEDURE (OUTPATIENT)
Age: 74
End: 2024-07-12

## 2024-07-12 DIAGNOSIS — K31.89 GASTRIC MASS: ICD-10-CM

## 2024-07-23 ENCOUNTER — ANESTHESIA EVENT (OUTPATIENT)
Facility: HOSPITAL | Age: 74
End: 2024-07-23
Payer: MEDICARE

## 2024-07-23 PROBLEM — K31.89 GASTRIC MASS: Status: ACTIVE | Noted: 2024-07-12

## 2024-07-24 ENCOUNTER — ANESTHESIA (OUTPATIENT)
Facility: HOSPITAL | Age: 74
End: 2024-07-24
Payer: MEDICARE

## 2024-07-24 ENCOUNTER — HOSPITAL ENCOUNTER (OUTPATIENT)
Facility: HOSPITAL | Age: 74
Setting detail: SURGERY ADMIT
Discharge: HOME | End: 2024-07-24
Attending: SURGERY | Admitting: SURGERY
Payer: MEDICARE

## 2024-07-24 VITALS
TEMPERATURE: 97.7 F | WEIGHT: 159.6 LBS | BODY MASS INDEX: 22.9 KG/M2 | DIASTOLIC BLOOD PRESSURE: 77 MMHG | RESPIRATION RATE: 16 BRPM | HEART RATE: 71 BPM | SYSTOLIC BLOOD PRESSURE: 128 MMHG | OXYGEN SATURATION: 96 %

## 2024-07-24 DIAGNOSIS — K31.89 GASTRIC MASS: Primary | ICD-10-CM

## 2024-07-24 PROCEDURE — 3600000002 HC SURGERY LEVEL 2 BASE: Performed by: SURGERY

## 2024-07-24 PROCEDURE — 3700000000 HC ANESTHESIA ATTENDED CARE: Performed by: SURGERY

## 2024-07-24 PROCEDURE — 2500000003 HC RX 250 WO HCPCS: Performed by: SURGERY

## 2024-07-24 PROCEDURE — 88305 TISSUE EXAM BY PATHOLOGIST: CPT

## 2024-07-24 PROCEDURE — 6360000002 HC RX W HCPCS: Performed by: NURSE ANESTHETIST, CERTIFIED REGISTERED

## 2024-07-24 PROCEDURE — 3700000001 HC ADD 15 MINUTES (ANESTHESIA): Performed by: SURGERY

## 2024-07-24 PROCEDURE — 2580000003 HC RX 258: Performed by: NURSE ANESTHETIST, CERTIFIED REGISTERED

## 2024-07-24 PROCEDURE — 6360000002 HC RX W HCPCS: Performed by: SURGERY

## 2024-07-24 PROCEDURE — 2500000003 HC RX 250 WO HCPCS: Performed by: NURSE ANESTHETIST, CERTIFIED REGISTERED

## 2024-07-24 PROCEDURE — 3600000012 HC SURGERY LEVEL 2 ADDTL 15MIN: Performed by: SURGERY

## 2024-07-24 PROCEDURE — 88341 IMHCHEM/IMCYTCHM EA ADD ANTB: CPT

## 2024-07-24 PROCEDURE — 88342 IMHCHEM/IMCYTCHM 1ST ANTB: CPT

## 2024-07-24 PROCEDURE — 2709999900 HC NON-CHARGEABLE SUPPLY: Performed by: SURGERY

## 2024-07-24 PROCEDURE — 7100000011 HC PHASE II RECOVERY - ADDTL 15 MIN: Performed by: SURGERY

## 2024-07-24 PROCEDURE — 7100000001 HC PACU RECOVERY - ADDTL 15 MIN: Performed by: SURGERY

## 2024-07-24 PROCEDURE — 2580000003 HC RX 258: Performed by: SURGERY

## 2024-07-24 PROCEDURE — 2720000010 HC SURG SUPPLY STERILE: Performed by: SURGERY

## 2024-07-24 PROCEDURE — 7100000000 HC PACU RECOVERY - FIRST 15 MIN: Performed by: SURGERY

## 2024-07-24 PROCEDURE — A4217 STERILE WATER/SALINE, 500 ML: HCPCS | Performed by: SURGERY

## 2024-07-24 PROCEDURE — 7100000010 HC PHASE II RECOVERY - FIRST 15 MIN: Performed by: SURGERY

## 2024-07-24 RX ORDER — FENTANYL CITRATE 50 UG/ML
25 INJECTION, SOLUTION INTRAMUSCULAR; INTRAVENOUS EVERY 5 MIN PRN
Status: DISCONTINUED | OUTPATIENT
Start: 2024-07-24 | End: 2024-07-24 | Stop reason: RX

## 2024-07-24 RX ORDER — ROCURONIUM BROMIDE 10 MG/ML
INJECTION, SOLUTION INTRAVENOUS PRN
Status: DISCONTINUED | OUTPATIENT
Start: 2024-07-24 | End: 2024-07-24 | Stop reason: SDUPTHER

## 2024-07-24 RX ORDER — ONDANSETRON 2 MG/ML
INJECTION INTRAMUSCULAR; INTRAVENOUS PRN
Status: DISCONTINUED | OUTPATIENT
Start: 2024-07-24 | End: 2024-07-24 | Stop reason: SDUPTHER

## 2024-07-24 RX ORDER — KETOROLAC TROMETHAMINE 15 MG/ML
INJECTION, SOLUTION INTRAMUSCULAR; INTRAVENOUS PRN
Status: DISCONTINUED | OUTPATIENT
Start: 2024-07-24 | End: 2024-07-24 | Stop reason: SDUPTHER

## 2024-07-24 RX ORDER — MIDAZOLAM HYDROCHLORIDE 1 MG/ML
INJECTION INTRAMUSCULAR; INTRAVENOUS PRN
Status: DISCONTINUED | OUTPATIENT
Start: 2024-07-24 | End: 2024-07-24 | Stop reason: SDUPTHER

## 2024-07-24 RX ORDER — SODIUM CHLORIDE, SODIUM LACTATE, POTASSIUM CHLORIDE, CALCIUM CHLORIDE 600; 310; 30; 20 MG/100ML; MG/100ML; MG/100ML; MG/100ML
INJECTION, SOLUTION INTRAVENOUS CONTINUOUS
Status: DISCONTINUED | OUTPATIENT
Start: 2024-07-24 | End: 2024-07-24 | Stop reason: HOSPADM

## 2024-07-24 RX ORDER — PROCHLORPERAZINE EDISYLATE 5 MG/ML
5 INJECTION INTRAMUSCULAR; INTRAVENOUS
Status: DISCONTINUED | OUTPATIENT
Start: 2024-07-24 | End: 2024-07-24 | Stop reason: HOSPADM

## 2024-07-24 RX ORDER — NALOXONE HYDROCHLORIDE 0.4 MG/ML
INJECTION, SOLUTION INTRAMUSCULAR; INTRAVENOUS; SUBCUTANEOUS PRN
Status: DISCONTINUED | OUTPATIENT
Start: 2024-07-24 | End: 2024-07-24 | Stop reason: HOSPADM

## 2024-07-24 RX ORDER — DEXAMETHASONE SODIUM PHOSPHATE 4 MG/ML
INJECTION, SOLUTION INTRA-ARTICULAR; INTRALESIONAL; INTRAMUSCULAR; INTRAVENOUS; SOFT TISSUE PRN
Status: DISCONTINUED | OUTPATIENT
Start: 2024-07-24 | End: 2024-07-24 | Stop reason: SDUPTHER

## 2024-07-24 RX ORDER — SODIUM CHLORIDE 0.9 % (FLUSH) 0.9 %
5-40 SYRINGE (ML) INJECTION PRN
Status: DISCONTINUED | OUTPATIENT
Start: 2024-07-24 | End: 2024-07-24 | Stop reason: HOSPADM

## 2024-07-24 RX ORDER — FENTANYL CITRATE 50 UG/ML
50 INJECTION, SOLUTION INTRAMUSCULAR; INTRAVENOUS EVERY 5 MIN PRN
Status: DISCONTINUED | OUTPATIENT
Start: 2024-07-24 | End: 2024-07-24 | Stop reason: RX

## 2024-07-24 RX ORDER — LIDOCAINE HYDROCHLORIDE 10 MG/ML
1 INJECTION, SOLUTION EPIDURAL; INFILTRATION; INTRACAUDAL; PERINEURAL
Status: DISCONTINUED | OUTPATIENT
Start: 2024-07-24 | End: 2024-07-24 | Stop reason: HOSPADM

## 2024-07-24 RX ORDER — OXYCODONE HYDROCHLORIDE AND ACETAMINOPHEN 5; 325 MG/1; MG/1
1 TABLET ORAL EVERY 6 HOURS PRN
Qty: 12 TABLET | Refills: 0 | Status: SHIPPED | OUTPATIENT
Start: 2024-07-24 | End: 2024-07-27

## 2024-07-24 RX ORDER — PHENYLEPHRINE HCL IN 0.9% NACL 1 MG/10 ML
SYRINGE (ML) INTRAVENOUS PRN
Status: DISCONTINUED | OUTPATIENT
Start: 2024-07-24 | End: 2024-07-24 | Stop reason: SDUPTHER

## 2024-07-24 RX ORDER — PROPOFOL 10 MG/ML
INJECTION, EMULSION INTRAVENOUS PRN
Status: DISCONTINUED | OUTPATIENT
Start: 2024-07-24 | End: 2024-07-24 | Stop reason: SDUPTHER

## 2024-07-24 RX ORDER — LIDOCAINE HYDROCHLORIDE 20 MG/ML
INJECTION, SOLUTION EPIDURAL; INFILTRATION; INTRACAUDAL; PERINEURAL PRN
Status: DISCONTINUED | OUTPATIENT
Start: 2024-07-24 | End: 2024-07-24 | Stop reason: SDUPTHER

## 2024-07-24 RX ORDER — SUCCINYLCHOLINE/SOD CL,ISO/PF 100 MG/5ML
SYRINGE (ML) INTRAVENOUS PRN
Status: DISCONTINUED | OUTPATIENT
Start: 2024-07-24 | End: 2024-07-24 | Stop reason: SDUPTHER

## 2024-07-24 RX ORDER — FENTANYL CITRATE 50 UG/ML
INJECTION, SOLUTION INTRAMUSCULAR; INTRAVENOUS PRN
Status: DISCONTINUED | OUTPATIENT
Start: 2024-07-24 | End: 2024-07-24 | Stop reason: SDUPTHER

## 2024-07-24 RX ADMIN — WATER 2000 MG: 1 INJECTION, SOLUTION INTRAMUSCULAR; INTRAVENOUS; SUBCUTANEOUS at 12:52

## 2024-07-24 RX ADMIN — DEXAMETHASONE SODIUM PHOSPHATE 4 MG: 4 INJECTION INTRA-ARTICULAR; INTRALESIONAL; INTRAMUSCULAR; INTRAVENOUS; SOFT TISSUE at 12:52

## 2024-07-24 RX ADMIN — MIDAZOLAM 1 MG: 1 INJECTION, SOLUTION INTRAMUSCULAR; INTRAVENOUS at 12:45

## 2024-07-24 RX ADMIN — SODIUM CHLORIDE, SODIUM LACTATE, POTASSIUM CHLORIDE, AND CALCIUM CHLORIDE: 600; 310; 30; 20 INJECTION, SOLUTION INTRAVENOUS at 11:26

## 2024-07-24 RX ADMIN — SODIUM CHLORIDE, SODIUM LACTATE, POTASSIUM CHLORIDE, AND CALCIUM CHLORIDE: 600; 310; 30; 20 INJECTION, SOLUTION INTRAVENOUS at 13:30

## 2024-07-24 RX ADMIN — FAMOTIDINE 20 MG: 10 INJECTION, SOLUTION INTRAVENOUS at 11:27

## 2024-07-24 RX ADMIN — KETOROLAC TROMETHAMINE 15 MG: 15 INJECTION, SOLUTION INTRAMUSCULAR; INTRAVENOUS at 13:24

## 2024-07-24 RX ADMIN — ROCURONIUM BROMIDE 25 MG: 50 INJECTION INTRAVENOUS at 12:51

## 2024-07-24 RX ADMIN — SUGAMMADEX 200 MG: 100 INJECTION, SOLUTION INTRAVENOUS at 13:23

## 2024-07-24 RX ADMIN — LIDOCAINE HYDROCHLORIDE 50 MG: 20 INJECTION, SOLUTION EPIDURAL; INFILTRATION; INTRACAUDAL; PERINEURAL at 12:46

## 2024-07-24 RX ADMIN — Medication 100 MG: at 12:47

## 2024-07-24 RX ADMIN — PROPOFOL 150 MG: 10 INJECTION, EMULSION INTRAVENOUS at 12:46

## 2024-07-24 RX ADMIN — ONDANSETRON 4 MG: 2 INJECTION INTRAMUSCULAR; INTRAVENOUS at 13:24

## 2024-07-24 RX ADMIN — MIDAZOLAM 1 MG: 1 INJECTION, SOLUTION INTRAMUSCULAR; INTRAVENOUS at 12:41

## 2024-07-24 RX ADMIN — FENTANYL CITRATE 50 MCG: 50 INJECTION INTRAMUSCULAR; INTRAVENOUS at 12:41

## 2024-07-24 RX ADMIN — Medication 200 MCG: at 13:00

## 2024-07-24 RX ADMIN — ROCURONIUM BROMIDE 5 MG: 50 INJECTION INTRAVENOUS at 12:46

## 2024-07-24 NOTE — DISCHARGE INSTRUCTIONS
Discharge Instructions Following Surgery    Patient: Michael Espinoza MRN: [id number]  SSN: [id number]    YOB: 1950  Age: 73 y.o.  Sex: male      Activity  As tolerated, walking encourage, stairs are okay.  Avoid strenuous activities - no lifting anything heavier than 15 pounds till seen in the clinic.  You may shower at home after 24 hours.  For the first 24 hours: do not Drive, Drink alcoholic beverages, or make important decisions.  Be aware of dizziness following anesthesia and while taking pain medication.    Diet and Medications  Regular diet after nausea from the anesthetic has passed.  Take pain medication as directed by your doctor.  Call your doctor if pain is NOT relieved by medication.    Wound and Dressing Care  There is glue on the wounds. No need for any dressing care.   Apply ice packs to the area of the surgery for the first 1 to 2 days  Apply warm compresses after 2 days for pain relieve if needed    Call your doctor if  Excessive bleeding that does not stop after holding pressure over the area.  Temperature of 101 degrees F or above.  Redness,excessive swelling or bruising, and/or green or yellow, smelly discharge from incision.  If nausea and vomiting continues.    Follow-Up    Call the office of Dr. Artie Horvath at [phone] to make your follow-up appointment.    Dr. Horvath's cell phone number is [phone]. Please call me if you have any concerns or questions.

## 2024-07-24 NOTE — ANESTHESIA PRE PROCEDURE
BMI:   Wt Readings from Last 3 Encounters:   07/11/24 72.6 kg (160 lb)   07/05/24 73 kg (161 lb)   05/10/24 74.8 kg (164 lb 12.8 oz)     There is no height or weight on file to calculate BMI.    CBC:   Lab Results   Component Value Date/Time    WBC 2.9 05/01/2024 10:04 AM    RBC 4.04 05/01/2024 10:04 AM    HGB 12.8 05/01/2024 10:04 AM    HCT 39.2 05/01/2024 10:04 AM    MCV 97.0 05/01/2024 10:04 AM    RDW 12.2 05/01/2024 10:04 AM     05/01/2024 10:04 AM       CMP:   Lab Results   Component Value Date/Time     05/01/2024 10:04 AM    K 4.4 05/01/2024 10:04 AM    CL 99 05/01/2024 10:04 AM    CO2 28 05/01/2024 10:04 AM    BUN 15 05/01/2024 10:04 AM    CREATININE 1.3 06/27/2024 08:25 AM    CREATININE 1.28 05/01/2024 10:04 AM    GFRAA >60 01/19/2022 09:31 AM    AGRATIO 0.9 12/05/2022 09:36 AM    LABGLOM 59 05/01/2024 10:04 AM    LABGLOM >60 11/10/2023 01:57 PM    LABGLOM >60 10/21/2022 01:30 PM    GLUCOSE 89 05/01/2024 10:04 AM    CALCIUM 9.0 05/01/2024 10:04 AM    BILITOT 0.9 05/01/2024 10:04 AM    ALKPHOS 72 05/01/2024 10:04 AM    ALKPHOS 70 12/05/2022 09:36 AM     05/01/2024 10:04 AM     05/01/2024 10:04 AM       POC Tests: No results for input(s): \"POCGLU\", \"POCNA\", \"POCK\", \"POCCL\", \"POCBUN\", \"POCHEMO\", \"POCHCT\" in the last 72 hours.    Coags: No results found for: \"PROTIME\", \"INR\", \"APTT\"    HCG (If Applicable): No results found for: \"PREGTESTUR\", \"PREGSERUM\", \"HCG\", \"HCGQUANT\"     ABGs: No results found for: \"PHART\", \"PO2ART\", \"AHJ9SNE\", \"PLM0OMZ\", \"BEART\", \"B1SMDPDI\"     Type & Screen (If Applicable):  No results found for: \"LABABO\"    Drug/Infectious Status (If Applicable):  No results found for: \"HIV\", \"HEPCAB\"    COVID-19 Screening (If Applicable): No results found for: \"COVID19\"        Anesthesia Evaluation  Patient summary reviewed  Airway: Mallampati: II  TM distance: >3 FB   Neck ROM: full  Mouth opening: > = 3 FB   Dental: normal

## 2024-07-24 NOTE — PERIOP NOTE
Patient /Family /Designee has been informed that [facility] hospital is not responsible for patient belongings per policy and the signed [facility] Patient Agreement document.  Personal items should be sent home or checked in with security.  Patient /Family /Designee selected the following action:                            [x]  Send personal items home with a family member or friend                                                 []  Check in personal items with security, excluding clothing                            []  Maintain personal items at the bedside, against recommendation                                 by [facility]                                   ** If patient /family /designee chooses to maintain personal items at the bedside,                                      Complete the patient belongings inventory in the EMR.   Belongings are with wife, Magali Espinoza.  Number: [phone]

## 2024-07-24 NOTE — PROGRESS NOTES
Update History & Physical    The patient's History and Physical was reviewed with the patient and I examined the patient. There was no change. The surgical site was confirmed by the patient and me.       Plan: The risks, benefits, expected outcome, and alternative to the recommended procedure have been discussed with the patient. Patient understands and wants to proceed with the procedure. Will proceed with robotic partial gastrectomy.    Electronically signed by Artie Horvath MD on 7/24/2024 at 10:21 AM

## 2024-07-24 NOTE — ANESTHESIA POSTPROCEDURE EVALUATION
Department of Anesthesiology  Postprocedure Note    Patient: Michale Espinoza  MRN: [id number]  YOB: 1950  Date of evaluation: 7/24/2024    Procedure Summary       Date: 07/24/24 Room / Location: [facility] MAIN 04 / [facility] MAIN OR    Anesthesia Start: 1241 Anesthesia Stop: 1343    Procedure: ROBOTIC PARTIAL GASTRECTOMY Diagnosis:       Gastric mass      (Gastric mass [K31.89])    Surgeons: Artie Horvath MD Responsible Provider: Mac Melchor MD    Anesthesia Type: General ASA Status: 3            Anesthesia Type: General    Mohsen Phase I: Mohsen Score: 10    Mohsen Phase II:      Anesthesia Post Evaluation    Patient location during evaluation: PACU  Patient participation: complete - patient participated  Level of consciousness: sleepy but conscious  Pain score: 0  Airway patency: patent  Nausea & Vomiting: no nausea and no vomiting  Cardiovascular status: blood pressure returned to baseline  Respiratory status: acceptable  Hydration status: euvolemic  Pain management: adequate    No notable events documented.

## 2024-07-25 NOTE — OP NOTE
the gastric fundus.  At this point, I opened the lesser sac and by dividing the short gastrics and going all the way up to the greater curvature of the stomach, I was able to mobilize the fundus of the stomach and I identified the mass very clearly.  The only concern for me was there are multiple implants all over the upper gastric body and at the GE junction, so this is impossible to remove completely and this in fact seems to be only deposits on the serosa, so I decided only to excise this mass to see what the pathology, and at this point, a robotic stapler was used and 1 stapler to a 60 mm was enough to divide the gastric mass, and at this point, I reinforced the stapler line with a running 2-0 V-Loc suture 9-inch in a running fashion.  Hemostasis was secured.  I took a small biopsy of the end point of the implant as well and an EndoCatch was inserted and the biopsies and the gastric mass were placed inside the EndoCatch and I removed them through the 12 mm incision.  We could not feel the fascia of the 12 mm incision, and so at this point, exploration revealed good hemostasis, so the abdomen was desufflated and the skin incisions were closed with 4-0 Monocryl and glue.  I also took 4 pictures of the pathology and will put them in the patient chart.        SCARLETT RAO MD      YKY/AQS  D:  07/25/2024 07:36:54  T:  07/25/2024 08:20:15  JOB #:  [id number]

## 2024-08-08 ENCOUNTER — OFFICE VISIT (OUTPATIENT)
Age: 74
End: 2024-08-08

## 2024-08-08 ENCOUNTER — TELEPHONE (OUTPATIENT)
Age: 74
End: 2024-08-08

## 2024-08-08 VITALS
SYSTOLIC BLOOD PRESSURE: 140 MMHG | OXYGEN SATURATION: 95 % | BODY MASS INDEX: 22.62 KG/M2 | WEIGHT: 158 LBS | TEMPERATURE: 97.8 F | DIASTOLIC BLOOD PRESSURE: 77 MMHG | HEIGHT: 70 IN | HEART RATE: 88 BPM

## 2024-08-08 DIAGNOSIS — K31.89 GASTRIC MASS: ICD-10-CM

## 2024-08-08 DIAGNOSIS — C49.A2 MALIGNANT GASTROINTESTINAL STROMAL TUMOR (GIST) OF STOMACH (HCC): Primary | ICD-10-CM

## 2024-08-08 PROCEDURE — 99024 POSTOP FOLLOW-UP VISIT: CPT | Performed by: SURGERY

## 2024-08-08 NOTE — PROGRESS NOTES
Michael Espinoza is a 74 y.o. male (: 1950) presenting to address:    Chief Complaint   Patient presents with    Post-Op Check     Partial Gastrectomy 24       Medication list and allergies have been reviewed with Michael Espinoza and updated as of today's date.     I have gone over all Medical, Surgical and Social History with Michael Espinoza and updated/added the information accordingly.       1. Have you been to the ER, Urgent Care or Hospitalized since your last visit? No          2. Have you followed up with your PCP or any other Physicians since your procedure/ last office visit?   No

## 2024-08-08 NOTE — PROGRESS NOTES
Patient seen and examined.  He is doing relatively well.  I had a long discussion with him and his wife and I did explain to her before also on the phone that he has multiple tumors which is very unusual.  I told her that I did get a couple calls from the pathologist and that they were surprised by this rare incident of multiple GIST tumors.  I told him that the best option is to send him to medical oncology to see their opinion on what is the next step.  I told him that Gaby will call them to schedule an appointment with the medical oncology.  They have my cell phone number if they have any questions meanwhile.

## 2024-08-08 NOTE — TELEPHONE ENCOUNTER
Spoke to Mr. Espinoza to inform of appointment with Dr. Bryan Kate at [facility] located at [address] [place] [address]. [address] [place] [address] (t) [phone] on Thursday, August 15, 2024 at 1:45pm

## 2024-08-22 SDOH — ECONOMIC STABILITY: INCOME INSECURITY: HOW HARD IS IT FOR YOU TO PAY FOR THE VERY BASICS LIKE FOOD, HOUSING, MEDICAL CARE, AND HEATING?: NOT HARD AT ALL

## 2024-08-22 SDOH — ECONOMIC STABILITY: FOOD INSECURITY: WITHIN THE PAST 12 MONTHS, THE FOOD YOU BOUGHT JUST DIDN'T LAST AND YOU DIDN'T HAVE MONEY TO GET MORE.: NEVER TRUE

## 2024-08-22 SDOH — ECONOMIC STABILITY: FOOD INSECURITY: WITHIN THE PAST 12 MONTHS, YOU WORRIED THAT YOUR FOOD WOULD RUN OUT BEFORE YOU GOT MONEY TO BUY MORE.: NEVER TRUE

## 2024-08-22 SDOH — ECONOMIC STABILITY: TRANSPORTATION INSECURITY
IN THE PAST 12 MONTHS, HAS LACK OF TRANSPORTATION KEPT YOU FROM MEETINGS, WORK, OR FROM GETTING THINGS NEEDED FOR DAILY LIVING?: NO

## 2024-08-23 ENCOUNTER — TELEMEDICINE (OUTPATIENT)
Facility: CLINIC | Age: 74
End: 2024-08-23

## 2024-08-23 DIAGNOSIS — C49.A0 GASTROINTESTINAL STROMAL TUMOR (GIST) (HCC): Primary | ICD-10-CM

## 2024-08-23 NOTE — PROGRESS NOTES
Chief Complaint   Patient presents with    Follow-up     Patient is being seen to follow up on referral. Patient was referred to Oncology by his General Surgeon for multiple tumors in his abdomen. Patient would like to discuss Oncology plan of care today.            \"Have you been to the ER, urgent care clinic since your last visit?  Hospitalized since your last visit?\"    NO    “Have you seen or consulted any other health care providers outside of [facility] since your last visit?”    NO            Click Here for Release of Records Request

## 2024-08-23 NOTE — ASSESSMENT & PLAN NOTE
I agree with Dr. Kate's recommendation for a referral to an academic institution for further evaluation and discussion of potential treatment options.  He was advised to contact Dr. Kate's office to express interest in the referral and inquire about the next steps. The referral process typically involves a call from the receiving institution within 1 to 2 weeks, outlining subsequent steps and expectations. If no contact is made within this timeframe, he should follow up with Dr. Kate's office.

## 2024-08-23 NOTE — PROGRESS NOTES
Michael Espinoza is a 74 y.o. male, established patient, who was seen by synchronous (real-time) audio-video technology on 8/23/2024 for     Assessment & Plan:   1. Gastrointestinal stromal tumor (GIST) ([facility])  Assessment & Plan:  I agree with Dr. Kate's recommendation for a referral to an academic institution for further evaluation and discussion of potential treatment options.  He was advised to contact Dr. Kate's office to express interest in the referral and inquire about the next steps. The referral process typically involves a call from the receiving institution within 1 to 2 weeks, outlining subsequent steps and expectations. If no contact is made within this timeframe, he should follow up with Dr. Kate's office.               Subjective:     History of Present Illness  The patient is a 74-year-old male who presents to discuss oncology consultation for recently diagnosed multifocal GIST.     Recapping diagnosis: Investigation of abnormal LFTs involved in ultrasound which identified a gastric mass.  Confirmed CT.  I referred him to Dr. Ham.  He was scheduled for robotic partial gastrectomy on 7/24/2024.  At the time of surgery he was found to have multifocal abnormalities and around the stomach at the GE junction which were biopsied.  Pathology was consistent with multifocal GIST.  He was referred to Dr. Bryan Kate with [facility].    Dr. Kate has recommended a referral to an academic institution for further management, but he is uncertain about the process and what to expect from such a referral. He is also curious about whether this referral could lead to a change in his diagnosis or treatment plan.        Objective:     General: alert, cooperative, no distress   Mental  status: normal mood, behavior, speech, dress, motor activity, and thought processes, able to follow commands   HENT: NCAT   Neck: no visualized mass   Resp: no respiratory distress   Neuro: no gross

## 2024-08-27 ENCOUNTER — HOSPITAL ENCOUNTER (OUTPATIENT)
Facility: HOSPITAL | Age: 74
Discharge: HOME | End: 2024-08-30
Attending: INTERNAL MEDICINE
Payer: MEDICARE

## 2024-08-27 DIAGNOSIS — C16.8 MALIGNANT NEOPLASM OF POSTERIOR WALL OF STOMACH (HCC): ICD-10-CM

## 2024-08-27 DIAGNOSIS — C61 PROSTATE CANCER (HCC): ICD-10-CM

## 2024-08-27 DIAGNOSIS — D72.819 LEUKOPENIA, UNSPECIFIED TYPE: ICD-10-CM

## 2024-08-27 LAB — CREAT UR-MCNC: 1.6 MG/DL (ref 0.6–1.3)

## 2024-08-27 PROCEDURE — A9577 INJ MULTIHANCE: HCPCS | Performed by: INTERNAL MEDICINE

## 2024-08-27 PROCEDURE — 74183 MRI ABD W/O CNTR FLWD CNTR: CPT

## 2024-08-27 PROCEDURE — 82565 ASSAY OF CREATININE: CPT

## 2024-08-27 PROCEDURE — 6360000004 HC RX CONTRAST MEDICATION: Performed by: INTERNAL MEDICINE

## 2024-08-27 PROCEDURE — 71250 CT THORAX DX C-: CPT

## 2024-08-27 RX ADMIN — GADOBENATE DIMEGLUMINE 20 ML: 529 INJECTION, SOLUTION INTRAVENOUS at 08:36

## 2024-09-03 ENCOUNTER — HOSPITAL ENCOUNTER (OUTPATIENT)
Facility: HOSPITAL | Age: 74
Setting detail: SPECIMEN
Discharge: HOME | End: 2024-09-06

## 2024-09-06 ENCOUNTER — HOSPITAL ENCOUNTER (OUTPATIENT)
Facility: HOSPITAL | Age: 74
Discharge: HOME | End: 2024-09-09
Payer: MEDICARE

## 2024-09-06 PROCEDURE — 93005 ELECTROCARDIOGRAM TRACING: CPT | Performed by: INTERNAL MEDICINE

## 2024-09-07 LAB
EKG ATRIAL RATE: 73 BPM
EKG DIAGNOSIS: NORMAL
EKG P AXIS: 64 DEGREES
EKG P-R INTERVAL: 140 MS
EKG Q-T INTERVAL: 386 MS
EKG QRS DURATION: 70 MS
EKG QTC CALCULATION (BAZETT): 425 MS
EKG R AXIS: 57 DEGREES
EKG T AXIS: 68 DEGREES
EKG VENTRICULAR RATE: 73 BPM

## 2024-09-07 PROCEDURE — 93010 ELECTROCARDIOGRAM REPORT: CPT | Performed by: INTERNAL MEDICINE

## 2024-10-27 ENCOUNTER — PATIENT MESSAGE (OUTPATIENT)
Facility: CLINIC | Age: 74
End: 2024-10-27

## 2024-10-27 DIAGNOSIS — C49.A0 GASTROINTESTINAL STROMAL TUMOR (GIST) (HCC): Primary | ICD-10-CM

## 2024-10-29 ENCOUNTER — HOSPITAL ENCOUNTER (OUTPATIENT)
Facility: HOSPITAL | Age: 74
Discharge: HOME | End: 2024-11-01
Payer: MEDICARE

## 2024-10-29 DIAGNOSIS — R74.01 ELEVATION OF LEVELS OF LIVER TRANSAMINASE LEVELS: ICD-10-CM

## 2024-10-29 PROCEDURE — 76981 USE PARENCHYMA: CPT

## 2024-11-19 SDOH — HEALTH STABILITY: PHYSICAL HEALTH: ON AVERAGE, HOW MANY DAYS PER WEEK DO YOU ENGAGE IN MODERATE TO STRENUOUS EXERCISE (LIKE A BRISK WALK)?: 6 DAYS

## 2024-11-19 ASSESSMENT — LIFESTYLE VARIABLES
HOW MANY STANDARD DRINKS CONTAINING ALCOHOL DO YOU HAVE ON A TYPICAL DAY: 1 OR 2
HOW OFTEN DURING THE LAST YEAR HAVE YOU HAD A FEELING OF GUILT OR REMORSE AFTER DRINKING: NEVER
HOW OFTEN DO YOU HAVE A DRINK CONTAINING ALCOHOL: 4 OR MORE TIMES A WEEK
HOW OFTEN DURING THE LAST YEAR HAVE YOU FAILED TO DO WHAT WAS NORMALLY EXPECTED FROM YOU BECAUSE OF DRINKING: NEVER
HOW MANY STANDARD DRINKS CONTAINING ALCOHOL DO YOU HAVE ON A TYPICAL DAY: 1
HOW OFTEN DURING THE LAST YEAR HAVE YOU NEEDED AN ALCOHOLIC DRINK FIRST THING IN THE MORNING TO GET YOURSELF GOING AFTER A NIGHT OF HEAVY DRINKING: NEVER
HOW OFTEN DURING THE LAST YEAR HAVE YOU BEEN UNABLE TO REMEMBER WHAT HAPPENED THE NIGHT BEFORE BECAUSE YOU HAD BEEN DRINKING: NEVER
HAS A RELATIVE, FRIEND, DOCTOR, OR ANOTHER HEALTH PROFESSIONAL EXPRESSED CONCERN ABOUT YOUR DRINKING OR SUGGESTED YOU CUT DOWN: YES, BUT NOT IN THE PAST YEAR
HOW OFTEN DURING THE LAST YEAR HAVE YOU FOUND THAT YOU WERE NOT ABLE TO STOP DRINKING ONCE YOU HAD STARTED: NEVER
HAVE YOU OR SOMEONE ELSE BEEN INJURED AS A RESULT OF YOUR DRINKING: NO
HOW OFTEN DURING THE LAST YEAR HAVE YOU HAD A FEELING OF GUILT OR REMORSE AFTER DRINKING: NEVER
HOW OFTEN DURING THE LAST YEAR HAVE YOU NEEDED AN ALCOHOLIC DRINK FIRST THING IN THE MORNING TO GET YOURSELF GOING AFTER A NIGHT OF HEAVY DRINKING: NEVER
HOW OFTEN DURING THE LAST YEAR HAVE YOU BEEN UNABLE TO REMEMBER WHAT HAPPENED THE NIGHT BEFORE BECAUSE YOU HAD BEEN DRINKING: NEVER
HOW OFTEN DURING THE LAST YEAR HAVE YOU FOUND THAT YOU WERE NOT ABLE TO STOP DRINKING ONCE YOU HAD STARTED: NEVER
HOW OFTEN DO YOU HAVE SIX OR MORE DRINKS ON ONE OCCASION: 2
HAVE YOU OR SOMEONE ELSE BEEN INJURED AS A RESULT OF YOUR DRINKING: NO
HOW OFTEN DO YOU HAVE A DRINK CONTAINING ALCOHOL: 5
HOW OFTEN DURING THE LAST YEAR HAVE YOU FAILED TO DO WHAT WAS NORMALLY EXPECTED FROM YOU BECAUSE OF DRINKING: NEVER
HAS A RELATIVE, FRIEND, DOCTOR, OR ANOTHER HEALTH PROFESSIONAL EXPRESSED CONCERN ABOUT YOUR DRINKING OR SUGGESTED YOU CUT DOWN: YES, BUT NOT IN THE PAST YEAR

## 2024-11-19 ASSESSMENT — PATIENT HEALTH QUESTIONNAIRE - PHQ9
SUM OF ALL RESPONSES TO PHQ QUESTIONS 1-9: 0
SUM OF ALL RESPONSES TO PHQ QUESTIONS 1-9: 0
2. FEELING DOWN, DEPRESSED OR HOPELESS: NOT AT ALL
SUM OF ALL RESPONSES TO PHQ9 QUESTIONS 1 & 2: 0
1. LITTLE INTEREST OR PLEASURE IN DOING THINGS: NOT AT ALL
SUM OF ALL RESPONSES TO PHQ QUESTIONS 1-9: 0
SUM OF ALL RESPONSES TO PHQ QUESTIONS 1-9: 0

## 2024-11-22 ENCOUNTER — OFFICE VISIT (OUTPATIENT)
Facility: CLINIC | Age: 74
End: 2024-11-22

## 2024-11-22 VITALS
HEIGHT: 70 IN | HEART RATE: 84 BPM | SYSTOLIC BLOOD PRESSURE: 128 MMHG | DIASTOLIC BLOOD PRESSURE: 82 MMHG | OXYGEN SATURATION: 95 % | TEMPERATURE: 97.5 F | BODY MASS INDEX: 22.48 KG/M2 | WEIGHT: 157 LBS

## 2024-11-22 DIAGNOSIS — M1A.0790 CHRONIC IDIOPATHIC GOUT INVOLVING TOE WITHOUT TOPHUS, UNSPECIFIED LATERALITY: ICD-10-CM

## 2024-11-22 DIAGNOSIS — Z00.00 MEDICARE ANNUAL WELLNESS VISIT, SUBSEQUENT: Primary | ICD-10-CM

## 2024-11-22 ASSESSMENT — PATIENT HEALTH QUESTIONNAIRE - PHQ9
9. THOUGHTS THAT YOU WOULD BE BETTER OFF DEAD, OR OF HURTING YOURSELF: NOT AT ALL
7. TROUBLE CONCENTRATING ON THINGS, SUCH AS READING THE NEWSPAPER OR WATCHING TELEVISION: NOT AT ALL
3. TROUBLE FALLING OR STAYING ASLEEP: MORE THAN HALF THE DAYS
SUM OF ALL RESPONSES TO PHQ QUESTIONS 1-9: 4
SUM OF ALL RESPONSES TO PHQ9 QUESTIONS 1 & 2: 0
SUM OF ALL RESPONSES TO PHQ QUESTIONS 1-9: 4
SUM OF ALL RESPONSES TO PHQ QUESTIONS 1-9: 4
10. IF YOU CHECKED OFF ANY PROBLEMS, HOW DIFFICULT HAVE THESE PROBLEMS MADE IT FOR YOU TO DO YOUR WORK, TAKE CARE OF THINGS AT HOME, OR GET ALONG WITH OTHER PEOPLE: NOT DIFFICULT AT ALL
1. LITTLE INTEREST OR PLEASURE IN DOING THINGS: NOT AT ALL
8. MOVING OR SPEAKING SO SLOWLY THAT OTHER PEOPLE COULD HAVE NOTICED. OR THE OPPOSITE, BEING SO FIGETY OR RESTLESS THAT YOU HAVE BEEN MOVING AROUND A LOT MORE THAN USUAL: NOT AT ALL
2. FEELING DOWN, DEPRESSED OR HOPELESS: NOT AT ALL
5. POOR APPETITE OR OVEREATING: SEVERAL DAYS
SUM OF ALL RESPONSES TO PHQ QUESTIONS 1-9: 4
6. FEELING BAD ABOUT YOURSELF - OR THAT YOU ARE A FAILURE OR HAVE LET YOURSELF OR YOUR FAMILY DOWN: NOT AT ALL
4. FEELING TIRED OR HAVING LITTLE ENERGY: SEVERAL DAYS

## 2024-11-22 NOTE — PATIENT INSTRUCTIONS
as your Preventive Care list are included within your After Visit Summary for your review.    Other Preventive Recommendations:    A preventive eye exam performed by an eye specialist is recommended every 1-2 years to screen for glaucoma; cataracts, macular degeneration, and other eye disorders.  A preventive dental visit is recommended every 6 months.  Try to get at least 150 minutes of exercise per week or 10,000 steps per day on a pedometer .  Order or download the FREE \"Exercise & Physical Activity: Your Everyday Guide\" from [employer] [place] [employer]. Call 1-353.255.2578 or search [employer] [place] [employer] online.  You need 0594-0067 mg of calcium and 7773-1909 IU of vitamin D per day. It is possible to meet your calcium requirement with diet alone, but a vitamin D supplement is usually necessary to meet this goal.  When exposed to the sun, use a sunscreen that protects against both UVA and UVB radiation with an SPF of 30 or greater. Reapply every 2 to 3 hours or after sweating, drying off with a towel, or swimming.  Always wear a seat belt when traveling in a car. Always wear a helmet when riding a bicycle or motorcycle.

## 2024-11-22 NOTE — ACP (ADVANCE CARE PLANNING)
Advance Care Planning   General Advance Care Planning (ACP) Conversation    Date of Conversation: 11/22/2024  Conducted with: Patient with Decision Making Capacity  Other persons present: None    Healthcare Decision Maker:    Primary Decision Maker: Magali Espinoza (Pick-up) - Spouse - [phone]    Secondary Decision Maker: Savanna Espinoza - Child - [phone]      Content/Action Overview:  Has ACP document(s) NOT on file - requested patient to provide    Length of Voluntary ACP Conversation in minutes:  <16 minutes (Non-Billable)    Rita Linn MD

## 2024-11-22 NOTE — PROGRESS NOTES
Chief Complaint   Patient presents with    Medicare AWV         \"Have you been to the ER, urgent care clinic since your last visit?  Hospitalized since your last visit?\"    NO    “Have you seen or consulted any other health care providers outside our system since your last visit?”    YES - When: approximately 4 days ago.  Where and Why: [facility].

## 2024-11-22 NOTE — PROGRESS NOTES
Medicare Annual Wellness Visit    Michael Espinoza is here for Medicare AWV    Assessment & Plan      Immunizations: COVID-19, Influenza, Shingles, RSV recommended, Tdap in conjunction with any wounds    He declines the hepatitis C screening test at this time.      Medicare annual wellness visit, subsequent  Chronic idiopathic gout involving toe without tophus, unspecified laterality  -     Uric Acid; Future  -     Comprehensive Metabolic Panel; Future  -     CBC with Auto Differential; Future    Results    Recommendations for Preventive Services Due: see orders and patient instructions/AVS.  Recommended screening schedule for the next 5-10 years is provided to the patient in written form: see Patient Instructions/AVS.     Return in about 3 months (around 3/4/2025) for gout follow up, labs prior, (30).     Subjective   History of Present Illness  The patient presents for a Medicare wellness update.    He is in good health and has no specific concerns. He is due for his COVID-19 and influenza vaccines. He has not received the shingles vaccine.    GIST: He has a history of small tumors, which were deemed too small for removal by his surgeon. His oncologist prescribed medication to shrink these tumors. He experiences a constant burning sensation in his abdomen, which he describes as a minor discomfort. He has an appointment with his gastroenterologist in 02/2025.    He is under the care of a urologist who monitors his PSA levels. His last PSA test showed normal results.    He has not seen his orthopedic surgeon, Dr. Yeh, in a few years. He has completed his treatment with his radiation oncologist.     Patient's complete Health Risk Assessment and screening values have been reviewed and are found in Flowsheets. The following problems were reviewed today and where indicated follow up appointments were made and/or referrals ordered.    Positive Risk Factor Screenings with Interventions:       Alcohol

## 2025-05-03 DIAGNOSIS — M1A.0790 CHRONIC IDIOPATHIC GOUT INVOLVING TOE WITHOUT TOPHUS, UNSPECIFIED LATERALITY: ICD-10-CM

## 2025-05-05 RX ORDER — ALLOPURINOL 300 MG/1
TABLET ORAL
Refills: 0 | OUTPATIENT
Start: 2025-05-05

## 2025-05-05 NOTE — TELEPHONE ENCOUNTER
Patient is overdue for follow up and labs, he has been scheduled for both and states he will not need his medication sent in prior.

## 2025-05-19 ENCOUNTER — HOSPITAL ENCOUNTER (OUTPATIENT)
Facility: HOSPITAL | Age: 75
Setting detail: SPECIMEN
Discharge: HOME | End: 2025-05-22
Payer: MEDICARE

## 2025-05-19 ENCOUNTER — CLINICAL SUPPORT (OUTPATIENT)
Facility: CLINIC | Age: 75
End: 2025-05-19
Payer: MEDICARE

## 2025-05-19 DIAGNOSIS — M1A.0790 CHRONIC IDIOPATHIC GOUT INVOLVING TOE WITHOUT TOPHUS, UNSPECIFIED LATERALITY: ICD-10-CM

## 2025-05-19 DIAGNOSIS — M1A.0790 CHRONIC IDIOPATHIC GOUT INVOLVING TOE WITHOUT TOPHUS, UNSPECIFIED LATERALITY: Primary | ICD-10-CM

## 2025-05-19 LAB
ALBUMIN SERPL-MCNC: 3.8 G/DL (ref 3.4–5)
ALBUMIN/GLOB SERPL: 1 (ref 0.8–1.7)
ALP SERPL-CCNC: 86 U/L (ref 45–117)
ALT SERPL-CCNC: 21 U/L (ref 10–50)
ANION GAP SERPL CALC-SCNC: 14 MMOL/L (ref 3–18)
AST SERPL-CCNC: 38 U/L (ref 10–38)
BASOPHILS # BLD: 0 K/UL (ref 0–0.1)
BASOPHILS NFR BLD: 0 % (ref 0–2)
BILIRUB SERPL-MCNC: 0.6 MG/DL (ref 0.2–1)
BUN SERPL-MCNC: 16 MG/DL (ref 6–23)
BUN/CREAT SERPL: 15 (ref 12–20)
CALCIUM SERPL-MCNC: 9.7 MG/DL (ref 8.5–10.1)
CHLORIDE SERPL-SCNC: 103 MMOL/L (ref 98–107)
CO2 SERPL-SCNC: 24 MMOL/L (ref 21–32)
CREAT SERPL-MCNC: 1.03 MG/DL (ref 0.6–1.3)
DIFFERENTIAL METHOD BLD: ABNORMAL
EOSINOPHIL # BLD: 0.05 K/UL (ref 0–0.4)
EOSINOPHIL NFR BLD: 2 % (ref 0–5)
ERYTHROCYTE [DISTWIDTH] IN BLOOD BY AUTOMATED COUNT: 13.1 % (ref 11.6–14.5)
GLOBULIN SER CALC-MCNC: 3.8 G/DL (ref 2–4)
GLUCOSE SERPL-MCNC: 85 MG/DL (ref 74–108)
HCT VFR BLD AUTO: 38.8 % (ref 36–48)
HGB BLD-MCNC: 12.4 G/DL (ref 13–16)
IMM GRANULOCYTES # BLD AUTO: 0 K/UL (ref 0–0.04)
IMM GRANULOCYTES NFR BLD AUTO: 0 % (ref 0–0.5)
LYMPHOCYTES # BLD: 1.7 K/UL (ref 0.9–3.6)
LYMPHOCYTES NFR BLD: 63 % (ref 21–52)
MCH RBC QN AUTO: 31.4 PG (ref 24–34)
MCHC RBC AUTO-ENTMCNC: 32 G/DL (ref 31–37)
MCV RBC AUTO: 98.2 FL (ref 78–100)
MONOCYTES # BLD: 0.11 K/UL (ref 0.05–1.2)
MONOCYTES NFR BLD: 4 % (ref 3–10)
NEUTS SEG # BLD: 0.73 K/UL (ref 1.8–8)
NEUTS SEG NFR BLD: 27 % (ref 40–73)
NRBC # BLD: 0 K/UL (ref 0–0.01)
NRBC BLD-RTO: 0 PER 100 WBC
OTHER CELLS NFR BLD MANUAL: 4
PLATELET # BLD AUTO: 199 K/UL (ref 135–420)
PLATELET COMMENT: ABNORMAL
PMV BLD AUTO: 10.9 FL (ref 9.2–11.8)
POTASSIUM SERPL-SCNC: 4.7 MMOL/L (ref 3.5–5.5)
PROT SERPL-MCNC: 7.6 G/DL (ref 6.4–8.2)
RBC # BLD AUTO: 3.95 M/UL (ref 4.35–5.65)
RBC MORPH BLD: ABNORMAL
SODIUM SERPL-SCNC: 141 MMOL/L (ref 136–145)
URATE SERPL-MCNC: 2.8 MG/DL (ref 2.6–7.2)
WBC # BLD AUTO: 2.7 K/UL (ref 4.6–13.2)

## 2025-05-19 PROCEDURE — 36415 COLL VENOUS BLD VENIPUNCTURE: CPT | Performed by: FAMILY MEDICINE

## 2025-05-19 PROCEDURE — 80053 COMPREHEN METABOLIC PANEL: CPT

## 2025-05-19 PROCEDURE — 85025 COMPLETE CBC W/AUTO DIFF WBC: CPT

## 2025-05-19 PROCEDURE — 84550 ASSAY OF BLOOD/URIC ACID: CPT

## 2025-05-19 NOTE — PROGRESS NOTES
Patient here for his [facility] lab draw name and  verified venipuncture performed on patients left arm was successful patient tolerated well.

## 2025-05-29 ENCOUNTER — TELEPHONE (OUTPATIENT)
Facility: CLINIC | Age: 75
End: 2025-05-29

## 2025-05-29 SDOH — ECONOMIC STABILITY: TRANSPORTATION INSECURITY
IN THE PAST 12 MONTHS, HAS THE LACK OF TRANSPORTATION KEPT YOU FROM MEDICAL APPOINTMENTS OR FROM GETTING MEDICATIONS?: NO

## 2025-05-29 SDOH — ECONOMIC STABILITY: FOOD INSECURITY: WITHIN THE PAST 12 MONTHS, YOU WORRIED THAT YOUR FOOD WOULD RUN OUT BEFORE YOU GOT MONEY TO BUY MORE.: NEVER TRUE

## 2025-05-29 SDOH — ECONOMIC STABILITY: FOOD INSECURITY: WITHIN THE PAST 12 MONTHS, THE FOOD YOU BOUGHT JUST DIDN'T LAST AND YOU DIDN'T HAVE MONEY TO GET MORE.: NEVER TRUE

## 2025-05-29 SDOH — ECONOMIC STABILITY: INCOME INSECURITY: IN THE LAST 12 MONTHS, WAS THERE A TIME WHEN YOU WERE NOT ABLE TO PAY THE MORTGAGE OR RENT ON TIME?: NO

## 2025-05-30 ENCOUNTER — OFFICE VISIT (OUTPATIENT)
Facility: CLINIC | Age: 75
End: 2025-05-30

## 2025-05-30 VITALS
TEMPERATURE: 97.8 F | HEIGHT: 70 IN | SYSTOLIC BLOOD PRESSURE: 124 MMHG | DIASTOLIC BLOOD PRESSURE: 78 MMHG | BODY MASS INDEX: 21.73 KG/M2 | HEART RATE: 66 BPM | WEIGHT: 151.8 LBS | OXYGEN SATURATION: 98 %

## 2025-05-30 DIAGNOSIS — C49.A0 MALIGNANT GASTROINTESTINAL STROMAL TUMOR, UNSPECIFIED SITE (HCC): ICD-10-CM

## 2025-05-30 DIAGNOSIS — J45.50 SEVERE PERSISTENT ASTHMA WITHOUT COMPLICATION (HCC): ICD-10-CM

## 2025-05-30 DIAGNOSIS — C16.8 MALIGNANT NEOPLASM OF POSTERIOR WALL OF STOMACH (HCC): ICD-10-CM

## 2025-05-30 DIAGNOSIS — M1A.0790 CHRONIC IDIOPATHIC GOUT INVOLVING TOE WITHOUT TOPHUS, UNSPECIFIED LATERALITY: Primary | ICD-10-CM

## 2025-05-30 DIAGNOSIS — R79.9 ABNORMAL BLOOD CELL COUNT: ICD-10-CM

## 2025-05-30 RX ORDER — ALLOPURINOL 300 MG/1
300 TABLET ORAL DAILY
Qty: 90 TABLET | Refills: 3 | Status: SHIPPED | OUTPATIENT
Start: 2025-05-30

## 2025-05-30 RX ORDER — FLUTICASONE PROPIONATE 50 MCG
SPRAY, SUSPENSION (ML) NASAL
COMMUNITY

## 2025-05-30 RX ORDER — INDOMETHACIN 50 MG/1
CAPSULE ORAL
Qty: 30 CAPSULE | Refills: 1 | Status: SHIPPED | OUTPATIENT
Start: 2025-05-30

## 2025-05-30 RX ORDER — AZELASTINE HYDROCHLORIDE 137 UG/1
SPRAY, METERED NASAL
COMMUNITY

## 2025-05-30 ASSESSMENT — PATIENT HEALTH QUESTIONNAIRE - PHQ9
2. FEELING DOWN, DEPRESSED OR HOPELESS: NOT AT ALL
SUM OF ALL RESPONSES TO PHQ QUESTIONS 1-9: 1
4. FEELING TIRED OR HAVING LITTLE ENERGY: NOT AT ALL
SUM OF ALL RESPONSES TO PHQ QUESTIONS 1-9: 1
10. IF YOU CHECKED OFF ANY PROBLEMS, HOW DIFFICULT HAVE THESE PROBLEMS MADE IT FOR YOU TO DO YOUR WORK, TAKE CARE OF THINGS AT HOME, OR GET ALONG WITH OTHER PEOPLE: NOT DIFFICULT AT ALL
8. MOVING OR SPEAKING SO SLOWLY THAT OTHER PEOPLE COULD HAVE NOTICED. OR THE OPPOSITE, BEING SO FIGETY OR RESTLESS THAT YOU HAVE BEEN MOVING AROUND A LOT MORE THAN USUAL: NOT AT ALL
SUM OF ALL RESPONSES TO PHQ QUESTIONS 1-9: 1
5. POOR APPETITE OR OVEREATING: NOT AT ALL
3. TROUBLE FALLING OR STAYING ASLEEP: SEVERAL DAYS
6. FEELING BAD ABOUT YOURSELF - OR THAT YOU ARE A FAILURE OR HAVE LET YOURSELF OR YOUR FAMILY DOWN: NOT AT ALL
9. THOUGHTS THAT YOU WOULD BE BETTER OFF DEAD, OR OF HURTING YOURSELF: NOT AT ALL
SUM OF ALL RESPONSES TO PHQ QUESTIONS 1-9: 1
7. TROUBLE CONCENTRATING ON THINGS, SUCH AS READING THE NEWSPAPER OR WATCHING TELEVISION: NOT AT ALL
1. LITTLE INTEREST OR PLEASURE IN DOING THINGS: NOT AT ALL

## 2025-05-30 NOTE — PROGRESS NOTES
Chief Complaint   Patient presents with    Gout     Patient here today to be seen for routine follow up and lab review.          Have you been to the ER, urgent care clinic since your last visit?  Hospitalized since your last visit?   NO    Have you seen or consulted any other health care providers outside our system since your last visit?   YES - When: approximately 2 months ago.  Where and Why: Surgical follow up.

## 2025-05-30 NOTE — ASSESSMENT & PLAN NOTE
3/10/25 gastric wedge (proximal sleeve), ileocecetomy. Seeing Dr. Martinez. No longer under hem/onc surveillance.  - Remains a cancer patient and will need to follow up every 6 months for monitoring, as the condition does not show up on scans.  - If necessary, exploratory surgery may be required to check for recurrence.

## 2025-05-30 NOTE — ASSESSMENT & PLAN NOTE
WBC count low at 2.9, Hb low at 12.8. normal plts. Unchanged over several years, when has been under hem/onc specialty care for prostate cancer. No intervention indicated

## 2025-05-30 NOTE — PROGRESS NOTES
Michael Espinoza (: 1950) is a 74 y.o. male, established patient, here for:    ASSESSMENT/PLAN:  Chronic idiopathic gout involving toe without tophus, unspecified laterality  -     allopurinol (ZYLOPRIM) 300 MG tablet; Take 1 tablet by mouth daily, Disp-90 tablet, R-3Normal  -     indomethacin (INDOCIN) 50 MG capsule; take 1 capsule by mouth three times a day if needed for FOR GOUT, Disp-30 capsule, R-1Normal  -     CBC with Auto Differential; Future  -     Uric Acid; Future  -     Comprehensive Metabolic Panel; Future  Malignant gastrointestinal stromal tumor, unspecified site (HCC)  Assessment & Plan:  3/10/25 gastric wedge (proximal sleeve), ileocecetomy. Seeing Dr. Martinez. No longer under medical hem/onc surveillance.  - Remains a cancer patient and will need to follow up every 6 months for monitoring, as the condition does not show up on scans.  - If necessary, exploratory surgery may be required to check for recurrence.  Severe persistent asthma without complication (HCC)  Assessment & Plan:  Being evaluated/managed by Dr. Moyer. No acute findings today meriting change in management plan.    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 normal plts. Unchanged over several years, when has been under hem/onc specialty care for prostate cancer. No intervention indicated             Follow-up and Dispositions    Return in about 6 months (around 2025) for gout follow up, MWV same day, (30), labs prior.          Subjective   SUBJECTIVE/OBJECTIVE:    Chief Complaint   Patient presents with    Gout     Patient here today to be seen for routine follow up and lab review.       History of Present Illness  The patient presents for evaluation of gout and malignant gastrointestinal stromal tumor.    He underwent a significant surgical procedure in 2025, which involved

## (undated) DEVICE — KENDALL SCD EXPRESS SLEEVES, KNEE LENGTH, MEDIUM: Brand: KENDALL SCD

## (undated) DEVICE — Device

## (undated) DEVICE — STAPLER 60 RELOAD WHITE: Brand: SUREFORM

## (undated) DEVICE — SUTURE ETHIBOND EXCEL SZ 2-0 L30IN NONABSORBABLE GRN L26MM SH X833H

## (undated) DEVICE — DECANTER FLD 9IN ST BG FOR ASEP TRNSF OF FLD

## (undated) DEVICE — NEEDLE SPNL 20GA L3.5IN YEL HUB S STL REG WALL FIT STYL

## (undated) DEVICE — APPLICATOR MEDICATED 26 CC SOLUTION HI LT ORNG CHLORAPREP

## (undated) DEVICE — KIT,ANTI FOG,W/SPONGE & FLUID,SOFT PACK: Brand: MEDLINE

## (undated) DEVICE — MASTISOL ADHESIVE LIQ 2/3ML

## (undated) DEVICE — (D)STRIP SKN CLSR 0.5X4IN WHT --

## (undated) DEVICE — CTS RELIEF KIT, CARPAL TUNNEL SYNDROME RELIEF KIT: Brand: CTS RELIEF KIT

## (undated) DEVICE — TROCARS: Brand: KII® OPTICAL ACCESS SYSTEM

## (undated) DEVICE — (D)PREP SKN CHLRAPRP APPL 26ML -- CONVERT TO ITEM 371833

## (undated) DEVICE — VESSEL SEALER EXTEND: Brand: ENDOWRIST

## (undated) DEVICE — TAPE,CLOTH/SILK,CURAD,3"X10YD,LF,40/CS: Brand: CURAD

## (undated) DEVICE — BANDAGE COMPR W4INXL5YD ELAS CLP CLSR

## (undated) DEVICE — STERILE POLYISOPRENE POWDER-FREE SURGICAL GLOVES: Brand: PROTEXIS

## (undated) DEVICE — STAPLER 60: Brand: SUREFORM

## (undated) DEVICE — DRAPE TOWEL: Brand: CONVERTORS

## (undated) DEVICE — KIT SUTURING DEVICE M-CLOSE

## (undated) DEVICE — BANDAGE,GAUZE,BULKEE II,4.5"X4.1YD,STRL: Brand: MEDLINE

## (undated) DEVICE — SEALANT TISS 10 ML FIBRIN VISTASEAL

## (undated) DEVICE — LIQUIBAND RAPID ADHESIVE 36/CS 0.8ML: Brand: MEDLINE

## (undated) DEVICE — 3M™ STERI-DRAPE™ INSTRUMENT POUCH 1018L: Brand: STERI-DRAPE™

## (undated) DEVICE — STAPLER 60 RELOAD BLACK: Brand: SUREFORM

## (undated) DEVICE — SYRINGE MED 30ML STD CLR PLAS LUERLOCK TIP N CTRL DISP

## (undated) DEVICE — REDUCER: Brand: ENDOWRIST

## (undated) DEVICE — SPONGE GZ W4XL4IN COT 12 PLY TYP VII WVN C FLD DSGN

## (undated) DEVICE — SKIN MARKER,REGULAR TIP WITH RULER AND LABELS: Brand: DEVON

## (undated) DEVICE — KIT OR TURNOVER

## (undated) DEVICE — AIRSEAL BIFURCATED SMOKE EVAC FILTERED TUBE SET: Brand: AIRSEAL

## (undated) DEVICE — STERILE POLYISOPRENE POWDER-FREE SURGICAL GLOVES WITH EMOLLIENT COATING: Brand: PROTEXIS

## (undated) DEVICE — BLADELESS OBTURATOR: Brand: WECK VISTA

## (undated) DEVICE — STAPLER 60 RELOAD BLUE: Brand: SUREFORM

## (undated) DEVICE — ELECTRODE PT RET AD L9FT HI MOIST COND ADH HYDRGEL CORDED

## (undated) DEVICE — VISIGI 3D®  CALIBRATION SYSTEM  SIZE 40FR STD W/ BULB: Brand: BOEHRINGER® VISIGI 3D™ SLEEVE GASTRECTOMY CALIBRATION SYSTEM, SIZE 40FR W/BULB

## (undated) DEVICE — DEVON™ KNEE AND BODY STRAP 60" X 3" (1.5 M X 7.6 CM): Brand: DEVON

## (undated) DEVICE — SYRINGE MED 10ML LUERLOCK TIP W/O SFTY DISP

## (undated) DEVICE — PACK PROCEDURE SURG EXTREMITY CUST

## (undated) DEVICE — COLUMN DRAPE

## (undated) DEVICE — INTENDED FOR TISSUE SEPARATION, AND OTHER PROCEDURES THAT REQUIRE A SHARP SURGICAL BLADE TO PUNCTURE OR CUT.: Brand: BARD-PARKER ® STAINLESS STEEL BLADES

## (undated) DEVICE — ARM DRAPE

## (undated) DEVICE — TISSUE RETRIEVAL SYSTEM: Brand: INZII RETRIEVAL SYSTEM

## (undated) DEVICE — SEAL

## (undated) DEVICE — SOLUTION IRRIG 1000ML 0.9% SOD CHL USP POUR PLAS BTL

## (undated) DEVICE — SUTURE MONOCRYL SZ 4-0 L27IN ABSRB UD L24MM PS-1 3/8 CIR PRIM Y935H

## (undated) DEVICE — GOWN,PREVENTION PLUS,XLN/XL,ST,24/CS: Brand: MEDLINE

## (undated) DEVICE — INSUFFLATION NEEDLE TO ESTABLISH PNEUMOPERITONEUM.: Brand: INSUFFLATION NEEDLE

## (undated) DEVICE — SOLUTION ANTIFOG VIS SYS CLEARIFY LAPSCP